# Patient Record
Sex: MALE | Race: WHITE | Employment: OTHER | URBAN - NONMETROPOLITAN AREA
[De-identification: names, ages, dates, MRNs, and addresses within clinical notes are randomized per-mention and may not be internally consistent; named-entity substitution may affect disease eponyms.]

---

## 2017-02-24 ENCOUNTER — APPOINTMENT (OUTPATIENT)
Dept: GENERAL RADIOLOGY | Age: 67
End: 2017-02-24
Payer: MEDICARE

## 2017-02-24 ENCOUNTER — HOSPITAL ENCOUNTER (EMERGENCY)
Age: 67
Discharge: HOME | End: 2017-02-24
Attending: EMERGENCY MEDICINE
Payer: MEDICARE

## 2017-02-24 VITALS
WEIGHT: 238 LBS | BODY MASS INDEX: 32.23 KG/M2 | HEIGHT: 72 IN | HEART RATE: 58 BPM | RESPIRATION RATE: 20 BRPM | SYSTOLIC BLOOD PRESSURE: 129 MMHG | TEMPERATURE: 97.9 F | DIASTOLIC BLOOD PRESSURE: 78 MMHG | OXYGEN SATURATION: 94 %

## 2017-02-24 DIAGNOSIS — R07.9 CHEST PAIN, UNSPECIFIED TYPE: Primary | ICD-10-CM

## 2017-02-24 LAB
ALBUMIN SERPL-MCNC: 4.5 G/DL (ref 3.5–5.2)
ALP BLD-CCNC: 61 U/L (ref 40–130)
ALT SERPL-CCNC: 35 U/L (ref 5–41)
ANION GAP SERPL CALCULATED.3IONS-SCNC: 14 MMOL/L (ref 7–19)
AST SERPL-CCNC: 24 U/L (ref 5–40)
BASOPHILS ABSOLUTE: 0.1 K/UL (ref 0–0.2)
BASOPHILS RELATIVE PERCENT: 0.9 % (ref 0–1)
BILIRUB SERPL-MCNC: 0.5 MG/DL (ref 0.2–1.2)
BUN BLDV-MCNC: 19 MG/DL (ref 8–23)
CALCIUM SERPL-MCNC: 10.3 MG/DL (ref 8.8–10.2)
CHLORIDE BLD-SCNC: 102 MMOL/L (ref 98–111)
CO2: 23 MMOL/L (ref 22–29)
CREAT SERPL-MCNC: 0.9 MG/DL (ref 0.5–1.2)
EOSINOPHILS ABSOLUTE: 0.2 K/UL (ref 0–0.6)
EOSINOPHILS RELATIVE PERCENT: 4 % (ref 0–5)
GFR NON-AFRICAN AMERICAN: >60
GLOBULIN: 3.2 G/DL
GLUCOSE BLD-MCNC: 93 MG/DL (ref 74–109)
HCT VFR BLD CALC: 43.5 % (ref 42–52)
HEMOGLOBIN: 15.3 G/DL (ref 14–18)
LYMPHOCYTES ABSOLUTE: 1.9 K/UL (ref 1.1–4.5)
LYMPHOCYTES RELATIVE PERCENT: 32 % (ref 20–40)
MCH RBC QN AUTO: 30.1 PG (ref 27–31)
MCHC RBC AUTO-ENTMCNC: 35.2 G/DL (ref 33–37)
MCV RBC AUTO: 85.6 FL (ref 80–94)
MONOCYTES ABSOLUTE: 0.6 K/UL (ref 0–0.9)
MONOCYTES RELATIVE PERCENT: 10 % (ref 0–10)
NEUTROPHILS ABSOLUTE: 3.1 K/UL (ref 1.5–7.5)
NEUTROPHILS RELATIVE PERCENT: 53.1 % (ref 50–65)
PDW BLD-RTO: 13.6 % (ref 11.5–14.5)
PERFORMED ON: NORMAL
PLATELET # BLD: 166 K/UL (ref 130–400)
PMV BLD AUTO: 12.9 FL (ref 7.4–10.4)
POC TROPONIN I: 0 NG/ML (ref 0–0.08)
POTASSIUM SERPL-SCNC: 4 MMOL/L (ref 3.5–5)
RBC # BLD: 5.08 M/UL (ref 4.7–6.1)
SODIUM BLD-SCNC: 139 MMOL/L (ref 136–145)
TOTAL PROTEIN: 7.7 G/DL (ref 6.6–8.7)
TROPONIN: <0.01 NG/ML (ref 0–0.03)
WBC # BLD: 5.8 K/UL (ref 4.8–10.8)

## 2017-02-24 PROCEDURE — 93350 STRESS TTE ONLY: CPT

## 2017-02-24 PROCEDURE — 84484 ASSAY OF TROPONIN QUANT: CPT

## 2017-02-24 PROCEDURE — 99282 EMERGENCY DEPT VISIT SF MDM: CPT | Performed by: EMERGENCY MEDICINE

## 2017-02-24 PROCEDURE — 71010 XR CHEST PORTABLE: CPT

## 2017-02-24 PROCEDURE — 80053 COMPREHEN METABOLIC PANEL: CPT

## 2017-02-24 PROCEDURE — 36415 COLL VENOUS BLD VENIPUNCTURE: CPT

## 2017-02-24 PROCEDURE — 93005 ELECTROCARDIOGRAM TRACING: CPT

## 2017-02-24 PROCEDURE — 85025 COMPLETE CBC W/AUTO DIFF WBC: CPT

## 2017-02-24 PROCEDURE — 99285 EMERGENCY DEPT VISIT HI MDM: CPT

## 2017-02-24 RX ORDER — SIMVASTATIN 40 MG
40 TABLET ORAL NIGHTLY
COMMUNITY
End: 2019-04-02

## 2017-02-24 ASSESSMENT — ENCOUNTER SYMPTOMS
ALLERGIC/IMMUNOLOGIC NEGATIVE: 1
ORTHOPNEA: 0
HEARTBURN: 0
COUGH: 0
SHORTNESS OF BREATH: 0
ABDOMINAL PAIN: 0
BACK PAIN: 0
VOMITING: 0
NAUSEA: 0
EYES NEGATIVE: 1
RESPIRATORY NEGATIVE: 1
TROUBLE SWALLOWING: 0
GASTROINTESTINAL NEGATIVE: 1

## 2017-02-24 ASSESSMENT — PAIN DESCRIPTION - LOCATION: LOCATION: CHEST

## 2017-02-24 ASSESSMENT — PAIN DESCRIPTION - DESCRIPTORS: DESCRIPTORS: SHARP

## 2017-02-24 ASSESSMENT — PAIN SCALES - GENERAL: PAINLEVEL_OUTOF10: 5

## 2017-02-28 LAB
EKG P AXIS: 55 DEGREES
EKG P-R INTERVAL: 170 MS
EKG Q-T INTERVAL: 404 MS
EKG QRS DURATION: 110 MS
EKG QTC CALCULATION (BAZETT): 412 MS
EKG T AXIS: 43 DEGREES

## 2018-09-05 LAB
BILIRUBIN URINE: NEGATIVE
BLOOD, URINE: NEGATIVE
CLARITY: CLEAR
COLOR: YELLOW
GLUCOSE URINE: NEGATIVE MG/DL
KETONES, URINE: NEGATIVE MG/DL
LEUKOCYTE ESTERASE, URINE: NEGATIVE
NITRITE, URINE: NEGATIVE
PH UA: 6
PROTEIN UA: NEGATIVE MG/DL
SPECIFIC GRAVITY UA: 1.02
UROBILINOGEN, URINE: 0.2 E.U./DL

## 2018-09-07 LAB — URINE CULTURE, ROUTINE: NORMAL

## 2018-12-11 ENCOUNTER — HOSPITAL ENCOUNTER (OUTPATIENT)
Dept: GENERAL RADIOLOGY | Age: 68
Discharge: HOME | End: 2018-12-11
Payer: MEDICARE

## 2018-12-11 DIAGNOSIS — M54.2 SPINE PAIN, CERVICAL: ICD-10-CM

## 2018-12-11 PROCEDURE — 72040 X-RAY EXAM NECK SPINE 2-3 VW: CPT

## 2018-12-26 ENCOUNTER — HOSPITAL ENCOUNTER (OUTPATIENT)
Dept: NON INVASIVE DIAGNOSTICS | Age: 68
Discharge: HOME | End: 2018-12-26
Payer: MEDICARE

## 2018-12-26 LAB
LV EF: 50 %
LVEF MODALITY: NORMAL

## 2018-12-26 PROCEDURE — 93226 XTRNL ECG REC<48 HR SCAN A/R: CPT

## 2018-12-26 PROCEDURE — 93225 XTRNL ECG REC<48 HRS REC: CPT

## 2018-12-26 PROCEDURE — 93306 TTE W/DOPPLER COMPLETE: CPT

## 2019-02-01 ENCOUNTER — HOSPITAL ENCOUNTER (OUTPATIENT)
Dept: NEUROLOGY | Age: 69
Discharge: HOME | End: 2019-02-01
Payer: MEDICARE

## 2019-02-01 PROCEDURE — 95911 NRV CNDJ TEST 9-10 STUDIES: CPT

## 2019-02-01 PROCEDURE — 95911 NRV CNDJ TEST 9-10 STUDIES: CPT | Performed by: PSYCHIATRY & NEUROLOGY

## 2019-02-01 PROCEDURE — 95886 MUSC TEST DONE W/N TEST COMP: CPT | Performed by: PSYCHIATRY & NEUROLOGY

## 2019-02-01 PROCEDURE — 95886 MUSC TEST DONE W/N TEST COMP: CPT

## 2019-03-01 ENCOUNTER — HOSPITAL ENCOUNTER (OUTPATIENT)
Dept: MRI IMAGING | Age: 69
Discharge: HOME | End: 2019-03-01
Payer: MEDICARE

## 2019-03-01 DIAGNOSIS — M51.35 DDD (DEGENERATIVE DISC DISEASE), THORACOLUMBAR: ICD-10-CM

## 2019-03-01 DIAGNOSIS — M54.2 NECK PAIN: ICD-10-CM

## 2019-03-01 PROCEDURE — 72141 MRI NECK SPINE W/O DYE: CPT

## 2019-04-02 ENCOUNTER — HOSPITAL ENCOUNTER (OUTPATIENT)
Dept: PREADMISSION TESTING | Age: 69
Discharge: HOME | End: 2019-04-06
Payer: MEDICARE

## 2019-04-02 ENCOUNTER — HOSPITAL ENCOUNTER (OUTPATIENT)
Dept: GENERAL RADIOLOGY | Age: 69
Discharge: HOME | End: 2019-04-02
Payer: MEDICARE

## 2019-04-02 VITALS — HEIGHT: 72 IN | BODY MASS INDEX: 32.64 KG/M2 | WEIGHT: 241 LBS

## 2019-04-02 LAB
ALBUMIN SERPL-MCNC: 4.3 G/DL (ref 3.5–5.2)
ALP BLD-CCNC: 50 U/L (ref 40–130)
ALT SERPL-CCNC: 52 U/L (ref 5–41)
ANION GAP SERPL CALCULATED.3IONS-SCNC: 14 MMOL/L (ref 7–19)
APTT: 31.9 SEC (ref 26–36.2)
AST SERPL-CCNC: 33 U/L (ref 5–40)
BACTERIA: NEGATIVE /HPF
BASOPHILS ABSOLUTE: 0.1 K/UL (ref 0–0.2)
BASOPHILS RELATIVE PERCENT: 1.3 % (ref 0–1)
BILIRUB SERPL-MCNC: 0.5 MG/DL (ref 0.2–1.2)
BILIRUBIN URINE: NEGATIVE
BLOOD, URINE: ABNORMAL
BUN BLDV-MCNC: 15 MG/DL (ref 8–23)
CALCIUM SERPL-MCNC: 9.9 MG/DL (ref 8.8–10.2)
CHLORIDE BLD-SCNC: 103 MMOL/L (ref 98–111)
CLARITY: CLEAR
CO2: 23 MMOL/L (ref 22–29)
COLOR: ABNORMAL
CREAT SERPL-MCNC: 0.7 MG/DL (ref 0.5–1.2)
EKG P AXIS: 55 DEGREES
EKG P-R INTERVAL: 168 MS
EKG Q-T INTERVAL: 370 MS
EKG QRS DURATION: 96 MS
EKG QTC CALCULATION (BAZETT): 396 MS
EKG T AXIS: 62 DEGREES
EOSINOPHILS ABSOLUTE: 0.3 K/UL (ref 0–0.6)
EOSINOPHILS RELATIVE PERCENT: 5.2 % (ref 0–5)
EPITHELIAL CELLS, UA: 1 /HPF (ref 0–5)
GFR NON-AFRICAN AMERICAN: >60
GLUCOSE BLD-MCNC: 116 MG/DL (ref 74–109)
GLUCOSE URINE: NEGATIVE MG/DL
HCT VFR BLD CALC: 46 % (ref 42–52)
HEMOGLOBIN: 15.8 G/DL (ref 14–18)
HYALINE CASTS: 1 /HPF (ref 0–8)
INR BLD: 1.08 (ref 0.88–1.18)
KETONES, URINE: NEGATIVE MG/DL
LEUKOCYTE ESTERASE, URINE: ABNORMAL
LYMPHOCYTES ABSOLUTE: 1.5 K/UL (ref 1.1–4.5)
LYMPHOCYTES RELATIVE PERCENT: 28 % (ref 20–40)
MCH RBC QN AUTO: 30.2 PG (ref 27–31)
MCHC RBC AUTO-ENTMCNC: 34.3 G/DL (ref 33–37)
MCV RBC AUTO: 88 FL (ref 80–94)
MONOCYTES ABSOLUTE: 0.7 K/UL (ref 0–0.9)
MONOCYTES RELATIVE PERCENT: 13.5 % (ref 0–10)
NEUTROPHILS ABSOLUTE: 2.8 K/UL (ref 1.5–7.5)
NEUTROPHILS RELATIVE PERCENT: 51.6 % (ref 50–65)
NITRITE, URINE: NEGATIVE
PDW BLD-RTO: 13.1 % (ref 11.5–14.5)
PH UA: 6.5 (ref 5–8)
PLATELET # BLD: 174 K/UL (ref 130–400)
PMV BLD AUTO: 13 FL (ref 9.4–12.4)
POTASSIUM SERPL-SCNC: 4 MMOL/L (ref 3.5–5)
PROTEIN UA: 30 MG/DL
PROTHROMBIN TIME: 13.4 SEC (ref 12–14.6)
RBC # BLD: 5.23 M/UL (ref 4.7–6.1)
RBC UA: 1 /HPF (ref 0–4)
SODIUM BLD-SCNC: 140 MMOL/L (ref 136–145)
SPECIFIC GRAVITY UA: 1.02 (ref 1–1.03)
TOTAL PROTEIN: 7.9 G/DL (ref 6.6–8.7)
URINE REFLEX TO CULTURE: YES
UROBILINOGEN, URINE: 0.2 E.U./DL
WBC # BLD: 5.4 K/UL (ref 4.8–10.8)
WBC UA: 2 /HPF (ref 0–5)

## 2019-04-02 PROCEDURE — 87086 URINE CULTURE/COLONY COUNT: CPT

## 2019-04-02 PROCEDURE — 85025 COMPLETE CBC W/AUTO DIFF WBC: CPT

## 2019-04-02 PROCEDURE — 93005 ELECTROCARDIOGRAM TRACING: CPT

## 2019-04-02 PROCEDURE — 80053 COMPREHEN METABOLIC PANEL: CPT

## 2019-04-02 PROCEDURE — 85730 THROMBOPLASTIN TIME PARTIAL: CPT

## 2019-04-02 PROCEDURE — 81001 URINALYSIS AUTO W/SCOPE: CPT

## 2019-04-02 PROCEDURE — 85610 PROTHROMBIN TIME: CPT

## 2019-04-02 PROCEDURE — 71046 X-RAY EXAM CHEST 2 VIEWS: CPT

## 2019-04-02 RX ORDER — ATORVASTATIN CALCIUM 40 MG/1
40 TABLET, FILM COATED ORAL DAILY
Status: ON HOLD | COMMUNITY
End: 2020-06-02 | Stop reason: HOSPADM

## 2019-04-02 RX ORDER — LOSARTAN POTASSIUM AND HYDROCHLOROTHIAZIDE 25; 100 MG/1; MG/1
1 TABLET ORAL DAILY
COMMUNITY

## 2019-04-02 RX ORDER — MELOXICAM 15 MG/1
15 TABLET ORAL DAILY
Status: ON HOLD | COMMUNITY
End: 2019-04-12 | Stop reason: HOSPADM

## 2019-04-04 LAB
ORGANISM: ABNORMAL
URINE CULTURE, ROUTINE: ABNORMAL
URINE CULTURE, ROUTINE: ABNORMAL

## 2019-04-11 ENCOUNTER — HOSPITAL ENCOUNTER (INPATIENT)
Age: 69
LOS: 1 days | Discharge: HOME | DRG: 472 | End: 2019-04-12
Payer: MEDICARE

## 2019-04-11 ENCOUNTER — ANESTHESIA EVENT (OUTPATIENT)
Dept: OPERATING ROOM | Age: 69
DRG: 472 | End: 2019-04-11
Payer: MEDICARE

## 2019-04-11 ENCOUNTER — ANESTHESIA (OUTPATIENT)
Dept: OPERATING ROOM | Age: 69
DRG: 472 | End: 2019-04-11
Payer: MEDICARE

## 2019-04-11 ENCOUNTER — APPOINTMENT (OUTPATIENT)
Dept: GENERAL RADIOLOGY | Age: 69
DRG: 472 | End: 2019-04-11
Payer: MEDICARE

## 2019-04-11 VITALS
TEMPERATURE: 96.7 F | SYSTOLIC BLOOD PRESSURE: 139 MMHG | DIASTOLIC BLOOD PRESSURE: 67 MMHG | OXYGEN SATURATION: 90 % | RESPIRATION RATE: 1 BRPM

## 2019-04-11 DIAGNOSIS — G95.9 CERVICAL MYELOPATHY (HCC): Primary | ICD-10-CM

## 2019-04-11 PROBLEM — M50.321 DEGENERATION OF INTERVERTEBRAL DISC AT C4-C5 LEVEL: Status: ACTIVE | Noted: 2019-04-11

## 2019-04-11 LAB
ABO/RH: NORMAL
ANTIBODY SCREEN: NORMAL
GLUCOSE BLD-MCNC: 155 MG/DL (ref 70–99)
PERFORMED ON: ABNORMAL

## 2019-04-11 PROCEDURE — 72040 X-RAY EXAM NECK SPINE 2-3 VW: CPT

## 2019-04-11 PROCEDURE — 82948 REAGENT STRIP/BLOOD GLUCOSE: CPT

## 2019-04-11 PROCEDURE — 3600000005 HC SURGERY LEVEL 5 BASE

## 2019-04-11 PROCEDURE — 6370000000 HC RX 637 (ALT 250 FOR IP)

## 2019-04-11 PROCEDURE — 2500000003 HC RX 250 WO HCPCS: Performed by: ANESTHESIOLOGY

## 2019-04-11 PROCEDURE — 6360000002 HC RX W HCPCS

## 2019-04-11 PROCEDURE — 6360000002 HC RX W HCPCS: Performed by: ANESTHESIOLOGY

## 2019-04-11 PROCEDURE — 2709999900 HC NON-CHARGEABLE SUPPLY

## 2019-04-11 PROCEDURE — 3600000015 HC SURGERY LEVEL 5 ADDTL 15MIN

## 2019-04-11 PROCEDURE — 36415 COLL VENOUS BLD VENIPUNCTURE: CPT

## 2019-04-11 PROCEDURE — 2780000010 HC IMPLANT OTHER

## 2019-04-11 PROCEDURE — 0RB30ZZ EXCISION OF CERVICAL VERTEBRAL DISC, OPEN APPROACH: ICD-10-PCS

## 2019-04-11 PROCEDURE — 6360000002 HC RX W HCPCS: Performed by: NURSE ANESTHETIST, CERTIFIED REGISTERED

## 2019-04-11 PROCEDURE — 94664 DEMO&/EVAL PT USE INHALER: CPT

## 2019-04-11 PROCEDURE — 3700000001 HC ADD 15 MINUTES (ANESTHESIA)

## 2019-04-11 PROCEDURE — 94762 N-INVAS EAR/PLS OXIMTRY CONT: CPT

## 2019-04-11 PROCEDURE — C1713 ANCHOR/SCREW BN/BN,TIS/BN: HCPCS

## 2019-04-11 PROCEDURE — C1762 CONN TISS, HUMAN(INC FASCIA): HCPCS

## 2019-04-11 PROCEDURE — 86900 BLOOD TYPING SEROLOGIC ABO: CPT

## 2019-04-11 PROCEDURE — 86850 RBC ANTIBODY SCREEN: CPT

## 2019-04-11 PROCEDURE — 2700000000 HC OXYGEN THERAPY PER DAY

## 2019-04-11 PROCEDURE — 7100000000 HC PACU RECOVERY - FIRST 15 MIN

## 2019-04-11 PROCEDURE — 4A11X4G MONITORING OF PERIPHERAL NERVOUS ELECTRICAL ACTIVITY, INTRAOPERATIVE, EXTERNAL APPROACH: ICD-10-PCS

## 2019-04-11 PROCEDURE — 0RG20K0 FUSION OF 2 OR MORE CERVICAL VERTEBRAL JOINTS WITH NONAUTOLOGOUS TISSUE SUBSTITUTE, ANTERIOR APPROACH, ANTERIOR COLUMN, OPEN APPROACH: ICD-10-PCS

## 2019-04-11 PROCEDURE — 3209999900 FLUORO FOR SURGICAL PROCEDURES

## 2019-04-11 PROCEDURE — 2580000003 HC RX 258

## 2019-04-11 PROCEDURE — 7100000001 HC PACU RECOVERY - ADDTL 15 MIN

## 2019-04-11 PROCEDURE — 3700000000 HC ANESTHESIA ATTENDED CARE

## 2019-04-11 PROCEDURE — 86901 BLOOD TYPING SEROLOGIC RH(D): CPT

## 2019-04-11 PROCEDURE — 2500000003 HC RX 250 WO HCPCS: Performed by: NURSE ANESTHETIST, CERTIFIED REGISTERED

## 2019-04-11 PROCEDURE — L0120 CERV FLEX N/ADJ FOAM PRE OTS: HCPCS

## 2019-04-11 PROCEDURE — 1210000000 HC MED SURG R&B

## 2019-04-11 PROCEDURE — 2500000003 HC RX 250 WO HCPCS

## 2019-04-11 PROCEDURE — 2720000010 HC SURG SUPPLY STERILE

## 2019-04-11 DEVICE — 4.0MM VARIABLE ANGLE, SELF-DRILLING HEXALOBE SCREW, 16MM
Type: IMPLANTABLE DEVICE | Site: SPINE CERVICAL | Status: FUNCTIONAL
Brand: TRESTLE LUXE

## 2019-04-11 DEVICE — GRAFT BNE SUB 5CC VIABLE MTRX VIBNE: Type: IMPLANTABLE DEVICE | Site: SPINE CERVICAL | Status: FUNCTIONAL

## 2019-04-11 DEVICE — CAGE SPNL W17XH8MM D14.5MM 6DEG ANT CERV INTBDY FUS LORD W/: Type: IMPLANTABLE DEVICE | Site: SPINE CERVICAL | Status: FUNCTIONAL

## 2019-04-11 DEVICE — ANTERIOR CERVICAL PLATE ASSEMBLY, 2-LEVEL, 032MM
Type: IMPLANTABLE DEVICE | Site: SPINE CERVICAL | Status: FUNCTIONAL
Brand: TRESTLE LUXE

## 2019-04-11 DEVICE — AGENT HEMSTAT 8ML FLX TIP MTRX + DISP SURGIFLO: Type: IMPLANTABLE DEVICE | Site: SPINE CERVICAL | Status: FUNCTIONAL

## 2019-04-11 DEVICE — CAGE SPNL W17XH7MM D14.5MM 6DEG ANT CERV INTBDY FUS LORD W/: Type: IMPLANTABLE DEVICE | Site: SPINE CERVICAL | Status: FUNCTIONAL

## 2019-04-11 RX ORDER — SODIUM CHLORIDE 9 MG/ML
INJECTION, SOLUTION INTRAVENOUS CONTINUOUS
Status: DISCONTINUED | OUTPATIENT
Start: 2019-04-11 | End: 2019-04-12 | Stop reason: HOSPADM

## 2019-04-11 RX ORDER — LIDOCAINE HYDROCHLORIDE 10 MG/ML
INJECTION, SOLUTION INFILTRATION; PERINEURAL PRN
Status: DISCONTINUED | OUTPATIENT
Start: 2019-04-11 | End: 2019-04-11 | Stop reason: SDUPTHER

## 2019-04-11 RX ORDER — KETAMINE HYDROCHLORIDE 100 MG/ML
INJECTION, SOLUTION INTRAMUSCULAR; INTRAVENOUS PRN
Status: DISCONTINUED | OUTPATIENT
Start: 2019-04-11 | End: 2019-04-11 | Stop reason: SDUPTHER

## 2019-04-11 RX ORDER — LABETALOL HYDROCHLORIDE 5 MG/ML
5 INJECTION, SOLUTION INTRAVENOUS EVERY 10 MIN PRN
Status: DISCONTINUED | OUTPATIENT
Start: 2019-04-11 | End: 2019-04-11 | Stop reason: HOSPADM

## 2019-04-11 RX ORDER — MORPHINE SULFATE 4 MG/ML
4 INJECTION, SOLUTION INTRAMUSCULAR; INTRAVENOUS
Status: DISCONTINUED | OUTPATIENT
Start: 2019-04-11 | End: 2019-04-11 | Stop reason: HOSPADM

## 2019-04-11 RX ORDER — LOSARTAN POTASSIUM 100 MG/1
100 TABLET ORAL DAILY
Status: DISCONTINUED | OUTPATIENT
Start: 2019-04-11 | End: 2019-04-12 | Stop reason: HOSPADM

## 2019-04-11 RX ORDER — FENTANYL CITRATE 50 UG/ML
50 INJECTION, SOLUTION INTRAMUSCULAR; INTRAVENOUS
Status: DISCONTINUED | OUTPATIENT
Start: 2019-04-11 | End: 2019-04-11 | Stop reason: HOSPADM

## 2019-04-11 RX ORDER — CEFAZOLIN SODIUM 1 G/50ML
1 INJECTION, SOLUTION INTRAVENOUS EVERY 8 HOURS
Status: COMPLETED | OUTPATIENT
Start: 2019-04-11 | End: 2019-04-12

## 2019-04-11 RX ORDER — OXYCODONE HYDROCHLORIDE AND ACETAMINOPHEN 5; 325 MG/1; MG/1
2 TABLET ORAL EVERY 4 HOURS PRN
Status: DISCONTINUED | OUTPATIENT
Start: 2019-04-11 | End: 2019-04-12 | Stop reason: HOSPADM

## 2019-04-11 RX ORDER — PROPOFOL 10 MG/ML
INJECTION, EMULSION INTRAVENOUS CONTINUOUS PRN
Status: DISCONTINUED | OUTPATIENT
Start: 2019-04-11 | End: 2019-04-11 | Stop reason: SDUPTHER

## 2019-04-11 RX ORDER — OXYCODONE HYDROCHLORIDE AND ACETAMINOPHEN 5; 325 MG/1; MG/1
1 TABLET ORAL EVERY 4 HOURS PRN
Status: DISCONTINUED | OUTPATIENT
Start: 2019-04-11 | End: 2019-04-12 | Stop reason: HOSPADM

## 2019-04-11 RX ORDER — DIPHENHYDRAMINE HYDROCHLORIDE 50 MG/ML
12.5 INJECTION INTRAMUSCULAR; INTRAVENOUS
Status: DISCONTINUED | OUTPATIENT
Start: 2019-04-11 | End: 2019-04-11 | Stop reason: HOSPADM

## 2019-04-11 RX ORDER — HYDROCHLOROTHIAZIDE 25 MG/1
25 TABLET ORAL DAILY
Status: DISCONTINUED | OUTPATIENT
Start: 2019-04-11 | End: 2019-04-12 | Stop reason: HOSPADM

## 2019-04-11 RX ORDER — PROMETHAZINE HYDROCHLORIDE 25 MG/ML
6.25 INJECTION, SOLUTION INTRAMUSCULAR; INTRAVENOUS
Status: DISCONTINUED | OUTPATIENT
Start: 2019-04-11 | End: 2019-04-11 | Stop reason: HOSPADM

## 2019-04-11 RX ORDER — LOSARTAN POTASSIUM AND HYDROCHLOROTHIAZIDE 25; 100 MG/1; MG/1
1 TABLET ORAL DAILY
Status: DISCONTINUED | OUTPATIENT
Start: 2019-04-11 | End: 2019-04-11 | Stop reason: CLARIF

## 2019-04-11 RX ORDER — PROPOFOL 10 MG/ML
INJECTION, EMULSION INTRAVENOUS PRN
Status: DISCONTINUED | OUTPATIENT
Start: 2019-04-11 | End: 2019-04-11 | Stop reason: SDUPTHER

## 2019-04-11 RX ORDER — DOCUSATE SODIUM 100 MG/1
100 CAPSULE, LIQUID FILLED ORAL 2 TIMES DAILY
Status: DISCONTINUED | OUTPATIENT
Start: 2019-04-11 | End: 2019-04-12 | Stop reason: HOSPADM

## 2019-04-11 RX ORDER — LIDOCAINE HYDROCHLORIDE 10 MG/ML
1 INJECTION, SOLUTION EPIDURAL; INFILTRATION; INTRACAUDAL; PERINEURAL
Status: DISCONTINUED | OUTPATIENT
Start: 2019-04-11 | End: 2019-04-11 | Stop reason: HOSPADM

## 2019-04-11 RX ORDER — SODIUM CHLORIDE 0.9 % (FLUSH) 0.9 %
10 SYRINGE (ML) INJECTION PRN
Status: DISCONTINUED | OUTPATIENT
Start: 2019-04-11 | End: 2019-04-12 | Stop reason: HOSPADM

## 2019-04-11 RX ORDER — SUCCINYLCHOLINE CHLORIDE 20 MG/ML
INJECTION INTRAMUSCULAR; INTRAVENOUS PRN
Status: DISCONTINUED | OUTPATIENT
Start: 2019-04-11 | End: 2019-04-11 | Stop reason: SDUPTHER

## 2019-04-11 RX ORDER — SODIUM CHLORIDE 0.9 % (FLUSH) 0.9 %
10 SYRINGE (ML) INJECTION PRN
Status: DISCONTINUED | OUTPATIENT
Start: 2019-04-11 | End: 2019-04-11 | Stop reason: HOSPADM

## 2019-04-11 RX ORDER — ONDANSETRON 2 MG/ML
4 INJECTION INTRAMUSCULAR; INTRAVENOUS EVERY 6 HOURS PRN
Status: DISCONTINUED | OUTPATIENT
Start: 2019-04-11 | End: 2019-04-12 | Stop reason: HOSPADM

## 2019-04-11 RX ORDER — SUFENTANIL CITRATE 50 UG/ML
INJECTION EPIDURAL; INTRAVENOUS PRN
Status: DISCONTINUED | OUTPATIENT
Start: 2019-04-11 | End: 2019-04-11 | Stop reason: SDUPTHER

## 2019-04-11 RX ORDER — MORPHINE SULFATE 2 MG/ML
2 INJECTION, SOLUTION INTRAMUSCULAR; INTRAVENOUS EVERY 5 MIN PRN
Status: DISCONTINUED | OUTPATIENT
Start: 2019-04-11 | End: 2019-04-11 | Stop reason: HOSPADM

## 2019-04-11 RX ORDER — SODIUM CHLORIDE 0.9 % (FLUSH) 0.9 %
10 SYRINGE (ML) INJECTION EVERY 12 HOURS SCHEDULED
Status: DISCONTINUED | OUTPATIENT
Start: 2019-04-11 | End: 2019-04-11 | Stop reason: HOSPADM

## 2019-04-11 RX ORDER — MIDAZOLAM HYDROCHLORIDE 1 MG/ML
2 INJECTION INTRAMUSCULAR; INTRAVENOUS
Status: DISCONTINUED | OUTPATIENT
Start: 2019-04-11 | End: 2019-04-11 | Stop reason: HOSPADM

## 2019-04-11 RX ORDER — DEXAMETHASONE SODIUM PHOSPHATE 10 MG/ML
INJECTION INTRAMUSCULAR; INTRAVENOUS PRN
Status: DISCONTINUED | OUTPATIENT
Start: 2019-04-11 | End: 2019-04-11 | Stop reason: SDUPTHER

## 2019-04-11 RX ORDER — ONDANSETRON 2 MG/ML
INJECTION INTRAMUSCULAR; INTRAVENOUS PRN
Status: DISCONTINUED | OUTPATIENT
Start: 2019-04-11 | End: 2019-04-11 | Stop reason: SDUPTHER

## 2019-04-11 RX ORDER — ATORVASTATIN CALCIUM 40 MG/1
40 TABLET, FILM COATED ORAL NIGHTLY
Status: DISCONTINUED | OUTPATIENT
Start: 2019-04-11 | End: 2019-04-12 | Stop reason: HOSPADM

## 2019-04-11 RX ORDER — SCOLOPAMINE TRANSDERMAL SYSTEM 1 MG/1
1 PATCH, EXTENDED RELEASE TRANSDERMAL ONCE
Status: DISCONTINUED | OUTPATIENT
Start: 2019-04-11 | End: 2019-04-11 | Stop reason: HOSPADM

## 2019-04-11 RX ORDER — SODIUM CHLORIDE 0.9 % (FLUSH) 0.9 %
10 SYRINGE (ML) INJECTION EVERY 12 HOURS SCHEDULED
Status: DISCONTINUED | OUTPATIENT
Start: 2019-04-11 | End: 2019-04-12 | Stop reason: HOSPADM

## 2019-04-11 RX ORDER — METOCLOPRAMIDE HYDROCHLORIDE 5 MG/ML
10 INJECTION INTRAMUSCULAR; INTRAVENOUS
Status: DISCONTINUED | OUTPATIENT
Start: 2019-04-11 | End: 2019-04-11 | Stop reason: HOSPADM

## 2019-04-11 RX ORDER — MEPERIDINE HYDROCHLORIDE 50 MG/ML
12.5 INJECTION INTRAMUSCULAR; INTRAVENOUS; SUBCUTANEOUS EVERY 5 MIN PRN
Status: DISCONTINUED | OUTPATIENT
Start: 2019-04-11 | End: 2019-04-11 | Stop reason: HOSPADM

## 2019-04-11 RX ORDER — MORPHINE SULFATE 2 MG/ML
4 INJECTION, SOLUTION INTRAMUSCULAR; INTRAVENOUS EVERY 5 MIN PRN
Status: DISCONTINUED | OUTPATIENT
Start: 2019-04-11 | End: 2019-04-11 | Stop reason: HOSPADM

## 2019-04-11 RX ORDER — SODIUM CHLORIDE, SODIUM LACTATE, POTASSIUM CHLORIDE, CALCIUM CHLORIDE 600; 310; 30; 20 MG/100ML; MG/100ML; MG/100ML; MG/100ML
INJECTION, SOLUTION INTRAVENOUS CONTINUOUS
Status: DISCONTINUED | OUTPATIENT
Start: 2019-04-11 | End: 2019-04-11

## 2019-04-11 RX ORDER — HYDRALAZINE HYDROCHLORIDE 20 MG/ML
5 INJECTION INTRAMUSCULAR; INTRAVENOUS EVERY 10 MIN PRN
Status: DISCONTINUED | OUTPATIENT
Start: 2019-04-11 | End: 2019-04-11 | Stop reason: HOSPADM

## 2019-04-11 RX ADMIN — ATORVASTATIN CALCIUM 40 MG: 40 TABLET, FILM COATED ORAL at 20:45

## 2019-04-11 RX ADMIN — ONDANSETRON HYDROCHLORIDE 4 MG: 2 INJECTION, SOLUTION INTRAMUSCULAR; INTRAVENOUS at 13:13

## 2019-04-11 RX ADMIN — MEPERIDINE HYDROCHLORIDE 12.5 MG: 50 INJECTION, SOLUTION INTRAMUSCULAR; INTRAVENOUS; SUBCUTANEOUS at 14:25

## 2019-04-11 RX ADMIN — SODIUM CHLORIDE, SODIUM LACTATE, POTASSIUM CHLORIDE, AND CALCIUM CHLORIDE: 600; 310; 30; 20 INJECTION, SOLUTION INTRAVENOUS at 11:34

## 2019-04-11 RX ADMIN — PROPOFOL 160 MCG/KG/MIN: 10 INJECTION, EMULSION INTRAVENOUS at 11:46

## 2019-04-11 RX ADMIN — PROPOFOL 120 MG: 10 INJECTION, EMULSION INTRAVENOUS at 11:45

## 2019-04-11 RX ADMIN — SODIUM CHLORIDE, SODIUM LACTATE, POTASSIUM CHLORIDE, AND CALCIUM CHLORIDE: 600; 310; 30; 20 INJECTION, SOLUTION INTRAVENOUS at 13:19

## 2019-04-11 RX ADMIN — Medication 50 MG: at 11:59

## 2019-04-11 RX ADMIN — SODIUM CHLORIDE: 9 INJECTION, SOLUTION INTRAVENOUS at 15:34

## 2019-04-11 RX ADMIN — Medication 10 ML: at 20:46

## 2019-04-11 RX ADMIN — Medication 2 G: at 11:53

## 2019-04-11 RX ADMIN — DOCUSATE SODIUM 100 MG: 100 CAPSULE, LIQUID FILLED ORAL at 20:45

## 2019-04-11 RX ADMIN — CEFAZOLIN SODIUM 1 G: 1 INJECTION, SOLUTION INTRAVENOUS at 20:43

## 2019-04-11 RX ADMIN — OXYCODONE HYDROCHLORIDE AND ACETAMINOPHEN 2 TABLET: 5; 325 TABLET ORAL at 16:28

## 2019-04-11 RX ADMIN — HYDROMORPHONE HYDROCHLORIDE 0.5 MG: 1 INJECTION, SOLUTION INTRAMUSCULAR; INTRAVENOUS; SUBCUTANEOUS at 13:32

## 2019-04-11 RX ADMIN — DEXAMETHASONE SODIUM PHOSPHATE 10 MG: 10 INJECTION INTRAMUSCULAR; INTRAVENOUS at 11:59

## 2019-04-11 RX ADMIN — HYDROMORPHONE HYDROCHLORIDE 0.5 MG: 1 INJECTION, SOLUTION INTRAMUSCULAR; INTRAVENOUS; SUBCUTANEOUS at 13:42

## 2019-04-11 RX ADMIN — SUFENTANIL CITRATE 50 MCG: 50 INJECTION, SOLUTION EPIDURAL; INTRAVENOUS at 11:45

## 2019-04-11 RX ADMIN — LIDOCAINE HYDROCHLORIDE 50 MG: 10 INJECTION, SOLUTION INFILTRATION; PERINEURAL at 11:45

## 2019-04-11 RX ADMIN — SUCCINYLCHOLINE CHLORIDE 120 MG: 20 INJECTION, SOLUTION INTRAMUSCULAR; INTRAVENOUS; PARENTERAL at 11:45

## 2019-04-11 RX ADMIN — OXYCODONE HYDROCHLORIDE AND ACETAMINOPHEN 2 TABLET: 5; 325 TABLET ORAL at 20:48

## 2019-04-11 RX ADMIN — LABETALOL 20 MG/4 ML (5 MG/ML) INTRAVENOUS SYRINGE 5 MG: at 14:06

## 2019-04-11 RX ADMIN — HYDROMORPHONE HYDROCHLORIDE 0.5 MG: 1 INJECTION, SOLUTION INTRAMUSCULAR; INTRAVENOUS; SUBCUTANEOUS at 15:33

## 2019-04-11 ASSESSMENT — PAIN SCALES - GENERAL
PAINLEVEL_OUTOF10: 0
PAINLEVEL_OUTOF10: 0
PAINLEVEL_OUTOF10: 9
PAINLEVEL_OUTOF10: 0
PAINLEVEL_OUTOF10: 3
PAINLEVEL_OUTOF10: 0
PAINLEVEL_OUTOF10: 7
PAINLEVEL_OUTOF10: 0
PAINLEVEL_OUTOF10: 0
PAINLEVEL_OUTOF10: 7
PAINLEVEL_OUTOF10: 0
PAINLEVEL_OUTOF10: 0

## 2019-04-11 ASSESSMENT — ENCOUNTER SYMPTOMS: SHORTNESS OF BREATH: 0

## 2019-04-11 ASSESSMENT — LIFESTYLE VARIABLES: SMOKING_STATUS: 0

## 2019-04-11 NOTE — BRIEF OP NOTE
Brief Postoperative Note  ______________________________________________________________    Patient: Mike Diamond  YOB: 1950  MRN: [id number]  Date of Procedure: 4/11/2019    Pre-Op Diagnosis: g95.9    Post-Op Diagnosis: Same       Procedure(s):  C3-4 C4-5 ACDF    Anesthesia: General    Surgeon(s):  Nano Dawkins MD    Assistant: Rylie Thrasher    Estimated Blood Loss (mL): less than 50     Complications: None    Specimens:   * No specimens in log *    Implants:  Implant Name Type Inv. Item Serial No.  Lot No. LRB No. Used   IMPL SEALANT SURGIFLO HEMOSTAT MATRIC Bone/Graft/Tissue/Human/Synth IMPL SEALANT SURGIFLO HEMOSTAT MATRIC  [place]: [employer] [id number] N/A 1   ALLOGRAFT BONE MATRIX VIBONE 5CC Spine ALLOGRAFT BONE MATRIX VIBONE 5CC  [employer] [id number] N/A 1   IMPL SPINE SYS FIX CERVICAL [id number] 6D Spine IMPL SPINE SYS FIX CERVICAL [id number] 6D  [employer] [id number] N/A 1   IMPL SPINE SYS FIX CERVICAL [id number] Spine IMPL SPINE SYS FIX CERVICAL [id number]  [employer] [id number] N/A 1   PLATE TRESTLE 2 LVL 78KN Spine PLATE TRESTLE 2 LVL 12CJ  [employer]  N/A 1   SCREW 4MM YASMIN ANGLE SELF DRILLING 16MM Spine SCREW 4MM YASMIN ANGLE SELF DRILLING 16MM  ALPHAT[employer] SPINE  N/A 6         Drains:   Closed/Suction Drain Anterior Neck Bulb (Active)   Site Description Unable to view 4/11/2019  1:58 PM   Dressing Status Dry;Clean; Intact 4/11/2019  1:58 PM   Drainage Appearance Bright red 4/11/2019  1:58 PM   Status Compressed 4/11/2019  1:58 PM       Findings: SEE OPERATIVE REPORT    Nano Dawkins MD  Date: 4/11/2019  Time: 1:59 PM

## 2019-04-11 NOTE — PROGRESS NOTES
Physical Therapy    Attempted Evaluation. Pt states too groggy and pain at moment to participate. Will f/u at later time.     Electronically signed by Hailee Freeman on 4/11/2019 at 4:09 PM

## 2019-04-11 NOTE — ANESTHESIA PRE PROCEDURE
Department of Anesthesiology  Preprocedure Note       Name:  Reyes Trujillo   Age:  76 y.o.  :  1950                                          MRN:  [id number]         Date:  2019      Surgeon: Umair Kaplan):  Magui Lin MD    Procedure: C3-4 C4-5 ACDF (N/A )    Medications prior to admission:   Prior to Admission medications    Medication Sig Start Date End Date Taking? Authorizing Provider   metFORMIN (GLUCOPHAGE) 500 MG tablet Take 500 mg by mouth daily (with breakfast)   Yes Historical Provider, MD   atorvastatin (LIPITOR) 40 MG tablet Take 40 mg by mouth daily   Yes Historical Provider, MD   losartan-hydrochlorothiazide (HYZAAR) 100-25 MG per tablet Take 1 tablet by mouth daily   Yes Historical Provider, MD   meloxicam (MOBIC) 15 MG tablet Take 15 mg by mouth daily    Historical Provider, MD       Current medications:    Current Facility-Administered Medications   Medication Dose Route Frequency Provider Last Rate Last Dose    ceFAZolin (ANCEF) 2 g in 0.9% sodium chloride 50 mL IVPB  2 g Intravenous Once Magui Lin MD        lactated ringers infusion   Intravenous Continuous Magui Lin MD           Allergies:  No Known Allergies    Problem List:  There is no problem list on file for this patient. Past Medical History:        Diagnosis Date    Diabetes mellitus ([facility] [address]     Hyperlipidemia     Hypertension        Past Surgical History:        Procedure Laterality Date    BACK SURGERY      CARDIAC SURGERY  1999    CABG X 4V    CHOLECYSTECTOMY      KNEE SURGERY Right     scope, age 35ish        Social History:    Social History     Tobacco Use    Smoking status: Former Smoker     Types: Cigarettes     Last attempt to quit: 1995     Years since quittin.0    Smokeless tobacco: Never Used   Substance Use Topics    Alcohol use:  No                                Counseling given: Not Answered      Vital Signs (Current):   Vitals:    19 0840   BP: 137/71   Pulse: 73   Resp: 18   Temp: 97.5 °F (36.4 °C)   TempSrc: Temporal   SpO2: 95%   Weight: 250 lb (113.4 kg)   Height: 6' (1.829 m)                                              BP Readings from Last 3 Encounters:   04/11/19 137/71   02/24/17 129/78       NPO Status: Time of last liquid consumption: 0000                        Time of last solid consumption: 0000                        Date of last liquid consumption: 04/10/19                        Date of last solid food consumption: 04/10/19    BMI:   Wt Readings from Last 3 Encounters:   04/11/19 250 lb (113.4 kg)   04/02/19 241 lb (109.3 kg)   02/24/17 238 lb (108 kg)     Body mass index is 33.91 kg/m².     CBC:   Lab Results   Component Value Date    WBC 5.4 04/02/2019    RBC 5.23 04/02/2019    HGB 15.8 04/02/2019    HCT 46.0 04/02/2019    MCV 88.0 04/02/2019    RDW 13.1 04/02/2019     04/02/2019       CMP:   Lab Results   Component Value Date     04/02/2019    K 4.0 04/02/2019     04/02/2019    CO2 23 04/02/2019    BUN 15 04/02/2019    CREATININE 0.7 04/02/2019    LABGLOM >60 04/02/2019    GLUCOSE 116 04/02/2019    PROT 7.9 04/02/2019    CALCIUM 9.9 04/02/2019    BILITOT 0.5 04/02/2019    ALKPHOS 50 04/02/2019    AST 33 04/02/2019    ALT 52 04/02/2019       POC Tests:   Recent Labs     04/11/19  0857   POCGLU 155*       Coags:   Lab Results   Component Value Date    PROTIME 13.4 04/02/2019    INR 1.08 04/02/2019    APTT 31.9 04/02/2019       HCG (If Applicable): No results found for: PREGTESTUR, PREGSERUM, HCG, HCGQUANT     ABGs: No results found for: PHART, PO2ART, GAZ1ZZN, CPL4AJA, BEART, H7OPUVSG     Type & Screen (If Applicable):  No results found for: LABABO, [address]    Anesthesia Evaluation  Patient summary reviewed and Nursing notes reviewed no history of anesthetic complications:   Airway: Mallampati: II  TM distance: >3 FB   Neck ROM: full  Mouth opening: > = 3 FB Dental: normal exam   (+) upper dentures and lower dentures      Pulmonary:Negative Pulmonary ROS and normal exam  breath sounds clear to auscultation      (-) shortness of breath and not a current smoker          Patient did not smoke on day of surgery. Cardiovascular:    (+) hypertension:, CAD:, CABG/stent (3v cabg 1999):, hyperlipidemia    (-)  angina and  CHF    NYHA Classification: I  ECG reviewed  Rhythm: regular  Rate: normal  Echocardiogram reviewed         Beta Blocker:  Not on Beta Blocker      ROS comment:    Conclusions      Summary   Normal left ventricular size with preserved LV function and an estimated   ejection fraction of approximately 50%.  E/A flow reversal diastolic filling pattern consistent with Grade I   diastolic dysfunction.   Mild concentric left ventricular hypertrophy.   No evidence of left ventricle pseudoaneurysm.        Neuro/Psych:   Negative Neuro/Psych ROS     (-) seizures, CVA and depression/anxiety            GI/Hepatic/Renal: Neg GI/Hepatic/Renal ROS  (+) liver disease:,      (-) hiatal hernia and GERD      ROS comment: Elevated lfts . Endo/Other: Negative Endo/Other ROS   (+) DiabetesType II DM, , .          Pt had PAT visit. Abdominal:   (+) obese,     Abdomen: soft. Vascular:                                      Anesthesia Plan      general     ASA 3     (Iv zofran within 30 min of closing )  Induction: intravenous. BIS  MIPS: Postoperative opioids intended and Prophylactic antiemetics administered. Anesthetic plan and risks discussed with patient. Use of blood products discussed with patient whom. Plan discussed with CRNA.     Attending anesthesiologist reviewed and agrees with Pre Eval content              Jenn Ocampo MD   4/11/2019

## 2019-04-11 NOTE — OP NOTE
Pre Op Diagnosis:   1. Cervical DDD,   2.  Cervical spondylosis with resultant spinal stenosis C3-C5  3. Cervical radiculopathy   4. Cervical myelopathy     Post Op Diagnosis: Same     Procedure:    1. Anterior Cervical Discectomy and Fusion C3 4 and C4 5  2. PEK Spacer placement ×2 using the RTI spacers  3. Anterior plate fixation C3 4 and C4 5 using the Alphatec Trestle plate  4. Use of Allograft bone (Vibone)  5. Use of the surgical microscope  6. Use of intraoperative flouroscopy  7. Use of SSEP, MEP and continuous EMG monitoring     Surgeon: Seema Borges     Assistant: Madyson Enriquez PA-C, [place] assisted throughout all key components of this case by retracting soft  tissues and helping to enable adequate visualization to safely complete all  aspects of this procedure. He was scrubbed throughout the entirety of the  procedure. Anaesthesia: GETA     EBL: 27FH     Complications:none     Implants: See brief op note              PROCEDURE IN DETAIL  The patient was seen in the preoperative holding area. Prior to the  procedure, the operative site was confirmed and marked, and then the patient  was taken to the operating room. Once in the operating room, the patient was  positioned in a supine position on a standard operating room table. All bony  prominences are padded. The arms are padded and tucked by the patient's  side. Traction was placed across the shoulder using 3 inch wide silk tape. The C-arm was used to localize a site for the incision. The skin overlying  the operative level was marked. The C-arm was taken out. The anterior  cervical spine was prepped and draped in a sterile fashion. A time-out was  called and all in the room agreed to proceed. A right sided Ga-Chavira approach was used to expose the operative levels(C3-C5).    Through a transverse incision, the dissection was carried down through the  subcutaneous skin and through the platysma, and then developing the usual fascial elements were decompressed & the endplates were prepared for fusion by decorticating them with a high speed chastity. Once this was done, the trials were used to determine the appropriate size spacer. An 8 mm spacer was then obtained and packed with allograft bone and tamped into the disc space. The caspar pins were then removed and the holes left from the caspar pins were filled with surgiflo. The appropriate size plate was then selected  from the Alphate trestle anterior cervical plating system. The plate was affixed  to the vertebral bodies with 2 screws being placed cephalad and 2 screws  being placed caudad to the interbodies. All 6 screws obtained good purchase  and locked into the plate without any complication. Once this was completed,  the self-retaining retractor which had been used throughout the  case was removed. The surrounding soft tissues were inspected for any  evidence of excessive bleeding or iatrogenic injury; none was noted. The  C-arm was brought in and final C-arm images were made showing satisfactory  positioning of the anterior plate and screws as well as interbody spacer(s) and  hardware. The wound was irrigated one last time and then closed. The platysma was  reapproximated with 3-0 Vicryl and the subcutaneous skin was reapproximated  with 3-0 Vicryl and the skin edges were reapproximated with a running  subcuticular Monocryl. Mastisol, Steri-Strips and sterile dressings were  applied. The patient was placed in a rigid collar, awakened from general  endotracheal anesthesia, extubated and taken to the recovery room in  satisfactory condition. There were no complications. All counts were  correct at the end of the case. There were no monitoring events noted throughout the case.

## 2019-04-12 VITALS
WEIGHT: 250 LBS | SYSTOLIC BLOOD PRESSURE: 133 MMHG | HEART RATE: 80 BPM | TEMPERATURE: 98.1 F | DIASTOLIC BLOOD PRESSURE: 69 MMHG | OXYGEN SATURATION: 92 % | HEIGHT: 72 IN | BODY MASS INDEX: 33.86 KG/M2 | RESPIRATION RATE: 16 BRPM

## 2019-04-12 LAB
HCT VFR BLD CALC: 39.3 % (ref 42–52)
HEMOGLOBIN: 13.3 G/DL (ref 14–18)
MCH RBC QN AUTO: 30.1 PG (ref 27–31)
MCHC RBC AUTO-ENTMCNC: 33.8 G/DL (ref 33–37)
MCV RBC AUTO: 88.9 FL (ref 80–94)
PDW BLD-RTO: 13 % (ref 11.5–14.5)
PLATELET # BLD: 152 K/UL (ref 130–400)
PMV BLD AUTO: 13 FL (ref 9.4–12.4)
RBC # BLD: 4.42 M/UL (ref 4.7–6.1)
WBC # BLD: 11 K/UL (ref 4.8–10.8)

## 2019-04-12 PROCEDURE — 6360000002 HC RX W HCPCS

## 2019-04-12 PROCEDURE — 97161 PT EVAL LOW COMPLEX 20 MIN: CPT

## 2019-04-12 PROCEDURE — 36415 COLL VENOUS BLD VENIPUNCTURE: CPT

## 2019-04-12 PROCEDURE — 94762 N-INVAS EAR/PLS OXIMTRY CONT: CPT

## 2019-04-12 PROCEDURE — 6370000000 HC RX 637 (ALT 250 FOR IP)

## 2019-04-12 PROCEDURE — 97165 OT EVAL LOW COMPLEX 30 MIN: CPT

## 2019-04-12 PROCEDURE — 97116 GAIT TRAINING THERAPY: CPT

## 2019-04-12 PROCEDURE — 2700000000 HC OXYGEN THERAPY PER DAY

## 2019-04-12 PROCEDURE — 85027 COMPLETE CBC AUTOMATED: CPT

## 2019-04-12 PROCEDURE — 97535 SELF CARE MNGMENT TRAINING: CPT

## 2019-04-12 RX ORDER — OXYCODONE HYDROCHLORIDE AND ACETAMINOPHEN 5; 325 MG/1; MG/1
1 TABLET ORAL EVERY 4 HOURS PRN
Qty: 40 TABLET | Refills: 0 | Status: SHIPPED | OUTPATIENT
Start: 2019-04-12 | End: 2019-04-26

## 2019-04-12 RX ADMIN — METFORMIN HYDROCHLORIDE 500 MG: 500 TABLET ORAL at 07:57

## 2019-04-12 RX ADMIN — HYDROCHLOROTHIAZIDE 25 MG: 25 TABLET ORAL at 07:57

## 2019-04-12 RX ADMIN — LOSARTAN POTASSIUM 100 MG: 100 TABLET ORAL at 07:57

## 2019-04-12 RX ADMIN — CEFAZOLIN SODIUM 1 G: 1 INJECTION, SOLUTION INTRAVENOUS at 03:33

## 2019-04-12 RX ADMIN — OXYCODONE HYDROCHLORIDE AND ACETAMINOPHEN 2 TABLET: 5; 325 TABLET ORAL at 09:16

## 2019-04-12 RX ADMIN — OXYCODONE HYDROCHLORIDE AND ACETAMINOPHEN 2 TABLET: 5; 325 TABLET ORAL at 04:44

## 2019-04-12 ASSESSMENT — PAIN - FUNCTIONAL ASSESSMENT: PAIN_FUNCTIONAL_ASSESSMENT: PREVENTS OR INTERFERES SOME ACTIVE ACTIVITIES AND ADLS

## 2019-04-12 ASSESSMENT — PAIN DESCRIPTION - DESCRIPTORS: DESCRIPTORS: SORE

## 2019-04-12 ASSESSMENT — PAIN DESCRIPTION - PAIN TYPE: TYPE: SURGICAL PAIN

## 2019-04-12 ASSESSMENT — PAIN SCALES - GENERAL
PAINLEVEL_OUTOF10: 4
PAINLEVEL_OUTOF10: 7
PAINLEVEL_OUTOF10: 3
PAINLEVEL_OUTOF10: 7

## 2019-04-12 ASSESSMENT — PAIN DESCRIPTION - LOCATION: LOCATION: NECK

## 2019-04-12 NOTE — CONSULTS
Referring Provider: Dr. Juanpablo Van  Reason for Consultation: Medical management    Patient Care Team:  Senait Odonnell MD as PCP - [facility])    Chief complaint neck pain with radicular features    Subjective . History of present illness: The patient presents to the orthopedic spine surgery service for Anterior Cervical Discectomy and Fusion C3 4 and C4 5. They have failed outpatient conservative treatment of NSAIDS, muscle relaxer, physical therapy and opioid pain meds. The pain is affecting their activities of daily living and they have chosen to undergo surgical correction. We have been consulted in the perioperative period due to the fact their Primary Care Provider does not attend here at [facility]. The postoperative pain is as expected. There are no other participating or relieving factors noted.       REVIEW OF SYSTEMS:    CONSTITUTIONAL:  Negative for anorexia, chills, fevers, night sweats and weight loss  EYES:  negative for eye dryness, icterus and redness  HEENT:   negative for dental problems, epistaxis, facial trauma and thrush  RESPIRATORY:  negative for chest tightness, cough, dyspnea on exertion, pneumonia and sputum  CARDIOVASCULAR: negative for chest pain, dyspnea, exertional chest pressure/discomfort, irregular heart beat, palpitations, paroxysmal nocturnal dyspnea and syncope  GASTROINTESTINAL:  negative for abdominal pain, hematemesis, jaundice, melena and rectal bleeding  MUSCULOSKELETAL:  Positive for cervical muscle weakness, myalgias and neck pain  NEUROLOGICAL:   negative for dizziness, headaches, seizures, speech problems, tremors and vertigo  INTEGUMENT: negative for pruritus, rash, skin color change and skin lesion(s)   A Full 14 point review of systems is negative outside those listed above and in the HPI      History    Past Medical History:   Diagnosis Date    Cervical myelopathy ([facility] [address] 4/11/2019    Degeneration of intervertebral disc at C4-C5 level 2019    Diabetes mellitus ([address]     Hyperlipidemia     Hypertension      Past Surgical History:   Procedure Laterality Date    BACK SURGERY      CARDIAC SURGERY  1999    CABG X 4V    CERVICAL FUSION N/A 2019    C3-4 C4-5 ACDF performed by Blayne Wong MD at [address] [place] [address] Right     scope, age 35ish      Family History   Problem Relation Age of Onset    Heart Disease Mother     Diabetes Mother     Cancer Father         LUNG CA     Social History     Tobacco Use    Smoking status: Former Smoker     Types: Cigarettes     Last attempt to quit: 1995     Years since quittin.0    Smokeless tobacco: Never Used   Substance Use Topics    Alcohol use: No    Drug use: No     Medications Prior to Admission: metFORMIN (GLUCOPHAGE) 500 MG tablet, Take 500 mg by mouth daily (with breakfast)  atorvastatin (LIPITOR) 40 MG tablet, Take 40 mg by mouth daily  losartan-hydrochlorothiazide (HYZAAR) 100-25 MG per tablet, Take 1 tablet by mouth daily  meloxicam (MOBIC) 15 MG tablet, Take 15 mg by mouth daily  Patient has no known allergies. Objective     Vital Signs   All pre-op and post op vitals reviewed           Physical Exam:  Constitutional: oriented to person, place, and time. appears well-developed. HEENT:   Head: Normocephalic and atraumatic. Eyes: Pupils are equal, round, and reactive to light. Neck: Neck supple. Cardiovascular: Regular rhythm and normal heart sounds. Pulmonary/Chest: Effort normal and breath sounds normal. CTAB  Abdominal: Soft. Bowel sounds are normal. He exhibits no distension. There is no tenderness. There is no rebound and no guarding. Musculoskeletal: Restricted range of motion of the neck otherwise normal Post-op changes noted  Neurological:  alert and oriented to person, place, and time. normal reflexes. No focal deficits  Skin: Skin is warm and dry. No new rashes appreciated.     Results Review:   I reviewed the patient's new imaging results and agree with the interpretation. Active Problems:    Degeneration of intervertebral disc at C4-C5 level    Cervical myelopathy (HCC)    Diabetes mellitus type 2--Glucophage 500 daily    Essential hypertension-Hyzaar 100/25    Mixed dyslipidemia-on statin therapy    Acute postoperative blood loss anemia-stable, expected, being monitored with daily labs    Slow transit constipation    Anemia post-op expected-check iron, B12,and folate. Replenish as needed. Transfuse at acceptable levels depending on clinical judgement and comorbidities. Recheck in AM  Constipation-start with Miralax 1 capful BID until BM, then decrease to 1 x a day,then can step up to Amitizia 24 mcg po BID which can be used for opiod induced constipation,and ultimately end up with Relistor 12 mcg sub Q q 48 hours to block the effect of narcotics on the gut. Hypertension-review pre and post BP's,will restart home BP meds when BP allows. Add Clonidine 0.1 mg q 4 hours prn if bp> 140/90,monitor BP and adjust meds as necessary. Blood sugars noted. Explained to patient the need for better control to optimize the healing process. Reviewed home medication regimen, IV fluids, and dietary intake over the last 24 hours to help determine the etiology of the hyperglycemia. Adjustments made and discussed with staff, see orders for further details. Medically stable postop day #1Anterior Cervical Discectomy and Fusion C3 4 and C4 5      I discussed the patients findings and my recommendations with patient/family, staff and the Orthopaedic surgical spine team.  Romeo Smith  04/12/19  6:50 AM      Postop day #1 after anterior cervical fusion C3-4-5. The patient was seen on rounds today. Reviewed the last 24 hours of labs and x-rays that are available. Updated overnight status with nursing staff. Reviewed the case with Papito Bolton PA-C. All questions were answered to the patient's/family's understanding.  Patient is medically

## 2019-04-12 NOTE — PROGRESS NOTES
Occupational Therapy   Occupational Therapy Initial Assessment  Date: 2019   Patient Name: Dolores Ashton  MRN: [id number]     : 1950    Date of Service: 2019    Discharge Recommendations:  Continue to assess pending progress       Assessment   Performance deficits / Impairments: Decreased functional mobility ; Decreased endurance;Decreased ADL status; Decreased balance;Decreased high-level IADLs;Decreased strength  Assessment: Pt benefits from skilled OT to address decreased pt I to complete functional mobility, balance, endurance, and ADL tasks s/p ACDF C3-4, C4-5  Treatment Diagnosis: ACDF C3-4, C4-5  Prognosis: Good  Decision Making: Low Complexity  REQUIRES OT FOLLOW UP: Yes  Activity Tolerance  Activity Tolerance: Patient Tolerated treatment well  Safety Devices  Safety Devices in place: Yes  Type of devices: Call light within reach;Nurse notified; Left in chair           Patient Diagnosis(es): The encounter diagnosis was Cervical myelopathy ([facility] [address] has a past medical history of Cervical myelopathy ([facility] [address] Degeneration of intervertebral disc at C4-C5 level, Diabetes mellitus ([facility] [address] Hyperlipidemia, and Hypertension. has a past surgical history that includes back surgery; knee surgery (Right); Cholecystectomy; Cardiac surgery (1999); and cervical fusion (N/A, 2019).     Treatment Diagnosis: ACDF C3-4, C4-5      Restrictions  Restrictions/Precautions  Restrictions/Precautions: Surgical Protocols  Required Braces or Orthoses?: Yes  Required Braces or Orthoses  Cervical: c-collar  Position Activity Restriction  Spinal Precautions: No Bending, No Lifting, No Twisting  Spinal Precautions: c spine    Subjective   General  Patient assessed for rehabilitation services?: Yes  Referring Practitioner: Dr. Rosita Wallace   Diagnosis: ACDF C3-4, C4-5  Subjective  Subjective: Pt pleasant and cooperative for session   Pain Assessment  Pain Assessment: 0-10  Pain Level: 3  Pain Type: Surgical pain  Pain Location:

## 2019-04-12 NOTE — DISCHARGE SUMMARY
[facility]  DISCHARGE SUMMARY  AUTHOR: Maryann Olvera MD    Patient ID:  Lisseth Verma  [id number]  32 y.o.  1950    Admit date: 4/11/2019    Discharge date and time: 04/12/19    Hospital LOS: 1    Admitting Physician: Maryann Olvera MD     Indication for Admission: Planned admit for surgical procedure    Admission Diagnoses: Cervical myelopathy ([address] [G95.9]    Discharge Diagnoses: Cervical myelopathy ([address] [G95.9]    Procedures: 1. ACDF C3-C5    Problem List:   Patient Active Problem List    Diagnosis Date Noted    Degeneration of intervertebral disc at C4-C5 level 04/11/2019    Cervical myelopathy ([facility] [address] 04/11/2019       Consults: none    Admission Condition: stable    Discharged Condition: stable    Hospital Course: no immediate post operative complication    Disposition: home    Patient Instructions:    Mynor Lyman   Home Medication Instructions [id number]    Printed on:04/12/19 0914   Medication Information                      atorvastatin (LIPITOR) 40 MG tablet  Take 40 mg by mouth daily             losartan-hydrochlorothiazide (HYZAAR) 100-25 MG per tablet  Take 1 tablet by mouth daily             metFORMIN (GLUCOPHAGE) 500 MG tablet  Take 500 mg by mouth daily (with breakfast)             oxyCODONE-acetaminophen (PERCOCET) 5-325 MG per tablet  Take 1 tablet by mouth every 4 hours as needed for Pain for up to 14 days. Activity: Avoid bending lifting or twisting, brace at all times except eating and hygiene, no driving while taking narcotic pain medication, walk 10-15 minutes 2-3 times daily  Diet: regular diet  Wound Care: leave Prineo intact until follow up  Other: [address] [place] [address] office with questions or concerns    Follow-up with Dr Valente Malik or PA's in 2 weeks.     Electronically signed by Maryann Olvera MD on 4/12/19 at 9:14 AM

## 2019-04-12 NOTE — PROGRESS NOTES
Physical Therapy    Facility/Department: [facility] SURG SERVICES  Initial Assessment    NAME: Kenyatta Slade  : 1950  MRN: [id number]    Date of Service: 2019    Discharge Recommendations:  Continue to assess pending progress, Home with assist PRN, 24 hour supervision or assist        Assessment   Body structures, Functions, Activity limitations: Decreased functional mobility ; Decreased balance; Increased Pain;Decreased strength;Decreased ROM  Assessment: Pt. tolerated mobility well. Pt. would be safe to d/c home with 24 hr family A for 1st few days and decrease to A PRN. Pt. with no real LOB, but did need to steady himself on IV pole. Treatment Diagnosis: impaired gait and mobility  Prognosis: Excellent  Decision Making: Low Complexity  Patient Education: purpose of PT, safety, fall risk, staff A, use of c collar  Barriers to Learning: none noted  No Skilled PT: Safe to return home  REQUIRES PT FOLLOW UP: Yes  Activity Tolerance  Activity Tolerance: Patient Tolerated treatment well       Patient Diagnosis(es): The encounter diagnosis was Cervical myelopathy ([address] has a past medical history of Cervical myelopathy ([address] Degeneration of intervertebral disc at C4-C5 level, Diabetes mellitus ([address] Hyperlipidemia, and Hypertension. has a past surgical history that includes back surgery; knee surgery (Right); Cholecystectomy; Cardiac surgery (1999); and cervical fusion (N/A, 2019).     Restrictions  Restrictions/Precautions  Restrictions/Precautions: Surgical Protocols  Required Braces or Orthoses?: Yes  Required Braces or Orthoses  Cervical: c-collar  Position Activity Restriction  Spinal Precautions: No Bending, No Lifting, No Twisting  Spinal Precautions: c spine  Vision/Hearing  Hearing: Within functional limits     Subjective  General  Chart Reviewed: Yes  Patient assessed for rehabilitation services?: Yes  Response To Previous Treatment: Not applicable  Family / Caregiver Present: and up his arms)  Bed mobility  Supine to Sit: Stand by assistance  Sit to Supine: Unable to assess  Comment: pt. sat on EOB x 5 mins for instruction on c collar  Transfers  Sit to Stand: Stand by assistance  Stand to sit: Stand by assistance  Ambulation  Ambulation?: Yes  WB Status: no restrictions  Ambulation 1  Surface: level tile  Device: No Device  Assistance: Stand by assistance  Quality of Gait: good pace, pt. using IV pole to stabilize  Distance: 180'  Comments: no LOB, IV     Balance  Posture: Good  Sitting - Static: Good;+  Sitting - Dynamic: Good;+  Standing - Static: Good;-  Standing - Dynamic: Good;-        Plan   Plan  Times per week: 6-7  Times per day: Twice a day  Plan weeks: 2 wks  Current Treatment Recommendations: Strengthening, Balance Training, Functional Mobility Training, Stair training, Gait Training, Transfer Training, Safety Education & Training, Pain Management, Patient/Caregiver Education & Training, Equipment Evaluation, Education, & procurement, Positioning  Plan Comment: cont. PT per POC.   Safety Devices  Type of devices: Call light within reach, Gait belt, Left in chair, Nurse notified    G-Code       OutComes Score                                                  AM-PAC Score             Goals  Short term goals  Time Frame for Short term goals: 2 wks  Short term goal 1: supine to sit indep  Short term goal 2: sit to stand indep  Short term goal 3: amb. 300' with no AD, not holding onto IV pole, indep  Short term goal 4: up 3-5 stairs indep  Patient Goals   Patient goals : go home today       Therapy Time   Individual Concurrent Group Co-treatment   Time In           Time Out           Minutes                   Cecile Lennox, PT    Electronically signed by Cecile Lennox, PT on 4/12/2019 at 9:41 AM

## 2019-08-23 PROBLEM — M50.321 DEGENERATION OF INTERVERTEBRAL DISC AT C4-C5 LEVEL: Status: ACTIVE | Noted: 2019-04-11

## 2019-08-23 PROBLEM — G95.9 CERVICAL MYELOPATHY: Status: ACTIVE | Noted: 2019-04-11

## 2019-08-23 RX ORDER — LOSARTAN POTASSIUM AND HYDROCHLOROTHIAZIDE 25; 100 MG/1; MG/1
1 TABLET ORAL DAILY
COMMUNITY

## 2019-08-23 RX ORDER — ATORVASTATIN CALCIUM 40 MG/1
40 TABLET, FILM COATED ORAL DAILY
COMMUNITY

## 2019-08-23 NOTE — PROGRESS NOTES
Mr. Ho is 68 y.o. male    CHIEF COMPLAINT: I am here to discuss a circumcision.     HPI  Patient here today with issue of penile foreskin.  This been an issue now for over 1 year.  Nature of this is recurrent pruritic infections.  Minimal response to topical steroids and antibiotics/antifungals.  This is been gradual onset.  He does have type 2 diabetes mellitus.    The following portions of the patient's history were reviewed and updated as appropriate: allergies, current medications, past family history, past medical history, past social history, past surgical history and problem list.      Review of Systems   Constitutional: Negative for appetite change, chills, fever and unexpected weight change.   HENT: Negative for congestion, ear pain, facial swelling, hearing loss, nosebleeds, trouble swallowing and voice change.    Eyes: Negative for photophobia, pain, discharge and visual disturbance.   Respiratory: Negative for cough, choking, chest tightness and shortness of breath.    Cardiovascular: Negative for chest pain and palpitations.   Gastrointestinal: Negative for abdominal distention, abdominal pain, blood in stool, constipation, diarrhea, nausea and vomiting.   Endocrine: Negative for cold intolerance, heat intolerance and polydipsia.   Genitourinary: Negative for discharge, dysuria, flank pain, frequency, genital sores, hematuria, scrotal swelling, testicular pain and urgency.   Musculoskeletal: Negative for arthralgias, joint swelling, neck pain and neck stiffness.   Skin: Negative for pallor and rash.   Allergic/Immunologic: Negative for immunocompromised state.   Neurological: Negative for dizziness, tremors, seizures, syncope, light-headedness and headaches.   Hematological: Negative for adenopathy. Does not bruise/bleed easily.   Psychiatric/Behavioral: Negative for agitation, confusion, dysphoric mood, hallucinations, self-injury and suicidal ideas.         Current Outpatient Medications:   •   "atorvastatin (LIPITOR) 40 MG tablet, Take 40 mg by mouth., Disp: , Rfl:   •  losartan-hydrochlorothiazide (HYZAAR) 100-25 MG per tablet, Take 1 tablet by mouth., Disp: , Rfl:   •  meloxicam (MOBIC) 15 MG tablet, , Disp: , Rfl:   •  metFORMIN (GLUCOPHAGE) 500 MG tablet, Take 500 mg by mouth., Disp: , Rfl:   •  clotrimazole-betamethasone (LOTRISONE) 1-0.05 % cream, Apply  topically to the appropriate area as directed 2 (Two) Times a Day for 28 days., Disp: 45 g, Rfl: 2    Past Medical History:   Diagnosis Date   • Diabetes mellitus (CMS/HCC)    • High cholesterol    • Hypertension        Past Surgical History:   Procedure Laterality Date   • BACK SURGERY     • CHOLECYSTECTOMY     • CORONARY ARTERY BYPASS GRAFT     • KNEE ARTHROSCOPY     • NECK SURGERY         Social History     Socioeconomic History   • Marital status:      Spouse name: Not on file   • Number of children: Not on file   • Years of education: Not on file   • Highest education level: Not on file   Tobacco Use   • Smoking status: Former Smoker   • Smokeless tobacco: Never Used   Substance and Sexual Activity   • Alcohol use: No     Frequency: Never   • Drug use: No   • Sexual activity: Defer       Family History   Problem Relation Age of Onset   • No Known Problems Father    • No Known Problems Mother          Temp 96.1 °F (35.6 °C)   Ht 182.9 cm (72\")   Wt 113 kg (250 lb)   BMI 33.91 kg/m²       Physical Exam  Constitutional: Well nourished, Well developed; No apparent distress.  His vital signs are reviewed  Psychiatric: Appropriate affect; Alert and oriented  Eyes: Unremarkable  Musculoskeletal: Normal gait and station  GI: Abdomen is soft, non-tender  Respiratory: No distress; Unlabored movement; No accessory musculature needed with symmetric movements  Skin: No pallor or diaphoresis  ; the penis shows phimosis with significant cracks, pigment changes, but no evidence of mass.  These are consistent with chronic fungal balanoposthitis " episodes.      Data      Assessment and Plan  Diagnoses and all orders for this visit:    Phimosis  -     Cancel: POC Urinalysis Dipstick, Multipro    Balanoposthitis  -     clotrimazole-betamethasone (LOTRISONE) 1-0.05 % cream; Apply  topically to the appropriate area as directed 2 (Two) Times a Day for 28 days.  -     Case Request; Standing    Other orders  -     Follow Anesthesia Guidelines / Standing Orders; Future  -     Obtain Informed Consent; Future  -     Provide NPO Instructions to Patient; Future  -     Chlorhexidine Skin Prep; Future  -     Follow Anesthesia Guidelines / Standing Orders; Standing  -     Obtain Informed Consent; Standing      I will place patient on Lotrisone cream twice daily.  He has an upcoming busy schedule including a wedding.  I have recommended he undergo circumcision.  The risks of this including meatal stenosis, cosmetic appearance could result in buried penis especially with weight gain, infection and bleeding.          (Please note that portions of this note were completed with a voice recognition program.)  Ian Terrell MD  08/27/19  8:57 AM

## 2019-08-27 ENCOUNTER — OFFICE VISIT (OUTPATIENT)
Dept: UROLOGY | Facility: CLINIC | Age: 69
End: 2019-08-27

## 2019-08-27 VITALS — WEIGHT: 250 LBS | TEMPERATURE: 96.1 F | HEIGHT: 72 IN | BODY MASS INDEX: 33.86 KG/M2

## 2019-08-27 DIAGNOSIS — N47.1 PHIMOSIS: Primary | ICD-10-CM

## 2019-08-27 DIAGNOSIS — N47.6 BALANOPOSTHITIS: ICD-10-CM

## 2019-08-27 PROCEDURE — 99203 OFFICE O/P NEW LOW 30 MIN: CPT | Performed by: UROLOGY

## 2019-08-27 RX ORDER — CLOTRIMAZOLE AND BETAMETHASONE DIPROPIONATE 10; .64 MG/G; MG/G
CREAM TOPICAL 2 TIMES DAILY
Qty: 45 G | Refills: 2 | Status: SHIPPED | OUTPATIENT
Start: 2019-08-27 | End: 2019-08-28

## 2019-08-27 RX ORDER — MELOXICAM 15 MG/1
15 TABLET ORAL DAILY
Status: ON HOLD | COMMUNITY
Start: 2019-08-09 | End: 2022-09-20

## 2019-08-28 ENCOUNTER — APPOINTMENT (OUTPATIENT)
Dept: PREADMISSION TESTING | Facility: HOSPITAL | Age: 69
End: 2019-08-28

## 2019-08-28 VITALS
HEART RATE: 70 BPM | WEIGHT: 245.81 LBS | SYSTOLIC BLOOD PRESSURE: 137 MMHG | HEIGHT: 73 IN | RESPIRATION RATE: 18 BRPM | OXYGEN SATURATION: 96 % | DIASTOLIC BLOOD PRESSURE: 61 MMHG | BODY MASS INDEX: 32.58 KG/M2

## 2019-08-28 LAB
ANION GAP SERPL CALCULATED.3IONS-SCNC: 10 MMOL/L (ref 4–13)
BUN BLD-MCNC: 19 MG/DL (ref 5–21)
BUN/CREAT SERPL: 20.4 (ref 7–25)
CALCIUM SPEC-SCNC: 10 MG/DL (ref 8.4–10.4)
CHLORIDE SERPL-SCNC: 103 MMOL/L (ref 98–110)
CO2 SERPL-SCNC: 27 MMOL/L (ref 24–31)
CREAT BLD-MCNC: 0.93 MG/DL (ref 0.5–1.4)
DEPRECATED RDW RBC AUTO: 39.5 FL (ref 37–54)
ERYTHROCYTE [DISTWIDTH] IN BLOOD BY AUTOMATED COUNT: 13 % (ref 12.3–15.4)
GFR SERPL CREATININE-BSD FRML MDRD: 81 ML/MIN/1.73
GLUCOSE BLD-MCNC: 124 MG/DL (ref 70–100)
HCT VFR BLD AUTO: 40.8 % (ref 37.5–51)
HGB BLD-MCNC: 14.3 G/DL (ref 13–17.7)
MCH RBC QN AUTO: 29.2 PG (ref 26.6–33)
MCHC RBC AUTO-ENTMCNC: 35 G/DL (ref 31.5–35.7)
MCV RBC AUTO: 83.3 FL (ref 79–97)
PLATELET # BLD AUTO: 156 10*3/MM3 (ref 140–450)
PMV BLD AUTO: 12.7 FL (ref 6–12)
POTASSIUM BLD-SCNC: 3.7 MMOL/L (ref 3.5–5.3)
RBC # BLD AUTO: 4.9 10*6/MM3 (ref 4.14–5.8)
SODIUM BLD-SCNC: 140 MMOL/L (ref 135–145)
WBC NRBC COR # BLD: 5.37 10*3/MM3 (ref 3.4–10.8)

## 2019-08-28 PROCEDURE — 93010 ELECTROCARDIOGRAM REPORT: CPT | Performed by: INTERNAL MEDICINE

## 2019-08-28 PROCEDURE — 80048 BASIC METABOLIC PNL TOTAL CA: CPT | Performed by: UROLOGY

## 2019-08-28 PROCEDURE — 36415 COLL VENOUS BLD VENIPUNCTURE: CPT

## 2019-08-28 PROCEDURE — 93005 ELECTROCARDIOGRAM TRACING: CPT

## 2019-08-28 PROCEDURE — 85027 COMPLETE CBC AUTOMATED: CPT | Performed by: UROLOGY

## 2019-09-04 ENCOUNTER — ANESTHESIA EVENT (OUTPATIENT)
Dept: PERIOP | Facility: HOSPITAL | Age: 69
End: 2019-09-04

## 2019-09-04 ENCOUNTER — ANESTHESIA (OUTPATIENT)
Dept: PERIOP | Facility: HOSPITAL | Age: 69
End: 2019-09-04

## 2019-09-04 ENCOUNTER — HOSPITAL ENCOUNTER (OUTPATIENT)
Facility: HOSPITAL | Age: 69
Setting detail: HOSPITAL OUTPATIENT SURGERY
Discharge: HOME | End: 2019-09-04
Attending: UROLOGY | Admitting: UROLOGY

## 2019-09-04 VITALS
TEMPERATURE: 98.9 F | SYSTOLIC BLOOD PRESSURE: 135 MMHG | RESPIRATION RATE: 16 BRPM | DIASTOLIC BLOOD PRESSURE: 77 MMHG | OXYGEN SATURATION: 95 % | HEART RATE: 69 BPM

## 2019-09-04 DIAGNOSIS — N47.6 BALANOPOSTHITIS: ICD-10-CM

## 2019-09-04 LAB
GLUCOSE BLDC GLUCOMTR-MCNC: 119 MG/DL (ref 70–130)
GLUCOSE BLDC GLUCOMTR-MCNC: 124 MG/DL (ref 70–130)

## 2019-09-04 PROCEDURE — 25010000002 PROPOFOL 10 MG/ML EMULSION: Performed by: NURSE ANESTHETIST, CERTIFIED REGISTERED

## 2019-09-04 PROCEDURE — 25010000003 CEFAZOLIN PER 500 MG: Performed by: NURSE ANESTHETIST, CERTIFIED REGISTERED

## 2019-09-04 PROCEDURE — 88312 SPECIAL STAINS GROUP 1: CPT | Performed by: UROLOGY

## 2019-09-04 PROCEDURE — 54161 CIRCUM 28 DAYS OR OLDER: CPT | Performed by: UROLOGY

## 2019-09-04 PROCEDURE — 82962 GLUCOSE BLOOD TEST: CPT

## 2019-09-04 PROCEDURE — 25010000002 ONDANSETRON PER 1 MG: Performed by: NURSE ANESTHETIST, CERTIFIED REGISTERED

## 2019-09-04 PROCEDURE — 25010000002 FENTANYL CITRATE (PF) 100 MCG/2ML SOLUTION: Performed by: NURSE ANESTHETIST, CERTIFIED REGISTERED

## 2019-09-04 PROCEDURE — 88304 TISSUE EXAM BY PATHOLOGIST: CPT | Performed by: UROLOGY

## 2019-09-04 RX ORDER — SODIUM CHLORIDE, SODIUM LACTATE, POTASSIUM CHLORIDE, CALCIUM CHLORIDE 600; 310; 30; 20 MG/100ML; MG/100ML; MG/100ML; MG/100ML
1000 INJECTION, SOLUTION INTRAVENOUS CONTINUOUS
Status: DISCONTINUED | OUTPATIENT
Start: 2019-09-04 | End: 2019-09-04 | Stop reason: HOSPADM

## 2019-09-04 RX ORDER — HYDROCODONE BITARTRATE AND ACETAMINOPHEN 5; 325 MG/1; MG/1
1 TABLET ORAL ONCE AS NEEDED
Status: DISCONTINUED | OUTPATIENT
Start: 2019-09-04 | End: 2019-09-04 | Stop reason: HOSPADM

## 2019-09-04 RX ORDER — OXYCODONE AND ACETAMINOPHEN 7.5; 325 MG/1; MG/1
2 TABLET ORAL EVERY 4 HOURS PRN
Status: DISCONTINUED | OUTPATIENT
Start: 2019-09-04 | End: 2019-09-04 | Stop reason: HOSPADM

## 2019-09-04 RX ORDER — OXYCODONE AND ACETAMINOPHEN 10; 325 MG/1; MG/1
1 TABLET ORAL ONCE AS NEEDED
Status: DISCONTINUED | OUTPATIENT
Start: 2019-09-04 | End: 2019-09-04 | Stop reason: HOSPADM

## 2019-09-04 RX ORDER — HYDROCODONE BITARTRATE AND ACETAMINOPHEN 5; 325 MG/1; MG/1
1 TABLET ORAL EVERY 4 HOURS PRN
Qty: 12 TABLET | Refills: 0 | Status: SHIPPED | OUTPATIENT
Start: 2019-09-04 | End: 2019-09-07

## 2019-09-04 RX ORDER — BUPIVACAINE HYDROCHLORIDE 2.5 MG/ML
INJECTION, SOLUTION INFILTRATION; PERINEURAL AS NEEDED
Status: DISCONTINUED | OUTPATIENT
Start: 2019-09-04 | End: 2019-09-04 | Stop reason: HOSPADM

## 2019-09-04 RX ORDER — DEXTROSE MONOHYDRATE 25 G/50ML
12.5 INJECTION, SOLUTION INTRAVENOUS AS NEEDED
Status: DISCONTINUED | OUTPATIENT
Start: 2019-09-04 | End: 2019-09-04 | Stop reason: HOSPADM

## 2019-09-04 RX ORDER — SODIUM CHLORIDE, SODIUM LACTATE, POTASSIUM CHLORIDE, CALCIUM CHLORIDE 600; 310; 30; 20 MG/100ML; MG/100ML; MG/100ML; MG/100ML
9 INJECTION, SOLUTION INTRAVENOUS CONTINUOUS
Status: DISCONTINUED | OUTPATIENT
Start: 2019-09-04 | End: 2019-09-04 | Stop reason: HOSPADM

## 2019-09-04 RX ORDER — SODIUM CHLORIDE 0.9 % (FLUSH) 0.9 %
3-10 SYRINGE (ML) INJECTION AS NEEDED
Status: DISCONTINUED | OUTPATIENT
Start: 2019-09-04 | End: 2019-09-04 | Stop reason: HOSPADM

## 2019-09-04 RX ORDER — LIDOCAINE HYDROCHLORIDE 20 MG/ML
INJECTION, SOLUTION INFILTRATION; PERINEURAL AS NEEDED
Status: DISCONTINUED | OUTPATIENT
Start: 2019-09-04 | End: 2019-09-04 | Stop reason: SURG

## 2019-09-04 RX ORDER — FENTANYL CITRATE 50 UG/ML
25 INJECTION, SOLUTION INTRAMUSCULAR; INTRAVENOUS
Status: DISCONTINUED | OUTPATIENT
Start: 2019-09-04 | End: 2019-09-04 | Stop reason: HOSPADM

## 2019-09-04 RX ORDER — IBUPROFEN 600 MG/1
600 TABLET ORAL ONCE AS NEEDED
Status: DISCONTINUED | OUTPATIENT
Start: 2019-09-04 | End: 2019-09-04 | Stop reason: HOSPADM

## 2019-09-04 RX ORDER — LABETALOL HYDROCHLORIDE 5 MG/ML
5 INJECTION, SOLUTION INTRAVENOUS
Status: DISCONTINUED | OUTPATIENT
Start: 2019-09-04 | End: 2019-09-04 | Stop reason: HOSPADM

## 2019-09-04 RX ORDER — CEFAZOLIN SODIUM 1 G/3ML
INJECTION, POWDER, FOR SOLUTION INTRAMUSCULAR; INTRAVENOUS AS NEEDED
Status: DISCONTINUED | OUTPATIENT
Start: 2019-09-04 | End: 2019-09-04 | Stop reason: SURG

## 2019-09-04 RX ORDER — MIDAZOLAM HYDROCHLORIDE 1 MG/ML
1 INJECTION INTRAMUSCULAR; INTRAVENOUS
Status: DISCONTINUED | OUTPATIENT
Start: 2019-09-04 | End: 2019-09-04 | Stop reason: HOSPADM

## 2019-09-04 RX ORDER — ONDANSETRON 2 MG/ML
4 INJECTION INTRAMUSCULAR; INTRAVENOUS ONCE AS NEEDED
Status: DISCONTINUED | OUTPATIENT
Start: 2019-09-04 | End: 2019-09-04 | Stop reason: HOSPADM

## 2019-09-04 RX ORDER — METOCLOPRAMIDE HYDROCHLORIDE 5 MG/ML
5 INJECTION INTRAMUSCULAR; INTRAVENOUS
Status: DISCONTINUED | OUTPATIENT
Start: 2019-09-04 | End: 2019-09-04 | Stop reason: HOSPADM

## 2019-09-04 RX ORDER — SODIUM CHLORIDE 0.9 % (FLUSH) 0.9 %
3 SYRINGE (ML) INJECTION EVERY 12 HOURS SCHEDULED
Status: DISCONTINUED | OUTPATIENT
Start: 2019-09-04 | End: 2019-09-04 | Stop reason: HOSPADM

## 2019-09-04 RX ORDER — PROPOFOL 10 MG/ML
VIAL (ML) INTRAVENOUS AS NEEDED
Status: DISCONTINUED | OUTPATIENT
Start: 2019-09-04 | End: 2019-09-04 | Stop reason: SURG

## 2019-09-04 RX ORDER — MAGNESIUM HYDROXIDE 1200 MG/15ML
LIQUID ORAL AS NEEDED
Status: DISCONTINUED | OUTPATIENT
Start: 2019-09-04 | End: 2019-09-04 | Stop reason: HOSPADM

## 2019-09-04 RX ORDER — MIDAZOLAM HYDROCHLORIDE 1 MG/ML
2 INJECTION INTRAMUSCULAR; INTRAVENOUS
Status: DISCONTINUED | OUTPATIENT
Start: 2019-09-04 | End: 2019-09-04 | Stop reason: HOSPADM

## 2019-09-04 RX ORDER — SODIUM CHLORIDE 0.9 % (FLUSH) 0.9 %
3 SYRINGE (ML) INJECTION AS NEEDED
Status: DISCONTINUED | OUTPATIENT
Start: 2019-09-04 | End: 2019-09-04 | Stop reason: HOSPADM

## 2019-09-04 RX ORDER — FENTANYL CITRATE 50 UG/ML
25 INJECTION, SOLUTION INTRAMUSCULAR; INTRAVENOUS AS NEEDED
Status: DISCONTINUED | OUTPATIENT
Start: 2019-09-04 | End: 2019-09-04 | Stop reason: HOSPADM

## 2019-09-04 RX ORDER — IPRATROPIUM BROMIDE AND ALBUTEROL SULFATE 2.5; .5 MG/3ML; MG/3ML
3 SOLUTION RESPIRATORY (INHALATION) ONCE AS NEEDED
Status: DISCONTINUED | OUTPATIENT
Start: 2019-09-04 | End: 2019-09-04 | Stop reason: HOSPADM

## 2019-09-04 RX ORDER — FENTANYL CITRATE 50 UG/ML
INJECTION, SOLUTION INTRAMUSCULAR; INTRAVENOUS AS NEEDED
Status: DISCONTINUED | OUTPATIENT
Start: 2019-09-04 | End: 2019-09-04 | Stop reason: SURG

## 2019-09-04 RX ORDER — ONDANSETRON 2 MG/ML
INJECTION INTRAMUSCULAR; INTRAVENOUS AS NEEDED
Status: DISCONTINUED | OUTPATIENT
Start: 2019-09-04 | End: 2019-09-04 | Stop reason: SURG

## 2019-09-04 RX ORDER — NALOXONE HCL 0.4 MG/ML
0.4 VIAL (ML) INJECTION AS NEEDED
Status: DISCONTINUED | OUTPATIENT
Start: 2019-09-04 | End: 2019-09-04 | Stop reason: HOSPADM

## 2019-09-04 RX ADMIN — OXYCODONE HYDROCHLORIDE AND ACETAMINOPHEN 2 TABLET: 7.5; 325 TABLET ORAL at 13:02

## 2019-09-04 RX ADMIN — SODIUM CHLORIDE, POTASSIUM CHLORIDE, SODIUM LACTATE AND CALCIUM CHLORIDE 1000 ML: 600; 310; 30; 20 INJECTION, SOLUTION INTRAVENOUS at 09:04

## 2019-09-04 RX ADMIN — ONDANSETRON HYDROCHLORIDE 4 MG: 2 SOLUTION INTRAMUSCULAR; INTRAVENOUS at 11:59

## 2019-09-04 RX ADMIN — FENTANYL CITRATE 25 MCG: 50 INJECTION, SOLUTION INTRAMUSCULAR; INTRAVENOUS at 11:42

## 2019-09-04 RX ADMIN — PROPOFOL 50 MG: 10 INJECTION, EMULSION INTRAVENOUS at 11:41

## 2019-09-04 RX ADMIN — FENTANYL CITRATE 25 MCG: 50 INJECTION, SOLUTION INTRAMUSCULAR; INTRAVENOUS at 11:59

## 2019-09-04 RX ADMIN — PROPOFOL 150 MG: 10 INJECTION, EMULSION INTRAVENOUS at 11:39

## 2019-09-04 RX ADMIN — FENTANYL CITRATE 25 MCG: 50 INJECTION, SOLUTION INTRAMUSCULAR; INTRAVENOUS at 11:46

## 2019-09-04 RX ADMIN — LIDOCAINE HYDROCHLORIDE 100 MG: 20 INJECTION, SOLUTION INFILTRATION; PERINEURAL at 11:39

## 2019-09-04 RX ADMIN — CEFAZOLIN 2 G: 1 INJECTION, POWDER, FOR SOLUTION INTRAVENOUS at 11:46

## 2019-09-04 RX ADMIN — PROPOFOL 100 MG: 10 INJECTION, EMULSION INTRAVENOUS at 11:43

## 2019-09-04 RX ADMIN — FENTANYL CITRATE 25 MCG: 50 INJECTION, SOLUTION INTRAMUSCULAR; INTRAVENOUS at 11:39

## 2019-09-04 NOTE — DISCHARGE INSTRUCTIONS

## 2019-09-04 NOTE — ANESTHESIA PROCEDURE NOTES
Airway  Urgency: elective    Date/Time: 9/4/2019 11:41 AM  Airway not difficult    General Information and Staff    Patient location during procedure: OR  CRNA: Natali Cano CRNA    Indications and Patient Condition  Indications for airway management: airway protection    Preoxygenated: yes  Mask difficulty assessment: 1 - vent by mask    Final Airway Details  Final airway type: supraglottic airway      Successful airway: I-gel  Size 5    Number of attempts at approach: 1

## 2019-09-04 NOTE — OP NOTE
Operative Summary    Christiano Ho  Date of Procedure: 9/4/2019    Pre-op Diagnosis:   Balanoposthitis [N47.6]    Post-op Diagnosis:     Post-Op Diagnosis Codes:     * Balanoposthitis [N47.6]    Procedure/CPT® Codes:  Circumcision    Procedure(s):  CIRCUMCISION    Surgeon(s):  Ian Terrell MD    Anesthesia: General    Staff:   Circulator: Janey Jimenez RN  Scrub Person: Luke Phillips; Dinh Andino; Sacha Cruz; Serena Arthur    Indications for procedure:  Phimosis and recurrent balanoposthitis    Findings:   Normal glans penis  No sginficant lesions    Procedure details:  Patient is taken to the operating room suite is given effective general.  After the usual prep and drape the penis is placed on slight stretch. An incision is made circumferentially after the infiltration of the local anesthetic around the outer portion of the prepuce were the coronal impression is noted. The 12 o'clock I did a dorsal slit incision to be able to reduce the foreskin. I did this in a way that I left about 5 mm of the inner portion of prepuce away from the coronal rim. At that point I excised the excess skin with a sharp scissor. After copious irrigation with normal saline hemostasis was achieved using monopolar electrocautery. The epithelial edges were approximated with a combination of interrupted and running 3-0 chromic suture. A sterile dressing using steri-strips and Tegaderm was applied at the end of the procedure. The patient was transferred to the recovery room in stable condition.     Estimated Blood Loss: <30 mL    Specimens:                Specimens     ID Source Type Tests Collected By Collected At Frozen?      A Foreskin Tissue · TISSUE PATHOLOGY EXAM   Ian Terrell MD 9/4/19 1203 No     Description: foreskin from circumcision            Drains:  None    Complications: none    Plan: Leave sterile dressing in place. See patient back in the office in two weeks.     Ian Terrell MD     Date: 9/4/2019   Time: 1:08 PM

## 2019-09-04 NOTE — ANESTHESIA PREPROCEDURE EVALUATION
Anesthesia Evaluation     Patient summary reviewed   NPO Solid Status: > 8 hours             Airway   Mallampati: II  TM distance: >3 FB  Neck ROM: full  Dental    (+) lower dentures and upper dentures    Pulmonary    (-) COPD, asthma, sleep apnea, not a smoker  Cardiovascular   Exercise tolerance: excellent (>7 METS)    ECG reviewed    (+) hypertension, CAD, CABG (1999), hyperlipidemia,   (-) pacemaker, past MI, angina, cardiac stents      Neuro/Psych  (-) seizures, TIA, CVA  GI/Hepatic/Renal/Endo    (+) obesity,   diabetes mellitus,   (-) GERD, liver disease, no renal disease    Musculoskeletal     Abdominal    Substance History      OB/GYN          Other                        Anesthesia Plan    ASA 3     general     intravenous induction   Anesthetic plan, all risks, benefits, and alternatives have been provided, discussed and informed consent has been obtained with: patient.

## 2019-09-04 NOTE — ANESTHESIA POSTPROCEDURE EVALUATION
Patient: Christiano Ho    Procedure Summary     Date:  09/04/19 Room / Location:   [facility] OR 03 /  [facility] OR    Anesthesia Start:  1134 Anesthesia Stop:  1239    Procedure:  CIRCUMCISION (N/A Penis) Diagnosis:       Balanoposthitis      (Balanoposthitis [N47.6])    Surgeon:  Ian Terrell MD Provider:  Natali Cano CRNA    Anesthesia Type:  general ASA Status:  3          Anesthesia Type: general  Last vitals  BP   135/77 (09/04/19 1400)   Temp   98.9 °F (37.2 °C) (09/04/19 1315)   Pulse   69 (09/04/19 1400)   Resp   16 (09/04/19 1400)     SpO2   95 % (09/04/19 1400)     Post Anesthesia Care and Evaluation    PONV Status: none  Comments: Patient d/c from PACU prior to anes eval based on Hakeem score.  Please see RN notes for details of d/c criteria.    Blood pressure 135/77, pulse 69, temperature 98.9 °F (37.2 °C), temperature source Temporal, resp. rate 16, SpO2 95 %.

## 2019-09-09 LAB
CYTO UR: NORMAL
LAB AP CASE REPORT: NORMAL
PATH REPORT.FINAL DX SPEC: NORMAL
PATH REPORT.GROSS SPEC: NORMAL

## 2019-09-16 ENCOUNTER — HOSPITAL ENCOUNTER (OUTPATIENT)
Dept: CT IMAGING | Age: 69
Discharge: HOME | End: 2019-09-16
Payer: MEDICARE

## 2019-09-16 DIAGNOSIS — M54.2 CERVICALGIA: ICD-10-CM

## 2019-09-16 PROCEDURE — 72125 CT NECK SPINE W/O DYE: CPT

## 2019-09-16 NOTE — PROGRESS NOTES
Mr. Ho is 68 y.o. male    Chief Complaint   Patient presents with   • Circumcision Problem     Circumcision 2 weeks ago with Dr Terrell.       History of Present Illness   Patient presents with concerns of swelling status post circumcision.  He denies any fevers, chills, nausea, vomiting, discharge from incision site.  Patient does report some light bleeding from incision site, however this has resolved.  He also reports mild to moderate pain and swelling on the penile shaft.  He denies any aggravating or alleviating factors.    The following portions of the patient's history were reviewed and updated as appropriate: allergies, current medications, past family history, past medical history, past social history, past surgical history and problem list.    Review of Systems   Constitutional: Negative.  Negative for chills and fever.   Gastrointestinal: Negative for abdominal distention, abdominal pain, blood in stool, nausea and vomiting.   Genitourinary: Positive for penile pain (Discomfort) and penile swelling. Negative for difficulty urinating, dysuria, flank pain, frequency, hematuria and urgency.   Psychiatric/Behavioral: Negative.  Negative for agitation and confusion.         Current Outpatient Medications:   •  atorvastatin (LIPITOR) 40 MG tablet, Take 40 mg by mouth Daily., Disp: , Rfl:   •  losartan-hydrochlorothiazide (HYZAAR) 100-25 MG per tablet, Take 1 tablet by mouth Daily., Disp: , Rfl:   •  meloxicam (MOBIC) 15 MG tablet, Take 15 mg by mouth Daily., Disp: , Rfl:   •  metFORMIN (GLUCOPHAGE) 500 MG tablet, Take 500 mg by mouth Daily With Breakfast., Disp: , Rfl:     Past Medical History:   Diagnosis Date   • Arthritis     KNEES   • Cataract    • Coronary artery disease    • Diabetes mellitus (CMS/HCC)    • Elevated cholesterol    • High cholesterol    • Hypertension    • PONV (postoperative nausea and vomiting)        Past Surgical History:   Procedure Laterality Date   • BACK SURGERY  1970    TIMES 3  "FROM INJURY /LUMBAR    • CARPAL TUNNEL RELEASE Bilateral    • CHOLECYSTECTOMY     • CIRCUMCISION N/A 9/4/2019    Procedure: CIRCUMCISION;  Surgeon: Ian Terrell MD;  Location: [facility] OR;  Service: Urology   • CORONARY ARTERY BYPASS GRAFT  1999   • KNEE ARTHROSCOPY Right    • NECK SURGERY  2019    BY VERN VALENTIN       Social History     Socioeconomic History   • Marital status:      Spouse name: Not on file   • Number of children: Not on file   • Years of education: Not on file   • Highest education level: Not on file   Tobacco Use   • Smoking status: Former Smoker     Last attempt to quit: 8/28/2004     Years since quitting: 15.0   • Smokeless tobacco: Never Used   Substance and Sexual Activity   • Alcohol use: No     Frequency: Never   • Drug use: No   • Sexual activity: Defer       Family History   Problem Relation Age of Onset   • No Known Problems Father    • No Known Problems Mother        Objective    /86   Pulse 76   Resp 18   Ht 185.4 cm (73\")   Wt 111 kg (245 lb)   BMI 32.32 kg/m²     Physical Exam   Constitutional: He is oriented to person, place, and time. He appears well-developed and well-nourished.   HENT:   Head: Normocephalic.   Pulmonary/Chest: Effort normal. No respiratory distress.   Abdominal: He exhibits no distension.   Genitourinary: Right testis shows no swelling and no tenderness. Left testis shows no swelling and no tenderness. Circumcised. Penile tenderness present. No penile erythema. No discharge found.   Genitourinary Comments: Circumcision incision inspected for drainage.  No drainage or bleeding noted.  Mild to moderately enlarged area of induration on posterior aspect of penile shaft.   Musculoskeletal: He exhibits no edema.   Neurological: He is alert and oriented to person, place, and time.   Skin: Skin is warm and dry.   Psychiatric: He has a normal mood and affect. His behavior is normal.   Vitals reviewed.    Patient's Body mass index is 32.32 kg/m². BMI is " above normal parameters. Recommendations include: educational material.      Admission on 09/04/2019, Discharged on 09/04/2019   Component Date Value Ref Range Status   • Glucose 09/04/2019 124  70 - 130 mg/dL Final    : [id number] Gustavo Del ValleMeter ID: [id number]   • Case Report 09/04/2019    Final                    Value:Surgical Pathology Report                         Case: [id number]                                  Authorizing Provider:  Ian Terrell MD       Collected:           09/04/2019 12:03 PM          Ordering Location:     [facility] OR  Received:            09/04/2019 02:15 PM          Pathologist:           Fozia Rowe MD                                                        Specimen:    Foreskin, foreskin from circumcision                                                      • Final Diagnosis 09/04/2019    Final                    Value:This result contains rich text formatting which cannot be displayed here.   • Gross Description 09/04/2019    Final                    Value:This result contains rich text formatting which cannot be displayed here.   • Microscopic Description 09/04/2019    Final                    Value:This result contains rich text formatting which cannot be displayed here.   • Glucose 09/04/2019 119  70 - 130 mg/dL Final    : [id number] Ruchi CuevasMeter ID: [id number]       Results for orders placed or performed in visit on 09/17/19   POC Urinalysis Dipstick, Multipro   Result Value Ref Range    Color Yellow Yellow, Straw, Dark Yellow, Lindsay    Clarity, UA Clear Clear    Glucose, UA Negative Negative, 1000 mg/dL (3+) mg/dL    Bilirubin Negative Negative    Ketones, UA Negative Negative    Specific Gravity  1.020 1.005 - 1.030    Blood, UA Trace (A) Negative    pH, Urine 6.0 5.0 - 8.0    Protein, POC Negative Negative mg/dL    Urobilinogen, UA Normal Normal    Nitrite, UA Negative Negative    Leukocytes Negative Negative        Assessment/Plan    Assessment and Plan    Christiano was seen today for circumcision problem.    Diagnoses and all orders for this visit:    Phimosis  -     POC Urinalysis Dipstick, Multipro      Patient presents for follow-up after circumcision.  There is an area of induration on the posterior aspect of the penile shaft which appears to be a hematoma.  Patient denies any signs or symptoms of infection.  Penis does have somewhat of a buried appearance at this point.  He will follow-up with Dr. Terrell as scheduled in October.

## 2019-09-17 ENCOUNTER — OFFICE VISIT (OUTPATIENT)
Dept: UROLOGY | Facility: CLINIC | Age: 69
End: 2019-09-17

## 2019-09-17 VITALS
BODY MASS INDEX: 32.47 KG/M2 | HEART RATE: 76 BPM | SYSTOLIC BLOOD PRESSURE: 140 MMHG | DIASTOLIC BLOOD PRESSURE: 86 MMHG | RESPIRATION RATE: 18 BRPM | HEIGHT: 73 IN | WEIGHT: 245 LBS

## 2019-09-17 DIAGNOSIS — N47.1 PHIMOSIS: Primary | ICD-10-CM

## 2019-09-17 LAB
BILIRUB BLD-MCNC: NEGATIVE MG/DL
CLARITY, POC: CLEAR
COLOR UR: YELLOW
GLUCOSE UR STRIP-MCNC: NEGATIVE MG/DL
KETONES UR QL: NEGATIVE
LEUKOCYTE EST, POC: NEGATIVE
NITRITE UR-MCNC: NEGATIVE MG/ML
PH UR: 6 [PH] (ref 5–8)
PROT UR STRIP-MCNC: NEGATIVE MG/DL
RBC # UR STRIP: ABNORMAL /UL
SP GR UR: 1.02 (ref 1–1.03)
UROBILINOGEN UR QL: NORMAL

## 2019-09-17 PROCEDURE — 81001 URINALYSIS AUTO W/SCOPE: CPT | Performed by: NURSE PRACTITIONER

## 2019-09-17 PROCEDURE — 99024 POSTOP FOLLOW-UP VISIT: CPT | Performed by: NURSE PRACTITIONER

## 2019-09-17 NOTE — PATIENT INSTRUCTIONS

## 2019-10-02 ENCOUNTER — TELEPHONE (OUTPATIENT)
Dept: UROLOGY | Facility: CLINIC | Age: 69
End: 2019-10-02

## 2019-10-03 ENCOUNTER — OFFICE VISIT (OUTPATIENT)
Dept: UROLOGY | Facility: CLINIC | Age: 69
End: 2019-10-03

## 2019-10-03 VITALS — WEIGHT: 245 LBS | HEIGHT: 73 IN | TEMPERATURE: 98 F | BODY MASS INDEX: 32.47 KG/M2

## 2019-10-03 DIAGNOSIS — N47.1 PHIMOSIS: Primary | ICD-10-CM

## 2019-10-03 LAB
BILIRUB BLD-MCNC: NEGATIVE MG/DL
CLARITY, POC: CLEAR
COLOR UR: YELLOW
GLUCOSE UR STRIP-MCNC: NEGATIVE MG/DL
KETONES UR QL: NEGATIVE
LEUKOCYTE EST, POC: NEGATIVE
NITRITE UR-MCNC: NEGATIVE MG/ML
PH UR: 7 [PH] (ref 5–8)
PROT UR STRIP-MCNC: NEGATIVE MG/DL
RBC # UR STRIP: NEGATIVE /UL
SP GR UR: 1.02 (ref 1–1.03)
UROBILINOGEN UR QL: NORMAL

## 2019-10-03 PROCEDURE — 99024 POSTOP FOLLOW-UP VISIT: CPT | Performed by: UROLOGY

## 2019-10-03 PROCEDURE — 81003 URINALYSIS AUTO W/O SCOPE: CPT | Performed by: UROLOGY

## 2019-11-25 ENCOUNTER — OFFICE VISIT (OUTPATIENT)
Dept: UROLOGY | Facility: CLINIC | Age: 69
End: 2019-11-25

## 2019-11-25 VITALS — WEIGHT: 250 LBS | BODY MASS INDEX: 33.86 KG/M2 | HEIGHT: 72 IN | TEMPERATURE: 98.1 F

## 2019-11-25 DIAGNOSIS — N47.1 PHIMOSIS: Primary | ICD-10-CM

## 2019-11-25 LAB
BILIRUB BLD-MCNC: ABNORMAL MG/DL
CLARITY, POC: CLEAR
COLOR UR: YELLOW
GLUCOSE UR STRIP-MCNC: NEGATIVE MG/DL
KETONES UR QL: ABNORMAL
LEUKOCYTE EST, POC: NEGATIVE
NITRITE UR-MCNC: NEGATIVE MG/ML
PH UR: 5.5 [PH] (ref 5–8)
PROT UR STRIP-MCNC: ABNORMAL MG/DL
RBC # UR STRIP: NEGATIVE /UL
SP GR UR: 1.02 (ref 1–1.03)
UROBILINOGEN UR QL: NORMAL

## 2019-11-25 PROCEDURE — 99024 POSTOP FOLLOW-UP VISIT: CPT | Performed by: UROLOGY

## 2019-11-25 PROCEDURE — 81001 URINALYSIS AUTO W/SCOPE: CPT | Performed by: UROLOGY

## 2019-11-25 NOTE — PROGRESS NOTES
Mr. Ho is 69 y.o. male    CHIEF COMPLAINT: I am here for my 6 weeks follow up for phimosis.     HPI  Post op circ  Edema improved.      09/2019: Circumcision  Final Diagnosis   Foreskin, circumcision:  A.  Marked chronic active inflammation with a predominance of plasma cells and ulceration.  B.  No dysplasia identified.  C.  No histologic evidence of malignancy.  D.  GMS stain is negative for fungal organisms.   Electronically signed by Fozia Rowe MD on 9/9/2019 at 1632          The following portions of the patient's history were reviewed and updated as appropriate: allergies, current medications, past family history, past medical history, past social history, past surgical history and problem list.      Review of Systems   Constitutional: Negative for chills and fever.   Gastrointestinal: Negative for abdominal pain, anal bleeding and blood in stool.   Genitourinary: Negative for dysuria, frequency, hematuria and urgency.         Current Outpatient Medications:   •  atorvastatin (LIPITOR) 40 MG tablet, Take 40 mg by mouth Daily., Disp: , Rfl:   •  losartan-hydrochlorothiazide (HYZAAR) 100-25 MG per tablet, Take 1 tablet by mouth Daily., Disp: , Rfl:   •  meloxicam (MOBIC) 15 MG tablet, Take 15 mg by mouth Daily., Disp: , Rfl:   •  metFORMIN (GLUCOPHAGE) 500 MG tablet, Take 500 mg by mouth Daily With Breakfast., Disp: , Rfl:     Past Medical History:   Diagnosis Date   • Arthritis     KNEES   • Cataract    • Coronary artery disease    • Diabetes mellitus (CMS/HCC)    • Elevated cholesterol    • High cholesterol    • Hypertension    • PONV (postoperative nausea and vomiting)        Past Surgical History:   Procedure Laterality Date   • BACK SURGERY  1970    TIMES 3 FROM INJURY /LUMBAR    • CARPAL TUNNEL RELEASE Bilateral    • CHOLECYSTECTOMY     • CIRCUMCISION N/A 9/4/2019    Procedure: CIRCUMCISION;  Surgeon: Ian Terrell MD;  Location: [facility] OR;  Service: Urology   • CORONARY ARTERY BYPASS  "GRAFT  1999   • KNEE ARTHROSCOPY Right    • NECK SURGERY  2019    BY VERN VALENTIN       Social History     Socioeconomic History   • Marital status:      Spouse name: Not on file   • Number of children: Not on file   • Years of education: Not on file   • Highest education level: Not on file   Tobacco Use   • Smoking status: Former Smoker     Last attempt to quit: 8/28/2004     Years since quitting: 15.2   • Smokeless tobacco: Never Used   Substance and Sexual Activity   • Alcohol use: No     Frequency: Never   • Drug use: No   • Sexual activity: Defer       Family History   Problem Relation Age of Onset   • No Known Problems Father    • No Known Problems Mother          There were no vitals taken for this visit.      Physical Exam  Significant improvement.  No glandular erythema.  Does still have some induration on the ventral aspect of the distal shaft.  It is not fluctuant.  There is no erythema of the skin overlying the \"nodule\".    Data  Results for orders placed or performed in visit on 10/03/19   POC Urinalysis Dipstick, Multipro   Result Value Ref Range    Color Yellow Yellow, Straw, Dark Yellow, Lindsay    Clarity, UA Clear Clear    Glucose, UA Negative Negative, 1000 mg/dL (3+) mg/dL    Bilirubin Negative Negative    Ketones, UA Negative Negative    Specific Gravity  1.020 1.005 - 1.030    Blood, UA Negative Negative    pH, Urine 7.0 5.0 - 8.0    Protein, POC Negative Negative mg/dL    Urobilinogen, UA Normal Normal    Nitrite, UA Negative Negative    Leukocytes Negative Negative         Imaging Results (Last 7 Days)     ** No results found for the last 168 hours. **        International Prostate Symptom Score  The following is posted based on patient questionnaire answers:  0 - not at all    1-7 mild symptoms  1- Less than one time in five  8-19 moderate symptoms  2 -Less than half the time  20-35 severe symptoms  3 - About half the time  4 - More than half the time  5 - Almost always     For following " sections:  Incomplete Emptying: - How often have you had the sensation  of not emptying your bladder completely after you finished urinating?  0  Frequency: -How often have you had to urinate again less than   two hours after you finished urinating?      1  Intermittency: -How often have you found you stopped and started again  Several times when you urinate?       0  Urgency: -How often do you find it difficult to postpone urination?             1  Weak stream: - How often have you had a weak urinary stream?             0  Straining: - How often have you had to push or strain to begin  Urination?          0  Sleeping: -How many times did you most typically get up to urinate   From the time you went to bed at night until the time you got up in the   1  Morning          Total `  3    Quality of Life  How would you feel if you had to live with your urinary condition the way   1  It is now, no better, no worse for the rest of your life?    Where: 0=delighted; 1= pleased, 2= mostly satisfied, 3= mixed, 4 = mostly  Dissatisfied, 5= Unhappy, 6 = terrible    Patient's Body mass index is 33.91 kg/m². BMI is above normal parameters. Recommendations include: educational material.    Assessment and Plan  Diagnoses and all orders for this visit:    Phimosis  -     POC Urinalysis Dipstick, Multipro    Markedly improved.        F/U: 6 as needed      (Please note that portions of this note were completed with a voice recognition program.)  Bryan Barcenas  11/25/19  9:24 AM

## 2019-11-25 NOTE — PATIENT INSTRUCTIONS

## 2020-03-05 ENCOUNTER — TELEPHONE (OUTPATIENT)
Dept: CARDIOLOGY | Age: 70
End: 2020-03-05

## 2020-03-11 ENCOUNTER — OFFICE VISIT (OUTPATIENT)
Dept: CARDIOLOGY | Age: 70
End: 2020-03-11
Payer: MEDICARE

## 2020-03-11 VITALS
HEIGHT: 72 IN | WEIGHT: 247 LBS | HEART RATE: 70 BPM | BODY MASS INDEX: 33.46 KG/M2 | DIASTOLIC BLOOD PRESSURE: 64 MMHG | SYSTOLIC BLOOD PRESSURE: 122 MMHG

## 2020-03-11 PROCEDURE — 1123F ACP DISCUSS/DSCN MKR DOCD: CPT | Performed by: CLINICAL NURSE SPECIALIST

## 2020-03-11 PROCEDURE — 4040F PNEUMOC VAC/ADMIN/RCVD: CPT | Performed by: CLINICAL NURSE SPECIALIST

## 2020-03-11 PROCEDURE — 99203 OFFICE O/P NEW LOW 30 MIN: CPT | Performed by: CLINICAL NURSE SPECIALIST

## 2020-03-11 PROCEDURE — 3017F COLORECTAL CA SCREEN DOC REV: CPT | Performed by: CLINICAL NURSE SPECIALIST

## 2020-03-11 PROCEDURE — 1036F TOBACCO NON-USER: CPT | Performed by: CLINICAL NURSE SPECIALIST

## 2020-03-11 PROCEDURE — G8484 FLU IMMUNIZE NO ADMIN: HCPCS | Performed by: CLINICAL NURSE SPECIALIST

## 2020-03-11 PROCEDURE — 93000 ELECTROCARDIOGRAM COMPLETE: CPT | Performed by: CLINICAL NURSE SPECIALIST

## 2020-03-11 PROCEDURE — G8427 DOCREV CUR MEDS BY ELIG CLIN: HCPCS | Performed by: CLINICAL NURSE SPECIALIST

## 2020-03-11 PROCEDURE — G8417 CALC BMI ABV UP PARAM F/U: HCPCS | Performed by: CLINICAL NURSE SPECIALIST

## 2020-03-11 RX ORDER — CELECOXIB 200 MG/1
200 CAPSULE ORAL 2 TIMES DAILY
Status: ON HOLD | COMMUNITY
End: 2020-07-22 | Stop reason: HOSPADM

## 2020-03-11 NOTE — PATIENT INSTRUCTIONS
Behzad Da Silva soon   If that is OK will send letter to Dr. Conchis Burkett and results to Dr Paula Ambrosio   Follow up in 1 year   Call with any questions or concerns  Follow up with PCP for non cardiac problems and labs   Report any new problems  Cardiovascular Fitness-Exercise as tolerated. Strive for 30 minutes of exercise most days of the week. Cardiac / Healthy Diet  Continue current medications as directed  Continue plan of treatment      [place] at the [facility] and [facility] [address] [facility] located on the first floor of [place] [address] through hospital main entrance and turn immediately to your left. Patient's contact number:  [phone] (home)      Lexiscan Stress Test      Lexiscan (regadenoson injection) is a prescription drug given through an IV line that increases blood flow through the arteries of the heart during a cardiac nuclear stress test.     There are two parts to a Lexiscan stress test: the rest portion and the exercise portion. For the rest portion, a radioactive tracer is injected into your arm through the IV. After 30 to 60 minutes, the process of imaging will begin. A nuclear camera will be placed on your chest area and images are taken for the next 15 to 20 minutes. For the exercise portion, a nurse will attach EKG electrodes to your chest to monitor your heart rate. The drug Behzad Harpers is administered to simulate stress on the heart. Your heart rhythm will then be monitored for the next few minutes. Your blood pressure will also be monitored throughout the exercise portion. [place] through the exercise portion, a second round of radioactive tracer is injected into your body. Your heart rate and EKG will be monitored for another few minutes after administering the drug. Test Preparation:     Bring a list of your current medications.   Do not take any of your medications the morning of the test, but bring all morning medications with you as you will take them after the stress portion of the test is completed.  Do not eat Bananas 24 hours prior to test.     No caffeine 24 hours prior to the testing. This includes: coffee, pop/soda, chocolate, cold medications, etc.  Any product that might contain caffeine.  No nicotine or alcohol 12 hours prior to your test.    Nothing to eat or drink 6-8 hours prior to appointment time. It is okay to drink small amounts of water during the four hours prior to the test.   Nitroglycerin patches must be taken off 1 hour before testing.  Wear comfortable clothing.  Please refrain from any strenuous exercise or activities the day before your test, or the day of your test.   The Nuclear Lexiscan Stress test takes about 2 ½ to 3 hours to complete. If for any reason you are unable to keep this appointment, please contact Outpatient Scheduling, [phone], as soon as possible to reschedule.

## 2020-03-11 NOTE — PROGRESS NOTES
[facility] [place], [address]  [id number]  Phone: [phone]  Fax: [phone]    OFFICE VISIT:  3/11/2020    Arnoldo Black - : 1950    Dear Dr. Davonte Coe,     I appreciate the opportunity of participating in the care of Arnoldo Black. He is a very pleasant 71 y.o. male who I had the opportunity of seeing in my office today, 3/11/20. Records from your office have been obtained and reviewed. Reason For Visit:  Andreina Ron is a 71 y.o. male who is here for New Patient (no cardiac symptoms); Cardiac Clearance (knee surgery  Dr Davonte Coe); and Coronary Artery Disease  Is here for cardiac clearance for upcoming surgery with Dr. Davonte Coe. History of hypertension, hyperlipidemia and diabetes controlled on oral agent  Had negative stress test in 2017. 2D echo and 2018 showed normal LV size and function with an EF 50%. Diastolic dysfunction noted. Mild LVH with no significant valve disease    Previously followed with DR Sriram Cardona  In [place]. Follows with his primary doctor regularly. No significant change in activity tolerance with the exception of his knee pain. Burak Morris has no exertional chest pain, pressure, burning or squeezing. He is able to lie flat without evidence of orthopnea or paroxysmal nocturnal dyspnea. No symptomatic tachy- or portia-arrhythmia. No numbness or weakness to suggest cerebrovascular accident or transient ischemic attack. Reports no edema.      Arnoldo Black has the following history as recorded in [facility]:  Patient Active Problem List    Diagnosis Date Noted    CAD (coronary artery disease)     Degeneration of intervertebral disc at C4-C5 level 2019    Cervical myelopathy ([address] 2019     Past Medical History:   Diagnosis Date    CAD (coronary artery disease)     Cervical myelopathy ([address] 2019    Degeneration of intervertebral disc at C4-C5 level 2019    Diabetes mellitus ([address]     Hyperlipidemia     Hypertension      Past Surgical History:   Procedure Laterality Date    BACK SURGERY      CARDIAC SURGERY  1999    CABG X 4V    CERVICAL FUSION N/A 2019    C3-4 C4-5 ACDF performed by Jarred De La Garza MD at [address] [place] [address] Right     scope, age 35ish      Family History   Problem Relation Age of Onset    Heart Disease Mother     Diabetes Mother     Cancer Father         LUNG CA     Social History     Tobacco Use    Smoking status: Former Smoker     Types: Cigarettes     Last attempt to quit: 1995     Years since quittin.9    Smokeless tobacco: Never Used   Substance Use Topics    Alcohol use: No        Allergies: Patient has no known allergies. Current Outpatient Medications   Medication Sig Dispense Refill    aspirin 81 MG tablet Take 81 mg by mouth daily      Meloxicam (MOBIC PO) Take by mouth      celecoxib (CELEBREX) 200 MG capsule Take 200 mg by mouth 2 times daily      metFORMIN (GLUCOPHAGE) 500 MG tablet Take 500 mg by mouth daily (with breakfast)      atorvastatin (LIPITOR) 40 MG tablet Take 40 mg by mouth daily      losartan-hydrochlorothiazide (HYZAAR) 100-25 MG per tablet Take 1 tablet by mouth daily       No current facility-administered medications for this visit. Review of Systems  Constitutional - no significant activity change, appetite change, or unexpected weight change. No fever, chills or diaphoresis. No fatigue. HEENT - no significant rhinorrhea or epistaxis. No tinnitus or significant hearing loss. Eyes - no sudden vision change or amaurosis. Respiratory - no significant wheezing, stridor, apnea or cough. No dyspnea on exertion or shortness of breath. Cardiovascular - no exertional chest pain, orthopnea or PND. No sensation of arrhythmia or slow heart rate. No claudication or leg edema. Gastrointestinal - no abdominal swelling or pain. No blood in stool.  No severe constipation, diarrhea, nausea, or vomiting. Genitourinary - no difficulty urinating, dysuria, frequency, or urgency. No flank pain or hematuria. no previous radiation or chemotherapy  Musculoskeletal - no back pain,  Knee pain with  gait disturbance,  no myalgia. Skin - no color change or rash. No pallor. No new surgical incision. Neurologic - no speech difficulty, facial asymmetry or lateralizing weakness. No seizures, presyncope, syncope, or significant dizziness. Hematologic - no easy bruising or excessive bleeding. Psychiatric - no severe anxiety or insomnia. No confusion. All other review of systems are negative. Objective  Vital Signs - /64   Pulse 70   Ht 6' (1.829 m)   Wt 247 lb (112 kg)   BMI 33.50 kg/m²   General - Thor Boggs is alert, cooperative, and pleasant. Well groomed. No acute distress. Body habitus is overweight. HEENT - The head is normocephalic. No circumoral cyanosis. Dentition is normal.   Ears and nose externally normal. No abnormal scars or lesions noted  EYES -  No Xanthelasma, no arcus senilis, no conjunctival hemorrhages or discharge. Neck - Supple, without increased jugular venous pressures. No carotid bruits. No mass. Respiratory - Lungs are clear bilaterally. No wheezes or rales. Normal effort without use of accessory muscles. No tactile fremitus on palpation  Cardiovascular - Heart has regular rhythm and rate. No murmurs, rubs or gallops. + pedal pulses and no varicosities. Abdominal -  Soft, nontender, nondistended. Bowel sounds are intact. Extremities - No clubbing, cyanosis, or  edema. Musculoskeletal - No musculoskeletal symptoms. No clubbing . No Osler's nodes. Gait normal .  No kyphosis or scoliosis. Skin -  no statis ulcers or dermatitis. Neurological - No focal signs are identified. Oriented to person, place and time. Psychiatric -  Appropriate affect and mood. Assessment:          Diagnosis Orders   1.  Pre-op evaluation  EKG 12 lead    NM MYOCARDIAL SPECT REST EXERCISE OR RX   2. Essential hypertension  NM MYOCARDIAL SPECT REST EXERCISE OR RX   3. Mixed hyperlipidemia  NM MYOCARDIAL SPECT REST EXERCISE OR RX   4. Coronary artery disease involving native coronary artery of native heart without angina pectoris  NM MYOCARDIAL SPECT REST EXERCISE OR RX     EKG today shows sinus rhythm with a rate of 70. Possible old anterior infarct. Blood pressure and heart rate controlled. Medical management includes ARB/HCTZ, aspirin and statin. On metformin for his glucose  He is checking his BP at home and has been controlled  Requesting preoperative evaluation prior to upcoming orthopedic surgery. History of coronary disease with CABG in 1999  Would be unable to walk treadmill due to knee pain. We will get him set up for nuclear stress test to evaluate for any myocardial ischemia. If that is negative we will send a letter of risk stratification to Dr. Luis Manuel Thomas. Also will send results to his primary care doctor      [address] soon   If that is OK will send letter to Dr. Luis Manuel Thomas and results to Dr Marleny Smith   Follow up in 1 year   Call with any questions or concerns  Follow up with PCP for non cardiac problems and labs   Report any new problems  Cardiovascular Fitness-Exercise as tolerated. Strive for 30 minutes of exercise most days of the week. Cardiac / Healthy Diet  Continue current medications as directed  Continue plan of treatment        I appreciate the opportunity of participating in the care and treatment of this patient. MARIETTA Beasley dragon/transcription disclaimer: Much of this encounter note is electronic transcription/translation of spoken language to printed tach. Electronic translation of spoken language may be erroneous, or at times, nonsensical words or phrases may be inadvertently transcribed.  Although, I have reviewed the note for such errors, some may still exist.      Cc:  Epi Dao MD

## 2020-03-13 ENCOUNTER — HOSPITAL ENCOUNTER (OUTPATIENT)
Dept: NUCLEAR MEDICINE | Age: 70
Discharge: HOME | End: 2020-03-15
Payer: MEDICARE

## 2020-03-13 PROCEDURE — 93017 CV STRESS TEST TRACING ONLY: CPT

## 2020-03-13 PROCEDURE — 6360000002 HC RX W HCPCS: Performed by: INTERNAL MEDICINE

## 2020-03-13 PROCEDURE — A9500 TC99M SESTAMIBI: HCPCS | Performed by: CLINICAL NURSE SPECIALIST

## 2020-03-13 PROCEDURE — 3430000000 HC RX DIAGNOSTIC RADIOPHARMACEUTICAL: Performed by: CLINICAL NURSE SPECIALIST

## 2020-03-13 RX ADMIN — TETRAKIS(2-METHOXYISOBUTYLISOCYANIDE)COPPER(I) TETRAFLUOROBORATE 30 MILLICURIE: 1 INJECTION, POWDER, LYOPHILIZED, FOR SOLUTION INTRAVENOUS at 13:56

## 2020-03-13 RX ADMIN — REGADENOSON 0.4 MG: 0.08 INJECTION, SOLUTION INTRAVENOUS at 11:37

## 2020-03-13 RX ADMIN — TETRAKIS(2-METHOXYISOBUTYLISOCYANIDE)COPPER(I) TETRAFLUOROBORATE 10 MILLICURIE: 1 INJECTION, POWDER, LYOPHILIZED, FOR SOLUTION INTRAVENOUS at 13:56

## 2020-03-17 ENCOUNTER — TELEPHONE (OUTPATIENT)
Dept: CARDIOLOGY | Age: 70
End: 2020-03-17

## 2020-03-17 LAB
LV EF: 52 %
LVEF MODALITY: NORMAL

## 2020-03-17 NOTE — TELEPHONE ENCOUNTER
Per Dr. Jimenez Roles patient needs cath before surgery. Since this has to be approved by someone know to be scheduled I am not sure when this can be scheduled. I will give to [place] when she returns.

## 2020-03-26 ENCOUNTER — OFFICE VISIT (OUTPATIENT)
Dept: CARDIOLOGY | Age: 70
End: 2020-03-26
Payer: MEDICARE

## 2020-03-26 VITALS
BODY MASS INDEX: 33.32 KG/M2 | WEIGHT: 246 LBS | HEIGHT: 72 IN | DIASTOLIC BLOOD PRESSURE: 84 MMHG | HEART RATE: 94 BPM | SYSTOLIC BLOOD PRESSURE: 124 MMHG

## 2020-03-26 PROCEDURE — 3017F COLORECTAL CA SCREEN DOC REV: CPT | Performed by: CLINICAL NURSE SPECIALIST

## 2020-03-26 PROCEDURE — 1036F TOBACCO NON-USER: CPT | Performed by: CLINICAL NURSE SPECIALIST

## 2020-03-26 PROCEDURE — 4040F PNEUMOC VAC/ADMIN/RCVD: CPT | Performed by: CLINICAL NURSE SPECIALIST

## 2020-03-26 PROCEDURE — 1123F ACP DISCUSS/DSCN MKR DOCD: CPT | Performed by: CLINICAL NURSE SPECIALIST

## 2020-03-26 PROCEDURE — G8417 CALC BMI ABV UP PARAM F/U: HCPCS | Performed by: CLINICAL NURSE SPECIALIST

## 2020-03-26 PROCEDURE — 99214 OFFICE O/P EST MOD 30 MIN: CPT | Performed by: CLINICAL NURSE SPECIALIST

## 2020-03-26 PROCEDURE — G8427 DOCREV CUR MEDS BY ELIG CLIN: HCPCS | Performed by: CLINICAL NURSE SPECIALIST

## 2020-03-26 PROCEDURE — G8484 FLU IMMUNIZE NO ADMIN: HCPCS | Performed by: CLINICAL NURSE SPECIALIST

## 2020-03-26 NOTE — PATIENT INSTRUCTIONS
valves and chambers function   Check heart defects   Evaluate an enlarged heart   Decide on an appropriate treatment   Possible Complications   If you are planning to have a cardiac catheterization, your doctor will review a list of possible complications, which may include:   Bleeding at the point of the catheter insertion   Damage to arteries   Heart attack or arrhythmia (abnormal heart beats)   Allergic reaction to x-ray dye   Blood clot formation   Infection   Some factors that may increase the risk of complications include: Allergies to medicines or x-ray dye   Obesity   Smoking   Bleeding disorder   Age: 61 or older   Recent pneumonia   Recent heart attack   Diabetes   Kidney disease   What to Expect Prior to Procedure   Your doctor may order:   Blood and urine tests   Electrocardiogram (ECG, EKG)a test that records the heart's activity by measuring electrical currents through the heart muscle   Chest x-ray   Stress test   Talk to your doctor about your medicines. You may be asked to stop taking some medicines before the procedure, like:   Anti-inflammatory drugs (eg, ibuprofen )   Blood thinners, like or warfarin (Coumadin)   clopidogrel (Plavix)   Metformin (Glucophage) or glyburide and metformin (Glucovance)   Leading up to your procedure:   Arrange for a ride to and from the procedure. The night before, do not eat or drink anything after midnight. Anesthesia   Local anesthesia will be used at the insertion site. A mild sedative may be given one hour before or through IV during the procedure. This will help you relax. Description of the Procedure   During the procedure, you will receive IV fluids and medicines. An EKG will be monitoring your heart's activity. You will be awake but sedated so that you will be more relaxed. Your doctor will ask you to do basic functions such as coughing, breathing out, and holding your breath.  If you feel any chest pain, dizziness, nausea, tingling, or other discomfort, tell your doctor. The area of the groin or arm where the catheter will be inserted is shaved, cleaned, and numbed. A needle will be inserted into a blood vessel. A wire will be passed through the needle and into the blood vessel. The wire will then be guided through until it reaches your heart. A soft, flexible catheter tube will then be slipped over the wire and threaded up to your heart. The doctor will be taking x-ray pictures during the procedure to know where the wire and catheter are. Dye will be injected into the arteries of the heart. This will make the arteries and heart show up on the x-ray images. You may feel warm during the dye injection. Insertion of Catheter with Guide Wire    Once in place, the catheter can be used to take measurements. Blood pressure can be taken within the heart's different chambers. Blood samples may also be taken. Multiple x-ray images will be taken to look for any disease in the arteries. An aortogram may also be done at this time. This step will give a clear image of the aorta (large artery leaving the heart). Once all the tests and images are complete, the catheter will be removed. Sometimes, the doctor will perform balloon angioplasty and stenting if he finds an area in your arteries that is narrow or clogged. These are procedures that help to open narrowed arteries. Finally, a bandage will be placed over the groin or arm area. How Long Will It Take? The procedure takes about 1-2 hours. Preparation before the test will take another 1-2 hours. How Much Will It Hurt? Although the procedure is generally not painful, it can cause some discomfort, including:   Burning sensation (when skin at catheter insertion site is anesthetized)   Pressure when catheter is inserted or replaced with other catheters   A flushing feeling or nausea when the dye is injected   Headache   Heart palpitations   Pain medicine will be given when needed.      [facility]

## 2020-03-26 NOTE — PROGRESS NOTES
[facility]  [facility] [address]  [address]  Phone: [phone]  Fax: [phone]    OFFICE VISIT:  3/26/2020    Anel Muñoz - : 1950    Reason For Visit:  Vivian Bhat is a 71 y.o. male who is here for 1 Year Follow Up (patient has fatigue); Hypertension; and Coronary Artery Disease  CABG x4 in   Had negative stress test in 2017. 2D echo and 2018 showed normal LV size and function with an EF 50%. Diastolic dysfunction noted. Mild LVH with no significant valve disease  Previously followed with DR Sixto Adames  [facility]. Follows with his primary doctor regularly  Patient establish care with this office earlier this month and preoperative evaluation with knee replacement with Dr. Randal Levine. He underwent nuclear stress testing  Stress test showed possible attenuation versus inferior infarct. Estimated ejection fraction 52%. Reviewed imaging with Dr. Kelton Robles. Suggested heart catheterization. However with current pandemic elective procedures are being postponed till least May. He has a constant headaches- since his neck surgery  He has constant chest pressure that is ongoing. He continues to have GEORGES with activity and exertion. Activities declined due to knee pain and fatigue. Isra Collnis denies exertional chest pain, resting shortness of breath, orthopnea, paroxysmal nocturnal dyspnea, syncope, presyncope, arrhythmia, edema and fatigue. The patient denies numbness or weakness to suggest cerebrovascular accident or transient ischemic attack. Binh Brand MD is PCP and follows labs including .   Anel Muñoz has the following history as recorded in [facility]:    Patient Active Problem List    Diagnosis Date Noted    CAD (coronary artery disease)     Degeneration of intervertebral disc at C4-C5 level 2019    Cervical myelopathy ([facility] [address] 2019     Past Medical History:   Diagnosis Date    CAD (coronary artery disease)     Cervical myelopathy slow heart rate. No claudication or leg edema. Gastrointestinal - no abdominal swelling or pain. No blood in stool. No severe constipation, diarrhea, nausea, or vomiting. Genitourinary - no difficulty urinating, dysuria, frequency, or urgency. No flank pain or hematuria. Musculoskeletal - no back pain,  Knee pain with mild gait disturbance  No myalgia. Skin - no color change or rash. No pallor. No new surgical incision. Neurologic - no speech difficulty, facial asymmetry or lateralizing weakness. No seizures, presyncope, syncope, or significant dizziness. Hematologic - no easy bruising or excessive bleeding. Psychiatric - no severe anxiety or insomnia. No confusion. All other review of systems are negative. Objective  Vital Signs - /84   Pulse 94   Ht 6' (1.829 m)   Wt 246 lb (111.6 kg)   BMI 33.36 kg/m²   General - Prudy Modesta is alert, cooperative, and pleasant. Well groomed. No acute distress. Body habitus is overweight. HEENT - The head is normocephalic. No circumoral cyanosis. Dentition is normal.   EYES -  No Xanthelasma, no arcus senilis, no conjunctival hemorrhages or discharge. Neck - Supple, without increased jugular venous pressures. No carotid bruits. No mass. Respiratory - Lungs are clear bilaterally. No wheezes or rales. Normal effort without use of accessory muscles. Cardiovascular - Heart has regular rhythm and rate. No murmurs, rubs or gallops. + pedal pulses and no varicosities. Abdominal -  Soft, nontender, nondistended. Bowel sounds are intact. Extremities - No clubbing, cyanosis, or  edema. Musculoskeletal -  No clubbing . No Osler's nodes. Gait - limp. No kyphosis or scoliosis. Skin -  no statis ulcers or dermatitis. Neurological - No focal signs are identified. Oriented to person, place and time. Psychiatric -  Appropriate affect and mood. Assessment:     Diagnosis Orders   1.  Coronary artery disease involving native

## 2020-04-28 ENCOUNTER — TELEPHONE (OUTPATIENT)
Dept: CARDIOLOGY | Age: 70
End: 2020-04-28

## 2020-05-21 ENCOUNTER — TELEPHONE (OUTPATIENT)
Dept: CARDIOLOGY | Age: 70
End: 2020-05-21

## 2020-05-21 NOTE — TELEPHONE ENCOUNTER
Date: 6/24/20    Cardiologist: Baylee Alicea    Procedure: right total knee    Surgeon: ulisses    Last Office Visit: 3/26/20  Reason for office visit and medical concerns addressed at this office visit: cad, htn, hyperlipidemia    Testing Performed and Date of Service:  3/13/20 Shalonda   There is possible diaphragmatic attenuation versus inferior infarct   with no ischemia, with a calculated ejection fraction of 52 %. Suggest: Clinical correlation and consideration for medical management         RCRI = 0.9%   METs 4    Current Medications: aspirin, meloxicam, celebrex, metofrmin, lipitor, losartan-hctz    Is the patient currently taking an anticoagulant? If so, what is the diagnosis the patient has been given to warrant the need for the anticoagulant?  no    Additional Notes: requesting cardiac clearance prior to procedure

## 2020-05-28 ENCOUNTER — OFFICE VISIT (OUTPATIENT)
Age: 70
End: 2020-05-28

## 2020-05-28 VITALS — OXYGEN SATURATION: 95 % | TEMPERATURE: 98 F

## 2020-05-28 NOTE — PATIENT INSTRUCTIONS
departments. getupp allows you to send messages to your doctor, view your test results, renew your prescriptions, schedule appointments, view visit notes, and more. How Do I Sign Up? 1. In your Internet browser, go to [email or web address]  2. Click on the Sign Up Now link in the Sign In box. You will see the New Member Sign Up page. 3. Enter your getupp Access Code exactly as it appears below. You will not need to use this code after youve completed the sign-up process. If you do not sign up before the expiration date, you must request a new code. [employer] Access Code: Activation code not generated  Current getupp Status: Active    4. Enter your Social Security Number ([id number]) and Date of Birth (mm/dd/yyyy) as indicated and click Submit. You will be taken to the next sign-up page. 5. Create a getupp ID. This will be your getupp login ID and cannot be changed, so think of one that is secure and easy to remember. 6. Create a getupp password. You can change your password at any time. 7. Enter your Password Reset Question and Answer. This can be used at a later time if you forget your password. 8. Enter your e-mail address. You will receive e-mail notification when new information is available in [address] 9. Click Sign Up. You can now view your medical record. Additional Information  If you have questions, please contact the physician practice where you receive care. Remember, getupp is NOT to be used for urgent needs. For medical emergencies, dial 911. For questions regarding your getupp account call [phone]. If you have a clinical question, please call your doctor's office.

## 2020-05-30 LAB
REPORT: NORMAL
SARS-COV-2: NOT DETECTED
THIS TEST SENT TO: NORMAL

## 2020-06-01 ENCOUNTER — APPOINTMENT (OUTPATIENT)
Dept: GENERAL RADIOLOGY | Age: 70
End: 2020-06-01
Attending: INTERNAL MEDICINE
Payer: MEDICARE

## 2020-06-01 ENCOUNTER — HOSPITAL ENCOUNTER (OUTPATIENT)
Dept: CARDIAC CATH/INVASIVE PROCEDURES | Age: 70
Setting detail: OBSERVATION
Discharge: HOME | End: 2020-06-02
Attending: INTERNAL MEDICINE | Admitting: INTERNAL MEDICINE
Payer: MEDICARE

## 2020-06-01 LAB
ANION GAP SERPL CALCULATED.3IONS-SCNC: 11 MMOL/L (ref 7–19)
BUN BLDV-MCNC: 16 MG/DL (ref 8–23)
CALCIUM SERPL-MCNC: 9.6 MG/DL (ref 8.8–10.2)
CHLORIDE BLD-SCNC: 104 MMOL/L (ref 98–111)
CO2: 24 MMOL/L (ref 22–29)
CREAT SERPL-MCNC: 0.7 MG/DL (ref 0.5–1.2)
EKG P AXIS: 14 DEGREES
EKG P-R INTERVAL: 168 MS
EKG Q-T INTERVAL: 418 MS
EKG QRS DURATION: 100 MS
EKG QTC CALCULATION (BAZETT): 418 MS
EKG T AXIS: 13 DEGREES
GFR NON-AFRICAN AMERICAN: >60
GLUCOSE BLD-MCNC: 117 MG/DL (ref 74–109)
HCT VFR BLD CALC: 42.6 % (ref 42–52)
HEMOGLOBIN: 14.5 G/DL (ref 14–18)
MCH RBC QN AUTO: 29.5 PG (ref 27–31)
MCHC RBC AUTO-ENTMCNC: 34 G/DL (ref 33–37)
MCV RBC AUTO: 86.6 FL (ref 80–94)
PDW BLD-RTO: 13.4 % (ref 11.5–14.5)
PLATELET # BLD: 159 K/UL (ref 130–400)
PMV BLD AUTO: 12.4 FL (ref 9.4–12.4)
POTASSIUM SERPL-SCNC: 3.8 MMOL/L (ref 3.5–5)
RBC # BLD: 4.92 M/UL (ref 4.7–6.1)
SODIUM BLD-SCNC: 139 MMOL/L (ref 136–145)
WBC # BLD: 5.2 K/UL (ref 4.8–10.8)

## 2020-06-01 PROCEDURE — 93459 L HRT ART/GRFT ANGIO: CPT | Performed by: INTERNAL MEDICINE

## 2020-06-01 PROCEDURE — 6360000002 HC RX W HCPCS

## 2020-06-01 PROCEDURE — 6360000004 HC RX CONTRAST MEDICATION: Performed by: INTERNAL MEDICINE

## 2020-06-01 PROCEDURE — C1887 CATHETER, GUIDING: HCPCS

## 2020-06-01 PROCEDURE — C1769 GUIDE WIRE: HCPCS

## 2020-06-01 PROCEDURE — C1894 INTRO/SHEATH, NON-LASER: HCPCS

## 2020-06-01 PROCEDURE — 93010 ELECTROCARDIOGRAM REPORT: CPT | Performed by: INTERNAL MEDICINE

## 2020-06-01 PROCEDURE — C1760 CLOSURE DEV, VASC: HCPCS

## 2020-06-01 PROCEDURE — C1876 STENT, NON-COA/NON-COV W/DEL: HCPCS

## 2020-06-01 PROCEDURE — 92937 PRQ TRLUML REVSC CAB GRF 1: CPT | Performed by: INTERNAL MEDICINE

## 2020-06-01 PROCEDURE — 80048 BASIC METABOLIC PNL TOTAL CA: CPT

## 2020-06-01 PROCEDURE — 6370000000 HC RX 637 (ALT 250 FOR IP): Performed by: INTERNAL MEDICINE

## 2020-06-01 PROCEDURE — G0378 HOSPITAL OBSERVATION PER HR: HCPCS

## 2020-06-01 PROCEDURE — 36415 COLL VENOUS BLD VENIPUNCTURE: CPT

## 2020-06-01 PROCEDURE — 2580000003 HC RX 258: Performed by: INTERNAL MEDICINE

## 2020-06-01 PROCEDURE — 99152 MOD SED SAME PHYS/QHP 5/>YRS: CPT | Performed by: INTERNAL MEDICINE

## 2020-06-01 PROCEDURE — 93005 ELECTROCARDIOGRAM TRACING: CPT | Performed by: INTERNAL MEDICINE

## 2020-06-01 PROCEDURE — 2709999900 HC NON-CHARGEABLE SUPPLY

## 2020-06-01 PROCEDURE — 99024 POSTOP FOLLOW-UP VISIT: CPT | Performed by: INTERNAL MEDICINE

## 2020-06-01 PROCEDURE — 6370000000 HC RX 637 (ALT 250 FOR IP)

## 2020-06-01 PROCEDURE — 85027 COMPLETE CBC AUTOMATED: CPT

## 2020-06-01 PROCEDURE — 71046 X-RAY EXAM CHEST 2 VIEWS: CPT

## 2020-06-01 RX ORDER — ASPIRIN 81 MG/1
81 TABLET, CHEWABLE ORAL DAILY
Status: DISCONTINUED | OUTPATIENT
Start: 2020-06-01 | End: 2020-06-02 | Stop reason: HOSPADM

## 2020-06-01 RX ORDER — METOPROLOL SUCCINATE 25 MG/1
25 TABLET, EXTENDED RELEASE ORAL DAILY
Status: DISCONTINUED | OUTPATIENT
Start: 2020-06-01 | End: 2020-06-02 | Stop reason: HOSPADM

## 2020-06-01 RX ORDER — LOSARTAN POTASSIUM AND HYDROCHLOROTHIAZIDE 25; 100 MG/1; MG/1
1 TABLET ORAL DAILY
Status: DISCONTINUED | OUTPATIENT
Start: 2020-06-01 | End: 2020-06-01

## 2020-06-01 RX ORDER — ONDANSETRON 2 MG/ML
4 INJECTION INTRAMUSCULAR; INTRAVENOUS EVERY 6 HOURS PRN
Status: DISCONTINUED | OUTPATIENT
Start: 2020-06-01 | End: 2020-06-02 | Stop reason: HOSPADM

## 2020-06-01 RX ORDER — CELECOXIB 200 MG/1
200 CAPSULE ORAL 2 TIMES DAILY
Status: DISCONTINUED | OUTPATIENT
Start: 2020-06-01 | End: 2020-06-02 | Stop reason: HOSPADM

## 2020-06-01 RX ORDER — LOSARTAN POTASSIUM 100 MG/1
100 TABLET ORAL DAILY
Status: DISCONTINUED | OUTPATIENT
Start: 2020-06-02 | End: 2020-06-02 | Stop reason: HOSPADM

## 2020-06-01 RX ORDER — SODIUM CHLORIDE 9 MG/ML
INJECTION, SOLUTION INTRAVENOUS CONTINUOUS
Status: DISCONTINUED | OUTPATIENT
Start: 2020-06-01 | End: 2020-06-02 | Stop reason: HOSPADM

## 2020-06-01 RX ORDER — ATORVASTATIN CALCIUM 80 MG/1
80 TABLET, FILM COATED ORAL NIGHTLY
Status: DISCONTINUED | OUTPATIENT
Start: 2020-06-01 | End: 2020-06-02 | Stop reason: HOSPADM

## 2020-06-01 RX ORDER — ATORVASTATIN CALCIUM 40 MG/1
40 TABLET, FILM COATED ORAL DAILY
Status: DISCONTINUED | OUTPATIENT
Start: 2020-06-01 | End: 2020-06-01

## 2020-06-01 RX ORDER — PROMETHAZINE HYDROCHLORIDE 12.5 MG/1
12.5 TABLET ORAL EVERY 6 HOURS PRN
Status: DISCONTINUED | OUTPATIENT
Start: 2020-06-01 | End: 2020-06-02 | Stop reason: HOSPADM

## 2020-06-01 RX ORDER — HYDROCHLOROTHIAZIDE 25 MG/1
25 TABLET ORAL DAILY
Status: DISCONTINUED | OUTPATIENT
Start: 2020-06-02 | End: 2020-06-02 | Stop reason: HOSPADM

## 2020-06-01 RX ORDER — OXYCODONE HYDROCHLORIDE AND ACETAMINOPHEN 5; 325 MG/1; MG/1
2 TABLET ORAL EVERY 4 HOURS PRN
Status: DISCONTINUED | OUTPATIENT
Start: 2020-06-01 | End: 2020-06-02 | Stop reason: HOSPADM

## 2020-06-01 RX ADMIN — IOPAMIDOL 261 ML: 612 INJECTION, SOLUTION INTRAVENOUS at 13:40

## 2020-06-01 RX ADMIN — OXYCODONE HYDROCHLORIDE AND ACETAMINOPHEN 2 TABLET: 5; 325 TABLET ORAL at 14:17

## 2020-06-01 RX ADMIN — CELECOXIB 200 MG: 200 CAPSULE ORAL at 21:34

## 2020-06-01 RX ADMIN — SODIUM CHLORIDE: 9 INJECTION, SOLUTION INTRAVENOUS at 10:30

## 2020-06-01 RX ADMIN — METOPROLOL SUCCINATE 25 MG: 25 TABLET, EXTENDED RELEASE ORAL at 21:34

## 2020-06-01 RX ADMIN — ASPIRIN 81 MG 81 MG: 81 TABLET ORAL at 21:48

## 2020-06-01 ASSESSMENT — PAIN SCALES - GENERAL
PAINLEVEL_OUTOF10: 0
PAINLEVEL_OUTOF10: 8
PAINLEVEL_OUTOF10: 0
PAINLEVEL_OUTOF10: 0

## 2020-06-01 NOTE — LETTER
[facility]  Cardiac Rehab Department  [address]  [phone]  Toll Free [phone]          Brianna 3, 2020    Dear Feliberto [place],    Please find this informational packet that has been sent to you on heart disease and the guidelines you are to follow concerning your present cardiac condition and immediate recovery. Due to your recent cardiac diagnosis and intervention you now qualify for participation in a Phase II Outpatient Cardiac Rehab Program.  This elective service has been shown to significantly reduce cardiac mortality by 26-31% and increase longevity by as much as 5 years among patients such as yourself! A brochure has been included in this mailing for the purpose of providing you with a brief overview of program components. In an abundance of caution, the [address] program has been temporarily suspended until further notice due to COVID-19 concerns. Fortunately, most insurance companies allow a 6-12 month window of opportunity after your coronary intervention for you to take full advantage of this most beneficial service. If you live locally you will be notified via phone or mail once this suspension has been lifted. Should you live outside a 25 mile radius of [facility], we recommend you contact the hospital nearest your residence to check on program availability and the opportunity to enroll at that site. In the meantime, feel free to reach out to us with questions or otherwise and our staff will be more than pleased to assist you. Thank you. To the betterment of your health,        [facility] Cardiac Rehab Staff    SEEMA Roberto BS, MA  Registered Nurse  Registered Nurse   Exercise Physiologist

## 2020-06-01 NOTE — H&P
2019    Degeneration of intervertebral disc at C4-C5 level 2019    Diabetes mellitus ([address]     Hyperlipidemia     Hypertension          Past Surgical History:    Past Surgical History:   Procedure Laterality Date    BACK SURGERY      CARDIAC SURGERY  1999    CABG X 4VLIMA to LAD, SVG to OM, diag 1 and RCA    CERVICAL FUSION N/A 2019    C3-4 C4-5 ACDF performed by Clara Arriola MD at [address] [place] [address] Right     scope, age 35ish          Home Medications:   Prior to Admission medications    Medication Sig Start Date End Date Taking? Authorizing Provider   Meloxicam (MOBIC PO) Take by mouth   Yes Historical Provider, MD   celecoxib (CELEBREX) 200 MG capsule Take 200 mg by mouth 2 times daily   Yes Historical Provider, MD   metFORMIN (GLUCOPHAGE) 500 MG tablet Take 500 mg by mouth daily (with breakfast)   Yes Historical Provider, MD   atorvastatin (LIPITOR) 40 MG tablet Take 40 mg by mouth daily   Yes Historical Provider, MD   losartan-hydrochlorothiazide (HYZAAR) 100-25 MG per tablet Take 1 tablet by mouth daily   Yes Historical Provider, MD   aspirin 81 MG tablet Take 81 mg by mouth daily    Historical Provider, MD        Facility Administered Medications: Allergies:  Patient has no known allergies.      Social History:       Social History     Socioeconomic History    Marital status:      Spouse name: Not on file    Number of children: Not on file    Years of education: Not on file    Highest education level: Not on file   Occupational History    Not on file   Social Needs    Financial resource strain: Not on file    Food insecurity     Worry: Not on file     Inability: Not on file    Transportation needs     Medical: Not on file     Non-medical: Not on file   Tobacco Use    Smoking status: Former Smoker     Types: Cigarettes     Last attempt to quit: 1995     Years since quittin.1    Smokeless tobacco: Never Used   Substance and Sexual Activity    Alcohol use: No    Drug use: No    Sexual activity: Not on file   Lifestyle    Physical activity     Days per week: Not on file     Minutes per session: Not on file    Stress: Not on file   Relationships    Social connections     Talks on phone: Not on file     Gets together: Not on file     Attends Spiritism service: Not on file     Active member of club or organization: Not on file     Attends meetings of clubs or organizations: Not on file     Relationship status: Not on file    Intimate partner violence     Fear of current or ex partner: Not on file     Emotionally abused: Not on file     Physically abused: Not on file     Forced sexual activity: Not on file   Other Topics Concern    Not on file   Social History Narrative    Not on file       Family History:     Family History   Problem Relation Age of Onset    Heart Disease Mother     Diabetes Mother     Cancer Father         LUNG CA         REVIEW OF SYSTEMS:     Except as noted in the HPI, all other systems are negative        PHYSICAL EXAMINATION:     /79   Pulse 60   Temp 97.6 °F (36.4 °C) (Tympanic)   Resp 14   Ht 6' (1.829 m)   Wt 240 lb (108.9 kg)   SpO2 95%   BMI 32.55 kg/m²     GENERAL - well developed and well nourished, in no amount of generalized distress; is an active participant in this examination  HEENT -  PERRLA, Hearing appears normal, conjunctiva and lids are normal, ears and nose appear normal  NECK - no thyromegaly, no JVD, trachea is in the midline  CARDIOVASCULAR - PMI is in the left mid line clavicular position, Normal S1 and S2 with a grade 1/6 systolic murmur. No S3 or S4    PULMONARY - No respiratory distress. scattered wheezes and rales.   Breath sounds in both  lung fields are Normal  ABDOMEN  - soft, non tender, no rebound, no hepatomegaly or splenomegaly  MUSCULOSKELETAL  - Prone/Supine, digitals and nails are without clubbing or cyanosis  EXTREMITIES - trace edema  NEUROLOGIC - cranial possible diaphragmatic attenuation versus inferior MI,  EF 52%, -1% ischemic myocardium on stress, uninterpretable risk findings, AUC indication 21, AUC score 7, Scarlett Burton MD)       Date of the Proposed Procedure:  06/01/20      Proposed Procedure:  Selective left heart and coronary arteriography with selective engagement of the bypass conduits with possible percutaneous coronary interventon, (femoral approach), 06/01/20      :  NIA Jay MD    Indications:  3/12/2020  lexiscan Positive for possible diaphragmatic attenuation versus inferior MI,  EF 52%, -1% ischemic myocardium on stress, uninterpretable risk findings, AUC indication 21, AUC score 7, (Ya Zhao MD)     American Society of Anesthesiologists (ASA) Classification:  III    Plan of Sedation:  Moderate Sedation    Mallampati Classification:  II    I have examined this patient on 06/01/20  in [facility] Area # 6 in the presence of Benito Walter RN and find no interval changes since the original History and Physical / Consult as noted written by myself on 06/01/20     With the constellation of symptoms and these findings, I recommend cardiac catheterization and possibility of percutaneous coronary intervention. I discussed with him  in detail  the risks, benefits and alternatives to this procedure. The risks mentioned to him include but are not limited to:  vascular complications in ~ 3%, stroke <1%, renal dysfunction <5%, myocardial infarction <1%, coronary dissection <1%, need for emergency open heart surgery <1, and death <1% . He appeared to understand, had no questions, and agreed to proceed with this plan. Additionally, there are risks associated with moderate sedation which includes transient drop in blood pressure and need for assisted ventilation. This procedure is scheduled for 06/01/20 .     Abeba Terry MD

## 2020-06-02 VITALS
HEART RATE: 58 BPM | SYSTOLIC BLOOD PRESSURE: 124 MMHG | RESPIRATION RATE: 16 BRPM | BODY MASS INDEX: 32.51 KG/M2 | OXYGEN SATURATION: 93 % | TEMPERATURE: 96.3 F | HEIGHT: 72 IN | DIASTOLIC BLOOD PRESSURE: 69 MMHG | WEIGHT: 240 LBS

## 2020-06-02 PROCEDURE — 99217 PR OBSERVATION CARE DISCHARGE MANAGEMENT: CPT | Performed by: INTERNAL MEDICINE

## 2020-06-02 PROCEDURE — 6370000000 HC RX 637 (ALT 250 FOR IP): Performed by: INTERNAL MEDICINE

## 2020-06-02 PROCEDURE — G0378 HOSPITAL OBSERVATION PER HR: HCPCS

## 2020-06-02 PROCEDURE — 96372 THER/PROPH/DIAG INJ SC/IM: CPT

## 2020-06-02 PROCEDURE — 6360000002 HC RX W HCPCS: Performed by: INTERNAL MEDICINE

## 2020-06-02 RX ORDER — ASPIRIN 81 MG/1
81 TABLET, CHEWABLE ORAL DAILY
Status: DISCONTINUED | OUTPATIENT
Start: 2020-06-03 | End: 2020-06-02 | Stop reason: HOSPADM

## 2020-06-02 RX ORDER — ONDANSETRON 2 MG/ML
4 INJECTION INTRAMUSCULAR; INTRAVENOUS EVERY 6 HOURS PRN
Status: DISCONTINUED | OUTPATIENT
Start: 2020-06-02 | End: 2020-06-02 | Stop reason: HOSPADM

## 2020-06-02 RX ORDER — PRASUGREL 10 MG/1
10 TABLET, FILM COATED ORAL DAILY
Qty: 30 TABLET | Refills: 5 | Status: SHIPPED | OUTPATIENT
Start: 2020-06-03 | End: 2020-06-10 | Stop reason: SDUPTHER

## 2020-06-02 RX ORDER — PRASUGREL 10 MG/1
10 TABLET, FILM COATED ORAL DAILY
Status: DISCONTINUED | OUTPATIENT
Start: 2020-06-03 | End: 2020-06-02 | Stop reason: HOSPADM

## 2020-06-02 RX ORDER — ATORVASTATIN CALCIUM 80 MG/1
80 TABLET, FILM COATED ORAL NIGHTLY
Qty: 30 TABLET | Refills: 3 | Status: ON HOLD | OUTPATIENT
Start: 2020-06-02 | End: 2021-07-23 | Stop reason: ALTCHOICE

## 2020-06-02 RX ORDER — METOPROLOL SUCCINATE 25 MG/1
25 TABLET, EXTENDED RELEASE ORAL DAILY
Qty: 30 TABLET | Refills: 3 | Status: ON HOLD | OUTPATIENT
Start: 2020-06-03 | End: 2021-07-23 | Stop reason: ALTCHOICE

## 2020-06-02 RX ADMIN — CELECOXIB 200 MG: 200 CAPSULE ORAL at 08:28

## 2020-06-02 RX ADMIN — LOSARTAN POTASSIUM 100 MG: 100 TABLET, FILM COATED ORAL at 08:28

## 2020-06-02 RX ADMIN — METOPROLOL SUCCINATE 25 MG: 25 TABLET, EXTENDED RELEASE ORAL at 08:28

## 2020-06-02 RX ADMIN — HYDROCHLOROTHIAZIDE 25 MG: 25 TABLET ORAL at 08:28

## 2020-06-02 RX ADMIN — ASPIRIN 81 MG 81 MG: 81 TABLET ORAL at 08:28

## 2020-06-02 RX ADMIN — ENOXAPARIN SODIUM 40 MG: 40 INJECTION SUBCUTANEOUS at 08:29

## 2020-06-02 ASSESSMENT — PAIN SCALES - GENERAL
PAINLEVEL_OUTOF10: 0
PAINLEVEL_OUTOF10: 0

## 2020-06-02 NOTE — PLAN OF CARE
Problem: Pain:  Goal: Pain level will decrease  Description: Pain level will decrease  6/1/2020 2349 by Megan Webb RN  Outcome: Ongoing  Goal: Control of acute pain  Description: Control of acute pain  6/1/2020 2349 by Megan Webb RN  Outcome: Ongoing  Goal: Control of chronic pain  Description: Control of chronic pain  6/1/2020 2349 by Megan Webb RN  Outcome: Ongoing  Goal: Patient's pain/discomfort is manageable  Description: Patient's pain/discomfort is manageable  6/1/2020 2349 by Megan Webb RN  Outcome: Ongoing     Problem: Infection:  Goal: Will remain free from infection  Description: Will remain free from infection  6/1/2020 2349 by Megan Webb RN  Outcome: Ongoing     Problem: Safety:  Goal: Free from accidental physical injury  Description: Free from accidental physical injury  6/2/2020 0946 by Erick Stacy RN  Outcome: Ongoing  6/1/2020 2349 by Megan Webb RN  Outcome: Ongoing  Goal: Free from intentional harm  Description: Free from intentional harm  6/1/2020 2349 by Megan Webb RN  Outcome: Ongoing     Problem: Daily Care:  Goal: Daily care needs are met  Description: Daily care needs are met  6/1/2020 2349 by Megan Webb RN  Outcome: Ongoing     Problem: Skin Integrity:  Goal: Skin integrity will stabilize  Description: Skin integrity will stabilize  6/1/2020 2349 by Megan Webb RN  Outcome: Ongoing     Problem: Discharge Planning:  Goal: Patients continuum of care needs are met  Description: Patients continuum of care needs are met  6/1/2020 2349 by Megan Webb RN  Outcome: Ongoing     Problem: Cardiac:  Goal: Ability to maintain an adequate cardiac output will improve  Description: Ability to maintain an adequate cardiac output will improve  6/2/2020 0946 by Erick Stacy RN  Outcome: Ongoing  6/1/2020 2349 by Megan Webb RN  Outcome: Ongoing  Goal: Hemodynamic stability will improve  Description: Hemodynamic stability will improve  6/1/2020 2349 by Megan Webb RN  Outcome: Ongoing Problem: Bleeding:  Goal: Will show no signs and symptoms of excessive bleeding  Description: Will show no signs and symptoms of excessive bleeding  6/2/2020 0946 by Jamee Salas RN  Outcome: Ongoing  6/1/2020 2349 by Sarah Enriquez RN  Outcome: Ongoing

## 2020-06-02 NOTE — PROCEDURES
Left ventricular pressure 140/5 mmHg   Left ventricular end-diastolic pressure (LVEDP) 18 mmHg   Aortic pressure 127/58 mmHg   Mean aortic pressure 87 mmHg       Coronary Arteries:    Left Main Coronary Artery:  A large vessel which arises from the left sinus of Valsalva. It divides into the left anterior descending coronary artery and the left circumflex. There is mild diffuse disease throughout the entire length of the vessel. Left Anterior Coronary Artery:  The LAD is a moderate sized vessel with several diagonal branches. There is a 100% stenosis in the proximal portion of this vessel. The left CHAGO is patent to the LAD,  A vein graft to the diagonal is patent. Left Circumflex Coronary Artery:  The LCx is a moderate sized vessel with several marginal branches. There is severe diffuse disease, between greater than 80%, throughout the entire length of the vessel. All the marginal vessels are occluded. A vein graft to the obtuse marginal is patient. Right Coronary Artery:  The RCA is a moderate sized dominant vessel which arises from the right sinus of Valsalva. There is a 100% stenosis in the proximal portion of this vessel. A vein graft to the RCA has an 80% lesion in the proximal portion of this vein graft. Left Ventriculogram:  The left ventriculogram is obtained in the right oblique projection. The is mild diaphragmatic hypokinesis. All other regional wall segments move appropriately. The estimated visual ejection fraction is 50%. There is no mitral regurgitation or pull back gradient seen across the aortic valve. Internal Mammary Artery Angiography    Left internal mammary artery angiography:  The left CHAGO is grafted to the LAD. While mild luminal irregularities are identified, focal stenoses are not seen. Right internal mammary artery angiography:  The right CHAGO is widely patent and ungrafted.   It appears to be of suitable caliber for the use in bypass surgery in the

## 2020-06-02 NOTE — DISCHARGE SUMMARY
ILLNESS INCLUDE: (IF APPLICABLE):     Presentation:  He  presented with need for cardiac catheterization.       The patient was seen in the office on 3/26/2020 by Leonardo MCMANUS and the following was noted:     \"CABG x4 in 1999  Had negative stress test in 2017.  2D echo and December 2018 showed normal LV size and function with an EF 81%.  Diastolic dysfunction noted.  Mild LVH with no significant valve disease  Previously followed with DR Luc Monson.  Follows with his primary doctor regularly  Patient establish care with this office earlier this month and preoperative evaluation with knee replacement with Dr. Nadia Carroll underwent nuclear stress testing  Stress test showed possible attenuation versus inferior infarct.  Estimated ejection fraction 52%. Reviewed imaging with Dr. Augustina Morales.  Suggested heart catheterization. [facility] with current pandemic elective procedures are being postponed till least May.     He has a constant headaches- since his neck surgery  He has constant chest pressure that is ongoing.  He continues to have GEORGES with activity and exertion.  Activities declined due to knee pain and fatigue.        Subjective  Martin denies exertional chest pain, resting shortness of breath, orthopnea, paroxysmal nocturnal dyspnea, syncope, presyncope, arrhythmia, edema and fatigue.  The patient denies numbness or weakness to suggest cerebrovascular accident or transient ischemic attack.  \"      Additionally,  It was noted by the same observer at that time:     \"Preoperative evaluation for upcoming knee surgery.  Underwent nuclear stress testing that was suggestive of infarct. [facility] is not had any recent evaluation with ongoing GEORGES but is somewhat worse.  Recommend heart catheterization for better evaluation of ischemia prior to preoperative risk stratification. Discussed procedure risks and benefits. [facility] is ready to proceed. \"     With these symptoms, the patient had previously undergone the following 24 06/01/2020    BUN 16 06/01/2020    CREATININE 0.7 06/01/2020    CALCIUM 9.6 06/01/2020         Discharge Medications:     Current Discharge Medication List           Details   metoprolol succinate (TOPROL XL) 25 MG extended release tablet Take 1 tablet by mouth daily  Qty: 30 tablet, Refills: 3      prasugrel (EFFIENT) 10 MG TABS Take 1 tablet by mouth daily  Qty: 30 tablet, Refills: 5              Details   atorvastatin (LIPITOR) 80 MG tablet Take 1 tablet by mouth nightly  Qty: 30 tablet, Refills: 3      metFORMIN (GLUCOPHAGE) 500 MG tablet HOLD METFORMIN FOR 48 HOURS AFTER THE CARDIAC CATHETERIZATION  Qty: 60 tablet, Refills: 3              Details   Meloxicam (MOBIC PO) Take by mouth      celecoxib (CELEBREX) 200 MG capsule Take 200 mg by mouth 2 times daily      losartan-hydrochlorothiazide (HYZAAR) 100-25 MG per tablet Take 1 tablet by mouth daily      aspirin 81 MG tablet Take 81 mg by mouth daily               Condition At Discharge:  Improved    Disposition:        1. Home  2. Follow up with cardiology as arranged  3. Follow up with primary care provider as arranged  4. For patients who underwent percutaneous coronary intervention during this hospitalization, they will be sent home on:     A. Aspirin, yes, ASA 81 mg orally once a day    B. Antiplatelet, yes, Effient 10 mg orally once a day    C. Beta - blocker, yes, Toprol 25 mg orally once a day    D. ACE-inhibitor or ARB, yes, Cozaar 100 and HCTZ 25 mg orally once a day    E. Statin, yes, Lipitor 80 mg orally once a day      Ok with me to discharge after the bedrest is complete, hemostatis is achieved, the patient is hemodynamically stable and comfortable. Please remind the patient that driving is absolutely prohibited for the next day (24 hours) after this procedure. Additionally, caution should be exercised to avoid heights, swimming, tub baths, open flames, or heavy machinery.         Electronically signed by Eric Eason MD on 6/2/20

## 2020-06-02 NOTE — CONSULTS
HPI      History    Past Medical History:   Diagnosis Date    CAD (coronary artery disease)     Cervical myel[facility] ([facility] [address] 2019    Degeneration of intervertebral disc at C4-C5 level 2019    Diabetes mellitus ([facility] [address]     Hyperlipidemia     Hypertension      Past Surgical History:   Procedure Laterality Date    BACK SURGERY      CARDIAC SURGERY  1999    CABG X 4VLIMA to LAD, SVG to OM, diag 1 and RCA    CERVICAL FUSION N/A 2019    C3-4 C4-5 ACDF performed by Marion Munoz MD at [address] [place] [address] Right     scope, age 35ish      Family History   Problem Relation Age of Onset    Heart Disease Mother     Diabetes Mother     Cancer Father         LUNG CA     Social History     Tobacco Use    Smoking status: Former Smoker     Types: Cigarettes     Last attempt to quit: 1995     Years since quittin.1    Smokeless tobacco: Never Used   Substance Use Topics    Alcohol use: No    Drug use: No     Medications Prior to Admission: Meloxicam (MOBIC PO), Take by mouth  celecoxib (CELEBREX) 200 MG capsule, Take 200 mg by mouth 2 times daily  metFORMIN (GLUCOPHAGE) 500 MG tablet, Take 500 mg by mouth daily (with breakfast)  atorvastatin (LIPITOR) 40 MG tablet, Take 40 mg by mouth daily  losartan-hydrochlorothiazide (HYZAAR) 100-25 MG per tablet, Take 1 tablet by mouth daily  aspirin 81 MG tablet, Take 81 mg by mouth daily  Patient has no known allergies. Objective     Vital Signs   All pre-op and post op vitals reviewed           Physical Exam:  Constitutional: oriented to person, place, and time. appears well-developed. HEENT:   Head: Normocephalic and atraumatic. Eyes: Pupils are equal, round, and reactive to light. Neck: Neck supple. Cardiovascular: Regular rhythm and normal heart sounds. No lift or rub appreciated. Pulmonary/Chest: Effort normal and breath sounds normal. CTAB. No labored breating. Abdominal: Soft.  Bowel sounds are normal. There is no appreciable  distension. There is no point tenderness. no rebounding or guarding. Musculoskeletal: Normal range of motion on other than surgically repaired joint . no edema or tenderness other than surgical area. Post-op changes noted  Neurological:  alert and oriented to person, place, and time. normal reflexes. No focal deficits  Skin: Skin is warm and dry. No new rashes appreciated. Results Review:   I reviewed the patient's new imaging results and agree with the interpretation. Active Problems: Treatment recommendations    Abnormal nuclear cardiac imaging test  Coronary artery occlusive disease status post PTCA stenting  Essential hypertension  Diabetes mellitus type 2  Mixed dyslipidemia  Osteoarthritis    Medically stable status post PTCA with stenting  Continue dual antiplatelet therapy, maximize medical efforts  Continue metformin per protocol  Maximize statin therapy Lipitor  Continue antihypertensive regimen  Medically cleared for discharge when okay with cardiology  I discussed the patients findings and my recommendations with patient staff and the cardiac team.  Lo Saenz  06/02/20  7:52 AM    Events noted. Restart metformin 48 hours after dye. Maximize medical intervention. Stable for discharge home. Follow-up with in the week for transitional care/hospital follow-up visit via tele-med. I have discussed the care of Henrietta Andujar, including pertinent history and exam findings with the ARNP/PA. I have seen and examined the patient and the key elements of all parts of the encounter have been performed by me. I agree with the assessment and plan as outlined by the ARNP/PA. Please refer to my separate note for complete documentation.      Electronically signed by Sarahi Khan MD on 6/2/2020 at 8:27 AM

## 2020-06-10 RX ORDER — PRASUGREL 10 MG/1
10 TABLET, FILM COATED ORAL DAILY
Qty: 90 TABLET | Refills: 3 | Status: SHIPPED | OUTPATIENT
Start: 2020-06-10 | End: 2020-06-26 | Stop reason: SDUPTHER

## 2020-06-26 RX ORDER — PRASUGREL 10 MG/1
10 TABLET, FILM COATED ORAL DAILY
Qty: 90 TABLET | Refills: 3 | Status: SHIPPED | OUTPATIENT
Start: 2020-06-26

## 2020-06-29 ENCOUNTER — OFFICE VISIT (OUTPATIENT)
Dept: CARDIOLOGY | Age: 70
End: 2020-06-29
Payer: MEDICARE

## 2020-06-29 VITALS
HEART RATE: 63 BPM | BODY MASS INDEX: 33.05 KG/M2 | HEIGHT: 72 IN | WEIGHT: 244 LBS | DIASTOLIC BLOOD PRESSURE: 62 MMHG | SYSTOLIC BLOOD PRESSURE: 118 MMHG

## 2020-06-29 PROBLEM — Z95.5 HISTORY OF CORONARY ARTERY STENT PLACEMENT: Status: ACTIVE | Noted: 2020-06-29

## 2020-06-29 PROBLEM — E78.2 MIXED HYPERLIPIDEMIA: Status: ACTIVE | Noted: 2020-06-29

## 2020-06-29 PROBLEM — Z95.1 HX OF CABG: Status: ACTIVE | Noted: 2020-06-29

## 2020-06-29 PROBLEM — I10 ESSENTIAL HYPERTENSION: Status: ACTIVE | Noted: 2020-06-29

## 2020-06-29 PROCEDURE — G8427 DOCREV CUR MEDS BY ELIG CLIN: HCPCS | Performed by: NURSE PRACTITIONER

## 2020-06-29 PROCEDURE — 99214 OFFICE O/P EST MOD 30 MIN: CPT | Performed by: NURSE PRACTITIONER

## 2020-06-29 PROCEDURE — G8417 CALC BMI ABV UP PARAM F/U: HCPCS | Performed by: NURSE PRACTITIONER

## 2020-06-29 PROCEDURE — 1123F ACP DISCUSS/DSCN MKR DOCD: CPT | Performed by: NURSE PRACTITIONER

## 2020-06-29 PROCEDURE — 1036F TOBACCO NON-USER: CPT | Performed by: NURSE PRACTITIONER

## 2020-06-29 PROCEDURE — 4040F PNEUMOC VAC/ADMIN/RCVD: CPT | Performed by: NURSE PRACTITIONER

## 2020-06-29 PROCEDURE — 3017F COLORECTAL CA SCREEN DOC REV: CPT | Performed by: NURSE PRACTITIONER

## 2020-06-29 NOTE — PATIENT INSTRUCTIONS
another kind of pain reliever, such as acetaminophen (Tylenol). When should you call for help? [id number] if you have symptoms of a heart attack. These may include:  · Chest pain or pressure, or a strange feeling in the chest.  · Sweating. · Shortness of breath. · Pain, pressure, or a strange feeling in the back, neck, jaw, or upper belly or in one or both shoulders or arms. · Lightheadedness or sudden weakness. · A fast or irregular heartbeat. After you call 911, the  may tell you to chew 1 adult-strength or 2 to 4 low-dose aspirin. Wait for an ambulance. Do not try to drive yourself. Watch closely for changes in your health, and be sure to contact your doctor if you have any problems. Where can you learn more? Go to https://BizAnytime.Petbrosia. org and sign in to your [employer] account. Enter F423 in the Spool box to learn more about \"A Healthy Heart: Care Instructions. \"     If you do not have an account, please click on the \"Sign Up Now\" link. Current as of: December 16, 2019               Content Version: 12.5  © 2445-2230 [employer]. Care instructions adapted under license by [facility] ([facility]). If you have questions about a medical condition or this instruction, always ask your healthcare professional. [place] [address] any warranty or liability for your use of this information. Patient Education        Learning About Coronary Artery Disease (CAD)  What is coronary artery disease? Coronary artery disease (CAD) occurs when plaque builds up in the arteries that bring oxygen-rich blood to your heart. Plaque is a fatty substance made of cholesterol, calcium, and other substances in the blood. This process is called hardening of the arteries, or atherosclerosis. What happens when you have coronary artery disease? · Plaque may narrow the coronary arteries. Narrowed arteries cause poor blood flow.  This can lead to angina symptoms such

## 2020-06-29 NOTE — PROGRESS NOTES
hypertension I10    Mixed hyperlipidemia E78.2    Hx of CABG Z95.1    History of coronary artery stent placement Z95.5     Past Medical History:   Diagnosis Date    CAD (coronary artery disease)     Cervical myelopathy ([facility] [address] 2019    Degeneration of intervertebral disc at C4-C5 level 2019    Diabetes mellitus ([facility] [address]     Hyperlipidemia     Hypertension      Past Surgical History:   Procedure Laterality Date    BACK SURGERY      CARDIAC SURGERY  1999    CABG X 4VLIMA to LAD, SVG to OM, diag 1 and RCA    CERVICAL FUSION N/A 2019    C3-4 C4-5 ACDF performed by Sue Iverson MD at [address] [place] [address] CATH LAB PROCEDURE      KNEE SURGERY Right     scope, age 35ish      Family History   Problem Relation Age of Onset    Heart Disease Mother     Diabetes Mother     Cancer Father         LUNG CA     Social History     Tobacco Use    Smoking status: Former Smoker     Types: Cigarettes     Last attempt to quit: 1995     Years since quittin.2    Smokeless tobacco: Never Used   Substance Use Topics    Alcohol use: No      Current Outpatient Medications   Medication Sig Dispense Refill    prasugrel (EFFIENT) 10 MG TABS Take 1 tablet by mouth daily 90 tablet 3    atorvastatin (LIPITOR) 80 MG tablet Take 1 tablet by mouth nightly 30 tablet 3    metoprolol succinate (TOPROL XL) 25 MG extended release tablet Take 1 tablet by mouth daily 30 tablet 3    metFORMIN (GLUCOPHAGE) 500 MG tablet HOLD METFORMIN FOR 48 HOURS AFTER THE CARDIAC CATHETERIZATION (Patient taking differently: Take 500 mg by mouth daily (with breakfast) HOLD METFORMIN FOR 48 HOURS AFTER THE CARDIAC CATHETERIZATION) 60 tablet 3    aspirin 81 MG tablet Take 81 mg by mouth daily      Meloxicam (MOBIC PO) Take by mouth      celecoxib (CELEBREX) 200 MG capsule Take 200 mg by mouth 2 times daily      losartan-hydrochlorothiazide (HYZAAR) 100-25 MG per tablet Take 1 tablet by mouth daily

## 2020-06-30 ENCOUNTER — TELEPHONE (OUTPATIENT)
Dept: CARDIOLOGY | Age: 70
End: 2020-06-30

## 2020-06-30 NOTE — TELEPHONE ENCOUNTER
Date of Surgery: 7-20-20    Cardiologist: Dr. Trini Sierra    Procedure: Right total knee replacement    Surgeon: Dr. Tamar Myers    Last Office Visit: 6-29-20  Reason for office visit and medical concerns addressed at this office visit: CAD, HTN, Hyperlipidemia, Hx of Cabg    Testing Performed and Date of Service:  Heart Cath 6-1-20 with BMS    RCRI = low, 1 pt, 0.9%  METs 4    Current Medications: Effient, Lipitor, Toprol, Metformin, ASA, mobic, celebrex, Hyzaar    Is the patient currently taking an anticoagulant? If so, what is the diagnosis the patient has been given to warrant the need for the anticoagulant? Effient for BMS    Additional Notes: Cardiac Risk Request     Pt can NOT hold effient and they are NOT asking.

## 2020-06-30 NOTE — TELEPHONE ENCOUNTER
Ok to cardiac risk stratify. Just confirming surgeon aware he is on Effient and ASA?   Thanks, [employer]

## 2020-07-14 ENCOUNTER — HOSPITAL ENCOUNTER (OUTPATIENT)
Dept: PREADMISSION TESTING | Age: 70
Discharge: HOME | DRG: 470 | End: 2020-07-18
Payer: MEDICARE

## 2020-07-14 VITALS — HEIGHT: 72 IN | WEIGHT: 238 LBS | BODY MASS INDEX: 32.23 KG/M2

## 2020-07-14 LAB
ABO/RH: NORMAL
ALBUMIN SERPL-MCNC: 4.1 G/DL (ref 3.5–5.2)
ALP BLD-CCNC: 58 U/L (ref 40–130)
ALT SERPL-CCNC: 38 U/L (ref 5–41)
ANION GAP SERPL CALCULATED.3IONS-SCNC: 13 MMOL/L (ref 7–19)
ANTIBODY SCREEN: NORMAL
APTT: 30.4 SEC (ref 26–36.2)
AST SERPL-CCNC: 25 U/L (ref 5–40)
BASOPHILS ABSOLUTE: 0.1 K/UL (ref 0–0.2)
BASOPHILS RELATIVE PERCENT: 0.9 % (ref 0–1)
BILIRUB SERPL-MCNC: 0.6 MG/DL (ref 0.2–1.2)
BILIRUBIN URINE: NEGATIVE
BLOOD, URINE: NEGATIVE
BUN BLDV-MCNC: 22 MG/DL (ref 8–23)
CALCIUM SERPL-MCNC: 10 MG/DL (ref 8.8–10.2)
CHLORIDE BLD-SCNC: 100 MMOL/L (ref 98–111)
CLARITY: CLEAR
CO2: 25 MMOL/L (ref 22–29)
COLOR: YELLOW
CREAT SERPL-MCNC: 0.9 MG/DL (ref 0.5–1.2)
EOSINOPHILS ABSOLUTE: 0.4 K/UL (ref 0–0.6)
EOSINOPHILS RELATIVE PERCENT: 5.9 % (ref 0–5)
GFR AFRICAN AMERICAN: >59
GFR NON-AFRICAN AMERICAN: >60
GLUCOSE BLD-MCNC: 139 MG/DL (ref 74–109)
GLUCOSE URINE: NEGATIVE MG/DL
HBA1C MFR BLD: 6.1 % (ref 4–6)
HCT VFR BLD CALC: 45.9 % (ref 42–52)
HEMOGLOBIN: 15.2 G/DL (ref 14–18)
IMMATURE GRANULOCYTES #: 0 K/UL
INR BLD: 1.03 (ref 0.88–1.18)
KETONES, URINE: NEGATIVE MG/DL
LEUKOCYTE ESTERASE, URINE: NEGATIVE
LYMPHOCYTES ABSOLUTE: 2.1 K/UL (ref 1.1–4.5)
LYMPHOCYTES RELATIVE PERCENT: 31.6 % (ref 20–40)
MCH RBC QN AUTO: 29.2 PG (ref 27–31)
MCHC RBC AUTO-ENTMCNC: 33.1 G/DL (ref 33–37)
MCV RBC AUTO: 88.3 FL (ref 80–94)
MONOCYTES ABSOLUTE: 0.6 K/UL (ref 0–0.9)
MONOCYTES RELATIVE PERCENT: 9.9 % (ref 0–10)
NEUTROPHILS ABSOLUTE: 3.3 K/UL (ref 1.5–7.5)
NEUTROPHILS RELATIVE PERCENT: 51.2 % (ref 50–65)
NITRITE, URINE: NEGATIVE
PDW BLD-RTO: 13.5 % (ref 11.5–14.5)
PH UA: 6.5 (ref 5–8)
PLATELET # BLD: 174 K/UL (ref 130–400)
PMV BLD AUTO: 12.6 FL (ref 9.4–12.4)
POTASSIUM SERPL-SCNC: 3.5 MMOL/L (ref 3.5–5)
PROTEIN UA: NEGATIVE MG/DL
PROTHROMBIN TIME: 13.4 SEC (ref 12–14.6)
RBC # BLD: 5.2 M/UL (ref 4.7–6.1)
SODIUM BLD-SCNC: 138 MMOL/L (ref 136–145)
SPECIFIC GRAVITY UA: 1.03 (ref 1–1.03)
TOTAL PROTEIN: 7.8 G/DL (ref 6.6–8.7)
UROBILINOGEN, URINE: 1 E.U./DL
WBC # BLD: 6.5 K/UL (ref 4.8–10.8)

## 2020-07-14 PROCEDURE — 85025 COMPLETE CBC W/AUTO DIFF WBC: CPT

## 2020-07-14 PROCEDURE — 85610 PROTHROMBIN TIME: CPT

## 2020-07-14 PROCEDURE — 85730 THROMBOPLASTIN TIME PARTIAL: CPT

## 2020-07-14 PROCEDURE — 81003 URINALYSIS AUTO W/O SCOPE: CPT

## 2020-07-14 PROCEDURE — 83036 HEMOGLOBIN GLYCOSYLATED A1C: CPT

## 2020-07-14 PROCEDURE — 87081 CULTURE SCREEN ONLY: CPT

## 2020-07-14 PROCEDURE — 86850 RBC ANTIBODY SCREEN: CPT

## 2020-07-14 PROCEDURE — 86900 BLOOD TYPING SEROLOGIC ABO: CPT

## 2020-07-14 PROCEDURE — 86901 BLOOD TYPING SEROLOGIC RH(D): CPT

## 2020-07-14 PROCEDURE — 80053 COMPREHEN METABOLIC PANEL: CPT

## 2020-07-14 RX ORDER — ACETAMINOPHEN 500 MG
1000 TABLET ORAL ONCE
Status: CANCELLED | OUTPATIENT
Start: 2020-07-20

## 2020-07-14 RX ORDER — CELECOXIB 200 MG/1
200 CAPSULE ORAL ONCE
Status: CANCELLED | OUTPATIENT
Start: 2020-07-20

## 2020-07-14 RX ORDER — DEXAMETHASONE SODIUM PHOSPHATE 10 MG/ML
10 INJECTION, SOLUTION INTRAMUSCULAR; INTRAVENOUS ONCE
Status: CANCELLED | OUTPATIENT
Start: 2020-07-20

## 2020-07-14 RX ORDER — PREGABALIN 75 MG/1
75 CAPSULE ORAL ONCE
Status: CANCELLED | OUTPATIENT
Start: 2020-07-20

## 2020-07-14 RX ORDER — OXYCODONE HCL 10 MG/1
10 TABLET, FILM COATED, EXTENDED RELEASE ORAL ONCE
Status: CANCELLED | OUTPATIENT
Start: 2020-07-20

## 2020-07-16 ENCOUNTER — OFFICE VISIT (OUTPATIENT)
Age: 70
End: 2020-07-16

## 2020-07-16 VITALS — HEART RATE: 57 BPM | OXYGEN SATURATION: 97 % | TEMPERATURE: 97.3 F

## 2020-07-16 NOTE — PROGRESS NOTES
Patient was not seen//assessed by provider in the flu clinic today. COVID swab was order in compliance with requirement for testing prior to surgery or invasive procedure. Nasopharyngeal swab was collected and ordered through [facility] vendor.

## 2020-07-17 LAB
REPORT: NORMAL
SARS-COV-2: NOT DETECTED
THIS TEST SENT TO: NORMAL

## 2020-07-19 PROBLEM — M17.11 PRIMARY OSTEOARTHRITIS OF RIGHT KNEE: Status: ACTIVE | Noted: 2020-07-19

## 2020-07-20 ENCOUNTER — HOSPITAL ENCOUNTER (INPATIENT)
Age: 70
LOS: 2 days | Discharge: HOME HEALTH | DRG: 470 | End: 2020-07-22
Attending: ORTHOPAEDIC SURGERY | Admitting: ORTHOPAEDIC SURGERY
Payer: MEDICARE

## 2020-07-20 ENCOUNTER — ANESTHESIA (OUTPATIENT)
Dept: OPERATING ROOM | Age: 70
DRG: 470 | End: 2020-07-20
Payer: MEDICARE

## 2020-07-20 ENCOUNTER — APPOINTMENT (OUTPATIENT)
Dept: GENERAL RADIOLOGY | Age: 70
DRG: 470 | End: 2020-07-20
Attending: ORTHOPAEDIC SURGERY
Payer: MEDICARE

## 2020-07-20 ENCOUNTER — ANESTHESIA EVENT (OUTPATIENT)
Dept: OPERATING ROOM | Age: 70
DRG: 470 | End: 2020-07-20
Payer: MEDICARE

## 2020-07-20 VITALS
RESPIRATION RATE: 9 BRPM | SYSTOLIC BLOOD PRESSURE: 100 MMHG | OXYGEN SATURATION: 94 % | TEMPERATURE: 96.8 F | DIASTOLIC BLOOD PRESSURE: 62 MMHG

## 2020-07-20 PROBLEM — M17.10 ARTHRITIS OF KNEE: Status: ACTIVE | Noted: 2020-07-20

## 2020-07-20 PROBLEM — E11.51 DIABETES MELLITUS TYPE 2 WITH PERIPHERAL ARTERY DISEASE (HCC): Chronic | Status: ACTIVE | Noted: 2020-07-20

## 2020-07-20 LAB
ABO/RH: NORMAL
ANTIBODY SCREEN: NORMAL
GLUCOSE BLD-MCNC: 140 MG/DL (ref 70–99)
HBA1C MFR BLD: 6.2 % (ref 4–6)
PERFORMED ON: ABNORMAL

## 2020-07-20 PROCEDURE — 2500000003 HC RX 250 WO HCPCS: Performed by: ORTHOPAEDIC SURGERY

## 2020-07-20 PROCEDURE — 64447 NJX AA&/STRD FEMORAL NRV IMG: CPT | Performed by: NURSE ANESTHETIST, CERTIFIED REGISTERED

## 2020-07-20 PROCEDURE — 6360000002 HC RX W HCPCS: Performed by: ANESTHESIOLOGY

## 2020-07-20 PROCEDURE — 3600000015 HC SURGERY LEVEL 5 ADDTL 15MIN: Performed by: ORTHOPAEDIC SURGERY

## 2020-07-20 PROCEDURE — C9290 INJ, BUPIVACAINE LIPOSOME: HCPCS | Performed by: ORTHOPAEDIC SURGERY

## 2020-07-20 PROCEDURE — 0SRC0J9 REPLACEMENT OF RIGHT KNEE JOINT WITH SYNTHETIC SUBSTITUTE, CEMENTED, OPEN APPROACH: ICD-10-PCS | Performed by: ORTHOPAEDIC SURGERY

## 2020-07-20 PROCEDURE — 7100000000 HC PACU RECOVERY - FIRST 15 MIN: Performed by: ORTHOPAEDIC SURGERY

## 2020-07-20 PROCEDURE — 86901 BLOOD TYPING SEROLOGIC RH(D): CPT

## 2020-07-20 PROCEDURE — 2580000003 HC RX 258: Performed by: ORTHOPAEDIC SURGERY

## 2020-07-20 PROCEDURE — 97161 PT EVAL LOW COMPLEX 20 MIN: CPT

## 2020-07-20 PROCEDURE — 6360000002 HC RX W HCPCS: Performed by: ORTHOPAEDIC SURGERY

## 2020-07-20 PROCEDURE — 3700000001 HC ADD 15 MINUTES (ANESTHESIA): Performed by: ORTHOPAEDIC SURGERY

## 2020-07-20 PROCEDURE — 2720000010 HC SURG SUPPLY STERILE: Performed by: ORTHOPAEDIC SURGERY

## 2020-07-20 PROCEDURE — 7100000001 HC PACU RECOVERY - ADDTL 15 MIN: Performed by: ORTHOPAEDIC SURGERY

## 2020-07-20 PROCEDURE — C1776 JOINT DEVICE (IMPLANTABLE): HCPCS | Performed by: ORTHOPAEDIC SURGERY

## 2020-07-20 PROCEDURE — 6370000000 HC RX 637 (ALT 250 FOR IP): Performed by: ORTHOPAEDIC SURGERY

## 2020-07-20 PROCEDURE — 2709999900 HC NON-CHARGEABLE SUPPLY: Performed by: ORTHOPAEDIC SURGERY

## 2020-07-20 PROCEDURE — 86850 RBC ANTIBODY SCREEN: CPT

## 2020-07-20 PROCEDURE — 97116 GAIT TRAINING THERAPY: CPT

## 2020-07-20 PROCEDURE — 36415 COLL VENOUS BLD VENIPUNCTURE: CPT

## 2020-07-20 PROCEDURE — 6360000002 HC RX W HCPCS: Performed by: NURSE ANESTHETIST, CERTIFIED REGISTERED

## 2020-07-20 PROCEDURE — C1713 ANCHOR/SCREW BN/BN,TIS/BN: HCPCS | Performed by: ORTHOPAEDIC SURGERY

## 2020-07-20 PROCEDURE — 3700000000 HC ANESTHESIA ATTENDED CARE: Performed by: ORTHOPAEDIC SURGERY

## 2020-07-20 PROCEDURE — 82947 ASSAY GLUCOSE BLOOD QUANT: CPT

## 2020-07-20 PROCEDURE — 83036 HEMOGLOBIN GLYCOSYLATED A1C: CPT

## 2020-07-20 PROCEDURE — 86900 BLOOD TYPING SEROLOGIC ABO: CPT

## 2020-07-20 PROCEDURE — 73560 X-RAY EXAM OF KNEE 1 OR 2: CPT

## 2020-07-20 PROCEDURE — 3E0T3BZ INTRODUCTION OF ANESTHETIC AGENT INTO PERIPHERAL NERVES AND PLEXI, PERCUTANEOUS APPROACH: ICD-10-PCS | Performed by: ANESTHESIOLOGY

## 2020-07-20 PROCEDURE — 2500000003 HC RX 250 WO HCPCS: Performed by: NURSE ANESTHETIST, CERTIFIED REGISTERED

## 2020-07-20 PROCEDURE — 3600000005 HC SURGERY LEVEL 5 BASE: Performed by: ORTHOPAEDIC SURGERY

## 2020-07-20 PROCEDURE — 2500000003 HC RX 250 WO HCPCS: Performed by: ANESTHESIOLOGY

## 2020-07-20 PROCEDURE — 1210000000 HC MED SURG R&B

## 2020-07-20 DEVICE — Z  INACTIVE USE 2750024 CEMENT BONE 40GM W/ GENTMYCN HI VISC PALACOS R+G: Type: IMPLANTABLE DEVICE | Site: KNEE | Status: FUNCTIONAL

## 2020-07-20 DEVICE — PSN TIB STM 5 DEG SZ F R: Type: IMPLANTABLE DEVICE | Site: KNEE | Status: FUNCTIONAL

## 2020-07-20 DEVICE — COMPONENT FEM SZ 10 NAR R KNEE CO CHROM CEM POST STBL MID: Type: IMPLANTABLE DEVICE | Site: KNEE | Status: FUNCTIONAL

## 2020-07-20 DEVICE — EXTENSION STEM L30MM DIA14MM KNEE TAPR CEM PERSONA: Type: IMPLANTABLE DEVICE | Site: KNEE | Status: FUNCTIONAL

## 2020-07-20 DEVICE — SURFACE ARTC TIB EF FEM 10-11 H10MM RT KNEE VIVACIT-E: Type: IMPLANTABLE DEVICE | Site: KNEE | Status: FUNCTIONAL

## 2020-07-20 DEVICE — IMPL KNEE PSN ALL POLY PAT 38MM: Type: IMPLANTABLE DEVICE | Site: KNEE | Status: FUNCTIONAL

## 2020-07-20 DEVICE — PLUG BNE CEM L POLYETH FOR 14-17MM IM CNL: Type: IMPLANTABLE DEVICE | Site: KNEE | Status: FUNCTIONAL

## 2020-07-20 RX ORDER — ACETAMINOPHEN 500 MG
1000 TABLET ORAL ONCE
Status: COMPLETED | OUTPATIENT
Start: 2020-07-20 | End: 2020-07-20

## 2020-07-20 RX ORDER — TRAMADOL HYDROCHLORIDE 50 MG/1
50 TABLET ORAL EVERY 6 HOURS
Status: DISCONTINUED | OUTPATIENT
Start: 2020-07-20 | End: 2020-07-22 | Stop reason: HOSPADM

## 2020-07-20 RX ORDER — ATORVASTATIN CALCIUM 80 MG/1
80 TABLET, FILM COATED ORAL NIGHTLY
Status: DISCONTINUED | OUTPATIENT
Start: 2020-07-20 | End: 2020-07-22 | Stop reason: HOSPADM

## 2020-07-20 RX ORDER — TAMSULOSIN HYDROCHLORIDE 0.4 MG/1
0.4 CAPSULE ORAL DAILY
Status: DISCONTINUED | OUTPATIENT
Start: 2020-07-20 | End: 2020-07-22 | Stop reason: HOSPADM

## 2020-07-20 RX ORDER — ROPIVACAINE HYDROCHLORIDE 5 MG/ML
INJECTION, SOLUTION EPIDURAL; INFILTRATION; PERINEURAL
Status: COMPLETED | OUTPATIENT
Start: 2020-07-20 | End: 2020-07-20

## 2020-07-20 RX ORDER — ONDANSETRON 2 MG/ML
4 INJECTION INTRAMUSCULAR; INTRAVENOUS EVERY 6 HOURS PRN
Status: DISCONTINUED | OUTPATIENT
Start: 2020-07-20 | End: 2020-07-22 | Stop reason: HOSPADM

## 2020-07-20 RX ORDER — HYDROMORPHONE HYDROCHLORIDE 1 MG/ML
0.5 INJECTION, SOLUTION INTRAMUSCULAR; INTRAVENOUS; SUBCUTANEOUS EVERY 5 MIN PRN
Status: DISCONTINUED | OUTPATIENT
Start: 2020-07-20 | End: 2020-07-20 | Stop reason: HOSPADM

## 2020-07-20 RX ORDER — HYDROMORPHONE HYDROCHLORIDE 1 MG/ML
1 INJECTION, SOLUTION INTRAMUSCULAR; INTRAVENOUS; SUBCUTANEOUS
Status: DISCONTINUED | OUTPATIENT
Start: 2020-07-20 | End: 2020-07-20

## 2020-07-20 RX ORDER — MEPERIDINE HYDROCHLORIDE 50 MG/ML
12.5 INJECTION INTRAMUSCULAR; INTRAVENOUS; SUBCUTANEOUS EVERY 5 MIN PRN
Status: DISCONTINUED | OUTPATIENT
Start: 2020-07-20 | End: 2020-07-20 | Stop reason: HOSPADM

## 2020-07-20 RX ORDER — METOCLOPRAMIDE HYDROCHLORIDE 5 MG/ML
10 INJECTION INTRAMUSCULAR; INTRAVENOUS
Status: DISCONTINUED | OUTPATIENT
Start: 2020-07-20 | End: 2020-07-20 | Stop reason: HOSPADM

## 2020-07-20 RX ORDER — OXYCODONE HCL 10 MG/1
10 TABLET, FILM COATED, EXTENDED RELEASE ORAL ONCE
Status: COMPLETED | OUTPATIENT
Start: 2020-07-20 | End: 2020-07-20

## 2020-07-20 RX ORDER — HYDROMORPHONE HYDROCHLORIDE 1 MG/ML
0.5 INJECTION, SOLUTION INTRAMUSCULAR; INTRAVENOUS; SUBCUTANEOUS
Status: DISCONTINUED | OUTPATIENT
Start: 2020-07-20 | End: 2020-07-22 | Stop reason: HOSPADM

## 2020-07-20 RX ORDER — SODIUM CHLORIDE 0.9 % (FLUSH) 0.9 %
10 SYRINGE (ML) INJECTION EVERY 12 HOURS SCHEDULED
Status: DISCONTINUED | OUTPATIENT
Start: 2020-07-20 | End: 2020-07-20 | Stop reason: HOSPADM

## 2020-07-20 RX ORDER — OXYCODONE HYDROCHLORIDE 5 MG/1
10 TABLET ORAL EVERY 4 HOURS PRN
Status: DISCONTINUED | OUTPATIENT
Start: 2020-07-20 | End: 2020-07-22 | Stop reason: HOSPADM

## 2020-07-20 RX ORDER — SODIUM CHLORIDE 0.9 % (FLUSH) 0.9 %
10 SYRINGE (ML) INJECTION EVERY 12 HOURS SCHEDULED
Status: DISCONTINUED | OUTPATIENT
Start: 2020-07-20 | End: 2020-07-22 | Stop reason: HOSPADM

## 2020-07-20 RX ORDER — MORPHINE SULFATE 4 MG/ML
2 INJECTION, SOLUTION INTRAMUSCULAR; INTRAVENOUS EVERY 5 MIN PRN
Status: DISCONTINUED | OUTPATIENT
Start: 2020-07-20 | End: 2020-07-20 | Stop reason: HOSPADM

## 2020-07-20 RX ORDER — NICOTINE POLACRILEX 4 MG
15 LOZENGE BUCCAL PRN
Status: DISCONTINUED | OUTPATIENT
Start: 2020-07-20 | End: 2020-07-22 | Stop reason: HOSPADM

## 2020-07-20 RX ORDER — HYDROMORPHONE HYDROCHLORIDE 1 MG/ML
0.25 INJECTION, SOLUTION INTRAMUSCULAR; INTRAVENOUS; SUBCUTANEOUS EVERY 5 MIN PRN
Status: DISCONTINUED | OUTPATIENT
Start: 2020-07-20 | End: 2020-07-20 | Stop reason: HOSPADM

## 2020-07-20 RX ORDER — PROMETHAZINE HYDROCHLORIDE 25 MG/ML
6.25 INJECTION, SOLUTION INTRAMUSCULAR; INTRAVENOUS
Status: DISCONTINUED | OUTPATIENT
Start: 2020-07-20 | End: 2020-07-20 | Stop reason: HOSPADM

## 2020-07-20 RX ORDER — DEXAMETHASONE SODIUM PHOSPHATE 10 MG/ML
10 INJECTION, SOLUTION INTRAMUSCULAR; INTRAVENOUS ONCE
Status: DISCONTINUED | OUTPATIENT
Start: 2020-07-20 | End: 2020-07-22 | Stop reason: HOSPADM

## 2020-07-20 RX ORDER — LIDOCAINE HYDROCHLORIDE 10 MG/ML
1 INJECTION, SOLUTION EPIDURAL; INFILTRATION; INTRACAUDAL; PERINEURAL
Status: COMPLETED | OUTPATIENT
Start: 2020-07-20 | End: 2020-07-20

## 2020-07-20 RX ORDER — SODIUM CHLORIDE 9 MG/ML
INJECTION, SOLUTION INTRAVENOUS CONTINUOUS
Status: DISCONTINUED | OUTPATIENT
Start: 2020-07-20 | End: 2020-07-22 | Stop reason: HOSPADM

## 2020-07-20 RX ORDER — DEXTROSE MONOHYDRATE 25 G/50ML
12.5 INJECTION, SOLUTION INTRAVENOUS PRN
Status: DISCONTINUED | OUTPATIENT
Start: 2020-07-20 | End: 2020-07-22 | Stop reason: HOSPADM

## 2020-07-20 RX ORDER — HYDRALAZINE HYDROCHLORIDE 20 MG/ML
5 INJECTION INTRAMUSCULAR; INTRAVENOUS EVERY 10 MIN PRN
Status: DISCONTINUED | OUTPATIENT
Start: 2020-07-20 | End: 2020-07-20 | Stop reason: HOSPADM

## 2020-07-20 RX ORDER — SODIUM CHLORIDE 0.9 % (FLUSH) 0.9 %
10 SYRINGE (ML) INJECTION PRN
Status: DISCONTINUED | OUTPATIENT
Start: 2020-07-20 | End: 2020-07-20 | Stop reason: HOSPADM

## 2020-07-20 RX ORDER — MIDAZOLAM HYDROCHLORIDE 1 MG/ML
2 INJECTION INTRAMUSCULAR; INTRAVENOUS
Status: COMPLETED | OUTPATIENT
Start: 2020-07-20 | End: 2020-07-20

## 2020-07-20 RX ORDER — CLINDAMYCIN PHOSPHATE 900 MG/50ML
900 INJECTION INTRAVENOUS EVERY 8 HOURS
Status: COMPLETED | OUTPATIENT
Start: 2020-07-20 | End: 2020-07-22

## 2020-07-20 RX ORDER — MIDAZOLAM HYDROCHLORIDE 1 MG/ML
2 INJECTION INTRAMUSCULAR; INTRAVENOUS
Status: DISCONTINUED | OUTPATIENT
Start: 2020-07-20 | End: 2020-07-20 | Stop reason: HOSPADM

## 2020-07-20 RX ORDER — PRASUGREL 10 MG/1
10 TABLET, FILM COATED ORAL DAILY
Status: DISCONTINUED | OUTPATIENT
Start: 2020-07-20 | End: 2020-07-22 | Stop reason: HOSPADM

## 2020-07-20 RX ORDER — DIPHENHYDRAMINE HYDROCHLORIDE 50 MG/ML
12.5 INJECTION INTRAMUSCULAR; INTRAVENOUS
Status: DISCONTINUED | OUTPATIENT
Start: 2020-07-20 | End: 2020-07-20 | Stop reason: HOSPADM

## 2020-07-20 RX ORDER — ONDANSETRON 2 MG/ML
INJECTION INTRAMUSCULAR; INTRAVENOUS PRN
Status: DISCONTINUED | OUTPATIENT
Start: 2020-07-20 | End: 2020-07-20 | Stop reason: SDUPTHER

## 2020-07-20 RX ORDER — SODIUM CHLORIDE, SODIUM LACTATE, POTASSIUM CHLORIDE, CALCIUM CHLORIDE 600; 310; 30; 20 MG/100ML; MG/100ML; MG/100ML; MG/100ML
INJECTION, SOLUTION INTRAVENOUS CONTINUOUS
Status: DISCONTINUED | OUTPATIENT
Start: 2020-07-20 | End: 2020-07-22 | Stop reason: HOSPADM

## 2020-07-20 RX ORDER — SODIUM CHLORIDE 0.9 % (FLUSH) 0.9 %
10 SYRINGE (ML) INJECTION PRN
Status: DISCONTINUED | OUTPATIENT
Start: 2020-07-20 | End: 2020-07-22 | Stop reason: HOSPADM

## 2020-07-20 RX ORDER — DIAZEPAM 5 MG/1
5 TABLET ORAL EVERY 6 HOURS PRN
Status: DISCONTINUED | OUTPATIENT
Start: 2020-07-20 | End: 2020-07-22 | Stop reason: HOSPADM

## 2020-07-20 RX ORDER — FENTANYL CITRATE 50 UG/ML
50 INJECTION, SOLUTION INTRAMUSCULAR; INTRAVENOUS
Status: COMPLETED | OUTPATIENT
Start: 2020-07-20 | End: 2020-07-20

## 2020-07-20 RX ORDER — 0.9 % SODIUM CHLORIDE 0.9 %
500 INTRAVENOUS SOLUTION INTRAVENOUS PRN
Status: DISCONTINUED | OUTPATIENT
Start: 2020-07-20 | End: 2020-07-22 | Stop reason: HOSPADM

## 2020-07-20 RX ORDER — LABETALOL HYDROCHLORIDE 5 MG/ML
5 INJECTION, SOLUTION INTRAVENOUS EVERY 10 MIN PRN
Status: DISCONTINUED | OUTPATIENT
Start: 2020-07-20 | End: 2020-07-20 | Stop reason: HOSPADM

## 2020-07-20 RX ORDER — PREGABALIN 75 MG/1
75 CAPSULE ORAL ONCE
Status: COMPLETED | OUTPATIENT
Start: 2020-07-20 | End: 2020-07-20

## 2020-07-20 RX ORDER — OXYCODONE HYDROCHLORIDE 5 MG/1
20 TABLET ORAL EVERY 4 HOURS PRN
Status: DISCONTINUED | OUTPATIENT
Start: 2020-07-20 | End: 2020-07-22 | Stop reason: HOSPADM

## 2020-07-20 RX ORDER — DEXAMETHASONE SODIUM PHOSPHATE 4 MG/ML
INJECTION, SOLUTION INTRA-ARTICULAR; INTRALESIONAL; INTRAMUSCULAR; INTRAVENOUS; SOFT TISSUE PRN
Status: DISCONTINUED | OUTPATIENT
Start: 2020-07-20 | End: 2020-07-20 | Stop reason: SDUPTHER

## 2020-07-20 RX ORDER — METOPROLOL SUCCINATE 25 MG/1
25 TABLET, EXTENDED RELEASE ORAL DAILY
Status: DISCONTINUED | OUTPATIENT
Start: 2020-07-21 | End: 2020-07-22 | Stop reason: HOSPADM

## 2020-07-20 RX ORDER — HYDROMORPHONE HYDROCHLORIDE 1 MG/ML
1 INJECTION, SOLUTION INTRAMUSCULAR; INTRAVENOUS; SUBCUTANEOUS
Status: DISCONTINUED | OUTPATIENT
Start: 2020-07-20 | End: 2020-07-22 | Stop reason: HOSPADM

## 2020-07-20 RX ORDER — DEXTROSE MONOHYDRATE 50 MG/ML
100 INJECTION, SOLUTION INTRAVENOUS PRN
Status: DISCONTINUED | OUTPATIENT
Start: 2020-07-20 | End: 2020-07-22 | Stop reason: HOSPADM

## 2020-07-20 RX ORDER — POLYETHYLENE GLYCOL 3350 17 G/17G
17 POWDER, FOR SOLUTION ORAL DAILY
Status: DISCONTINUED | OUTPATIENT
Start: 2020-07-20 | End: 2020-07-22 | Stop reason: HOSPADM

## 2020-07-20 RX ORDER — HYDROMORPHONE HYDROCHLORIDE 1 MG/ML
2 INJECTION, SOLUTION INTRAMUSCULAR; INTRAVENOUS; SUBCUTANEOUS
Status: DISCONTINUED | OUTPATIENT
Start: 2020-07-20 | End: 2020-07-20

## 2020-07-20 RX ORDER — MORPHINE SULFATE 4 MG/ML
4 INJECTION, SOLUTION INTRAMUSCULAR; INTRAVENOUS EVERY 5 MIN PRN
Status: DISCONTINUED | OUTPATIENT
Start: 2020-07-20 | End: 2020-07-20 | Stop reason: HOSPADM

## 2020-07-20 RX ORDER — DIPHENHYDRAMINE HCL 25 MG
25 TABLET ORAL EVERY 6 HOURS PRN
Status: DISCONTINUED | OUTPATIENT
Start: 2020-07-20 | End: 2020-07-22 | Stop reason: HOSPADM

## 2020-07-20 RX ORDER — SENNA AND DOCUSATE SODIUM 50; 8.6 MG/1; MG/1
1 TABLET, FILM COATED ORAL 2 TIMES DAILY
Status: DISCONTINUED | OUTPATIENT
Start: 2020-07-20 | End: 2020-07-22 | Stop reason: HOSPADM

## 2020-07-20 RX ORDER — TRANEXAMIC ACID 100 MG/ML
INJECTION, SOLUTION INTRAVENOUS PRN
Status: DISCONTINUED | OUTPATIENT
Start: 2020-07-20 | End: 2020-07-20 | Stop reason: SDUPTHER

## 2020-07-20 RX ORDER — ASPIRIN 81 MG/1
81 TABLET ORAL 2 TIMES DAILY
Status: DISCONTINUED | OUTPATIENT
Start: 2020-07-20 | End: 2020-07-22 | Stop reason: HOSPADM

## 2020-07-20 RX ORDER — EPHEDRINE SULFATE 50 MG/ML
INJECTION, SOLUTION INTRAVENOUS PRN
Status: DISCONTINUED | OUTPATIENT
Start: 2020-07-20 | End: 2020-07-20 | Stop reason: SDUPTHER

## 2020-07-20 RX ORDER — OXYCODONE HCL 10 MG/1
10 TABLET, FILM COATED, EXTENDED RELEASE ORAL EVERY 12 HOURS SCHEDULED
Status: DISCONTINUED | OUTPATIENT
Start: 2020-07-20 | End: 2020-07-22 | Stop reason: HOSPADM

## 2020-07-20 RX ORDER — ROCURONIUM BROMIDE 10 MG/ML
INJECTION, SOLUTION INTRAVENOUS PRN
Status: DISCONTINUED | OUTPATIENT
Start: 2020-07-20 | End: 2020-07-20 | Stop reason: SDUPTHER

## 2020-07-20 RX ORDER — PROPOFOL 10 MG/ML
INJECTION, EMULSION INTRAVENOUS PRN
Status: DISCONTINUED | OUTPATIENT
Start: 2020-07-20 | End: 2020-07-20 | Stop reason: SDUPTHER

## 2020-07-20 RX ORDER — SCOLOPAMINE TRANSDERMAL SYSTEM 1 MG/1
1 PATCH, EXTENDED RELEASE TRANSDERMAL ONCE
Status: DISCONTINUED | OUTPATIENT
Start: 2020-07-20 | End: 2020-07-20 | Stop reason: HOSPADM

## 2020-07-20 RX ORDER — ACETAMINOPHEN 325 MG/1
650 TABLET ORAL EVERY 6 HOURS
Status: DISCONTINUED | OUTPATIENT
Start: 2020-07-20 | End: 2020-07-22 | Stop reason: HOSPADM

## 2020-07-20 RX ORDER — CELECOXIB 200 MG/1
200 CAPSULE ORAL ONCE
Status: COMPLETED | OUTPATIENT
Start: 2020-07-20 | End: 2020-07-20

## 2020-07-20 RX ORDER — MORPHINE SULFATE 4 MG/ML
4 INJECTION, SOLUTION INTRAMUSCULAR; INTRAVENOUS
Status: DISCONTINUED | OUTPATIENT
Start: 2020-07-20 | End: 2020-07-20 | Stop reason: HOSPADM

## 2020-07-20 RX ADMIN — DEXAMETHASONE SODIUM PHOSPHATE 8 MG: 4 INJECTION, SOLUTION INTRAMUSCULAR; INTRAVENOUS at 07:20

## 2020-07-20 RX ADMIN — ACETAMINOPHEN 650 MG: 325 TABLET, FILM COATED ORAL at 22:59

## 2020-07-20 RX ADMIN — POLYETHYLENE GLYCOL 3350 17 G: 17 POWDER, FOR SOLUTION ORAL at 15:40

## 2020-07-20 RX ADMIN — TRAMADOL HYDROCHLORIDE 50 MG: 50 TABLET, FILM COATED ORAL at 16:18

## 2020-07-20 RX ADMIN — Medication 2 G: at 15:41

## 2020-07-20 RX ADMIN — ONDANSETRON HYDROCHLORIDE 8 MG: 2 INJECTION, SOLUTION INTRAMUSCULAR; INTRAVENOUS at 08:42

## 2020-07-20 RX ADMIN — ROCURONIUM BROMIDE 50 MG: 10 INJECTION, SOLUTION INTRAVENOUS at 07:10

## 2020-07-20 RX ADMIN — ATORVASTATIN CALCIUM 80 MG: 80 TABLET, FILM COATED ORAL at 20:08

## 2020-07-20 RX ADMIN — Medication 2 G: at 07:18

## 2020-07-20 RX ADMIN — SODIUM CHLORIDE, POTASSIUM CHLORIDE, SODIUM LACTATE AND CALCIUM CHLORIDE: 600; 310; 30; 20 INJECTION, SOLUTION INTRAVENOUS at 06:08

## 2020-07-20 RX ADMIN — ACETAMINOPHEN 1000 MG: 500 TABLET, FILM COATED ORAL at 06:07

## 2020-07-20 RX ADMIN — DIAZEPAM 5 MG: 5 TABLET ORAL at 23:46

## 2020-07-20 RX ADMIN — ASPIRIN 81 MG: 81 TABLET, COATED ORAL at 20:08

## 2020-07-20 RX ADMIN — TRANEXAMIC ACID 1000 MG: 100 INJECTION, SOLUTION INTRAVENOUS at 07:22

## 2020-07-20 RX ADMIN — OXYCODONE HYDROCHLORIDE 10 MG: 10 TABLET, FILM COATED, EXTENDED RELEASE ORAL at 06:07

## 2020-07-20 RX ADMIN — FENTANYL CITRATE 50 MCG: 50 INJECTION INTRAMUSCULAR; INTRAVENOUS at 07:42

## 2020-07-20 RX ADMIN — SODIUM CHLORIDE: 9 INJECTION, SOLUTION INTRAVENOUS at 11:43

## 2020-07-20 RX ADMIN — MIDAZOLAM 2 MG: 1 INJECTION INTRAMUSCULAR; INTRAVENOUS at 06:58

## 2020-07-20 RX ADMIN — ACETAMINOPHEN 650 MG: 325 TABLET, FILM COATED ORAL at 16:18

## 2020-07-20 RX ADMIN — TRAMADOL HYDROCHLORIDE 50 MG: 50 TABLET, FILM COATED ORAL at 11:00

## 2020-07-20 RX ADMIN — OXYCODONE 10 MG: 5 TABLET ORAL at 23:45

## 2020-07-20 RX ADMIN — OXYCODONE 10 MG: 5 TABLET ORAL at 19:21

## 2020-07-20 RX ADMIN — ROPIVACAINE HYDROCHLORIDE 20 ML: 5 INJECTION, SOLUTION EPIDURAL; INFILTRATION; PERINEURAL at 07:02

## 2020-07-20 RX ADMIN — OXYCODONE HYDROCHLORIDE 10 MG: 10 TABLET, FILM COATED, EXTENDED RELEASE ORAL at 20:08

## 2020-07-20 RX ADMIN — PROPOFOL 200 MG: 10 INJECTION, EMULSION INTRAVENOUS at 07:10

## 2020-07-20 RX ADMIN — LIDOCAINE HYDROCHLORIDE 50 MG: 10 INJECTION, SOLUTION EPIDURAL; INFILTRATION; INTRACAUDAL; PERINEURAL at 07:10

## 2020-07-20 RX ADMIN — SODIUM CHLORIDE, PRESERVATIVE FREE 10 ML: 5 INJECTION INTRAVENOUS at 11:02

## 2020-07-20 RX ADMIN — FENTANYL CITRATE 50 MCG: 50 INJECTION INTRAMUSCULAR; INTRAVENOUS at 09:19

## 2020-07-20 RX ADMIN — TRANEXAMIC ACID 1000 MG: 100 INJECTION, SOLUTION INTRAVENOUS at 08:52

## 2020-07-20 RX ADMIN — CLINDAMYCIN IN 5 PERCENT DEXTROSE 900 MG: 18 INJECTION, SOLUTION INTRAVENOUS at 20:07

## 2020-07-20 RX ADMIN — SODIUM CHLORIDE: 9 INJECTION, SOLUTION INTRAVENOUS at 19:11

## 2020-07-20 RX ADMIN — CLINDAMYCIN IN 5 PERCENT DEXTROSE 900 MG: 18 INJECTION, SOLUTION INTRAVENOUS at 11:00

## 2020-07-20 RX ADMIN — CELECOXIB 200 MG: 200 CAPSULE ORAL at 06:07

## 2020-07-20 RX ADMIN — FENTANYL CITRATE 50 MCG: 50 INJECTION INTRAMUSCULAR; INTRAVENOUS at 08:16

## 2020-07-20 RX ADMIN — EPHEDRINE SULFATE 5 MG: 50 INJECTION INTRAMUSCULAR; INTRAVENOUS; SUBCUTANEOUS at 07:17

## 2020-07-20 RX ADMIN — FENTANYL CITRATE 100 MCG: 50 INJECTION INTRAMUSCULAR; INTRAVENOUS at 07:10

## 2020-07-20 RX ADMIN — SUGAMMADEX 200 MG: 100 INJECTION, SOLUTION INTRAVENOUS at 09:07

## 2020-07-20 RX ADMIN — DOCUSATE SODIUM 50 MG AND SENNOSIDES 8.6 MG 1 TABLET: 8.6; 5 TABLET, FILM COATED ORAL at 11:01

## 2020-07-20 RX ADMIN — OXYCODONE 10 MG: 5 TABLET ORAL at 12:54

## 2020-07-20 RX ADMIN — PREGABALIN 75 MG: 75 CAPSULE ORAL at 06:07

## 2020-07-20 RX ADMIN — TAMSULOSIN HYDROCHLORIDE 0.4 MG: 0.4 CAPSULE ORAL at 11:00

## 2020-07-20 RX ADMIN — Medication 2 G: at 22:59

## 2020-07-20 RX ADMIN — ACETAMINOPHEN 650 MG: 325 TABLET, FILM COATED ORAL at 11:00

## 2020-07-20 RX ADMIN — ROCURONIUM BROMIDE 10 MG: 10 INJECTION, SOLUTION INTRAVENOUS at 08:31

## 2020-07-20 RX ADMIN — HYDROMORPHONE HYDROCHLORIDE 0.5 MG: 1 INJECTION, SOLUTION INTRAMUSCULAR; INTRAVENOUS; SUBCUTANEOUS at 11:42

## 2020-07-20 RX ADMIN — DOCUSATE SODIUM 50 MG AND SENNOSIDES 8.6 MG 1 TABLET: 8.6; 5 TABLET, FILM COATED ORAL at 20:08

## 2020-07-20 RX ADMIN — TRAMADOL HYDROCHLORIDE 50 MG: 50 TABLET, FILM COATED ORAL at 22:59

## 2020-07-20 ASSESSMENT — PAIN DESCRIPTION - ONSET
ONSET: ON-GOING
ONSET: ON-GOING
ONSET: GRADUAL
ONSET: ON-GOING
ONSET: ON-GOING

## 2020-07-20 ASSESSMENT — PAIN DESCRIPTION - FREQUENCY
FREQUENCY: CONTINUOUS

## 2020-07-20 ASSESSMENT — PAIN - FUNCTIONAL ASSESSMENT
PAIN_FUNCTIONAL_ASSESSMENT: PREVENTS OR INTERFERES SOME ACTIVE ACTIVITIES AND ADLS
PAIN_FUNCTIONAL_ASSESSMENT: PREVENTS OR INTERFERES SOME ACTIVE ACTIVITIES AND ADLS
PAIN_FUNCTIONAL_ASSESSMENT: 0-10
PAIN_FUNCTIONAL_ASSESSMENT: PREVENTS OR INTERFERES SOME ACTIVE ACTIVITIES AND ADLS
PAIN_FUNCTIONAL_ASSESSMENT: PREVENTS OR INTERFERES SOME ACTIVE ACTIVITIES AND ADLS

## 2020-07-20 ASSESSMENT — LIFESTYLE VARIABLES: SMOKING_STATUS: 0

## 2020-07-20 ASSESSMENT — PAIN DESCRIPTION - PAIN TYPE
TYPE: SURGICAL PAIN

## 2020-07-20 ASSESSMENT — PAIN DESCRIPTION - ORIENTATION
ORIENTATION: RIGHT

## 2020-07-20 ASSESSMENT — PAIN DESCRIPTION - LOCATION
LOCATION: KNEE

## 2020-07-20 ASSESSMENT — PAIN DESCRIPTION - DESCRIPTORS
DESCRIPTORS: ACHING;SORE
DESCRIPTORS: ACHING;SORE;SPASM
DESCRIPTORS: ACHING;SORE
DESCRIPTORS: ACHING;SORE
DESCRIPTORS: ACHING;DISCOMFORT

## 2020-07-20 ASSESSMENT — PAIN SCALES - GENERAL
PAINLEVEL_OUTOF10: 3
PAINLEVEL_OUTOF10: 6
PAINLEVEL_OUTOF10: 9
PAINLEVEL_OUTOF10: 3
PAINLEVEL_OUTOF10: 3
PAINLEVEL_OUTOF10: 4
PAINLEVEL_OUTOF10: 5
PAINLEVEL_OUTOF10: 3
PAINLEVEL_OUTOF10: 6

## 2020-07-20 ASSESSMENT — PAIN DESCRIPTION - PROGRESSION
CLINICAL_PROGRESSION: GRADUALLY IMPROVING
CLINICAL_PROGRESSION: NOT CHANGED

## 2020-07-20 ASSESSMENT — ENCOUNTER SYMPTOMS: SHORTNESS OF BREATH: 0

## 2020-07-20 NOTE — BRIEF OP NOTE
Department of Orthopedic Surgery  Brief Operative Report      Surgeon: Ifeoam Martin    Procedure: Right TKA Complex    Anesthesia:  General endotrachial anesthesia and block    Estimated blood loss:  400 ml    Fluids:1000    TT: 67 minutes    UO: no krzysztof     Drians:  HV x1. See dictated operative report for full details.     Electronically signed by Yamileth Bowser MD on 7/20/20 at 9:10 AM CDT

## 2020-07-20 NOTE — PROGRESS NOTES
History  Social/Functional History  Lives With: Spouse  Type of Home: House  Home Access: Stairs to enter with rails  Bathroom Toilet: Handicap height  ADL Assistance: Independent  Ambulation Assistance: Independent  Transfer Assistance: Independent  Cognition        Objective          AROM RLE (degrees)  RLE General AROM: ABLE TO SIT AND FLEX KNEE TO GROSSLY 80 DEG  AROM LLE (degrees)  LLE AROM : WFL  Strength RLE  Comment: ABLE TO EXTEND KNEE AGAINST GRAVITY  Strength LLE  Strength LLE: WFL        Bed mobility  Sit to Supine: Stand by assistance  Transfers  Sit to Stand: Minimal Assistance  Stand to sit: Contact guard assistance  Ambulation  Ambulation?: Yes  Ambulation 1  Surface: level tile  Device: Rolling Walker  Assistance: Contact guard assistance  Quality of Gait: SLOW, MILDLY ANTALGIC  Gait Deviations: Slow Mallika;Decreased step length;Decreased step height  Distance: 20 FT              Plan   Plan  Times per week: 5-7  Plan weeks: 2 WKS  Current Treatment Recommendations: Strengthening, ROM, Safety Education & Training, Functional Mobility Training, Transfer Training, Pain Management, Gait Training, Endurance Training, Patient/Caregiver Education & Training, Stair training  Plan Comment: WBAT  Safety Devices  Type of devices: Call light within reach, Gait belt, Left in chair    G-Code       OutComes Score                                                  AM-PAC Score             Goals  Short term goals  Time Frame for Short term goals: 2 WKS  Short term goal 1:  FT WITH RW AND SBA  Short term goal 2: STEPS CGA       Therapy Time   Individual Concurrent Group Co-treatment   Time In           Time Out           Minutes                   Jerrell Carrion PT    Electronically signed by Jerrell Carrion PT on 7/20/2020 at 2:48 PM

## 2020-07-20 NOTE — ANESTHESIA PROCEDURE NOTES
Peripheral Block    Patient location during procedure: holding area  Start time: 7/20/2020 7:02 AM  End time: 7/20/2020 7:02 AM  Staffing  Anesthesiologist: Beth White MD  Performed: anesthesiologist   Preanesthetic Checklist  Completed: patient identified, site marked, surgical consent, pre-op evaluation, timeout performed, IV checked, risks and benefits discussed, monitors and equipment checked, anesthesia consent given, oxygen available and patient being monitored  Peripheral Block  Patient position: supine  Prep: ChloraPrep  Patient monitoring: cardiac monitor, continuous pulse ox, frequent blood pressure checks and IV access  Block type: Femoral  Laterality: right  Injection technique: single-shot  Procedures: ultrasound guided  Adductor canal  Provider prep: mask and sterile gloves  Needle  Needle type: short-bevel   Needle gauge: 20 G  Needle length: 10 cm  Needle localization: ultrasound guidance  Assessment  Injection assessment: negative aspiration for heme, no paresthesia on injection and local visualized surrounding nerve on ultrasound  Paresthesia pain: none  Slow fractionated injection: yes  Hemodynamics: stable  Additional Notes  Needle was introduced in proximal third of thigh in an  leonel-medial to posterior direction. The needle was visualized \" in- plane\" under ultrasound guidance to access the adductor canal in a sub-sartorial fashion. The femoral artery was avoided and 20 cc of 0.5% ropivacaine  was injected and surrounded the nerve plexus. The plexus appeared anatomically normal, no obvious pathology was noted.  A permanent image was obtained   Medications Administered  Ropivacaine (NAROPIN) injection 0.5%, 20 mL  Reason for block: post-op pain management

## 2020-07-20 NOTE — OP NOTE
GEORGETTE [facility] [address] [facility]                                OPERATIVE REPORT    PATIENT NAME: Corey Paez                      :        1950  MED REC NO:   [id number]                              ROOM:       [facility]  ACCOUNT NO:   [de-identified]                           ADMIT DATE: 2020  PROVIDER:     Nella Damon MD    DATE OF PROCEDURE:  2020    PREOPERATIVE DIAGNOSIS:  Primary osteoarthritis, right knee with valgus  deformity. POSTOPERATIVE DIAGNOSIS:  Primary osteoarthritis, right knee with valgus  deformity. OPERATION PERFORMED:  Complex primary right total knee replacement. Please note, complexity of the surgery is due to the fact that the  patient uses a semi-constrained prosthesis due to his ligament laxity  medial side. This added 75% increase in difficulty in the case and in  exposure. SURGEON:  Nella Damon MD    FIRST SURGICAL ASSISTANT:  Frederick Palafox PA-C. Please note, he is a  critical assistant in exposure and placement of implants. ANESTHESIA:  General with adductor canal block. EBL:  400 mL. FLUIDS:  1000 mL of crystalloids. TOURNIQUET TIME:  67 minutes. COMPONENTS:  Ab Persona knee system; femur size 10, narrow; tibia  size F with a 14 x 30 extension; polyethylene size 10 CPS polyethylene;  patella size 38. INDICATIONS:  A 22-year-old gentleman with severe arthritis of the right  knee with a valgus deformity. Because of this, he elected for the above  procedure. OPERATIVE PROCEDURE:  After informed consent, he was given 2 gm of  Ancef, 1 gm of tranexamic acid, underwent adductor canal block and  general anesthetic. Tourniquet was placed around the right upper thigh. The leg and knee were prepped and draped in usual sterile fashion. Leg  was esmarched. Tourniquet was inflated to 300 mmHg. His previous  medial incision was used and this was a curvilinear incision. Parapatellar approach was made in the knee. Prepatellar fat pad was  partially excised. Synovium on the distal femur was elevated. The  patient had very tight exposure. He had very large osteophytes and  loose bodies in the suprapatellar pouch which were excised, which did  help with exposure. Synovectomy was performed. Intramedullary canal of  the femur was drilled into. Our distal resection was made in 3 degrees  of valgus taking a +2 cut; medial resection was 12 mm, lateral resection  was just a brush resection. Sized it to a size 10 femur. This was  placed in 5 degrees of external rotation. Anterior, posterior, and  chamfer cuts were made. Posteromedial cut 9 mm, posterolateral cut 4 mm  and the tibia was exposed; 7-degree cutting guide was placed in line  with the anterior tibial crest proximally and the middle of the ankle  distally and tibial resection was made _____ flexion-extension gap. We  spent some time releasing some of the tight lateral structures and also  releasing loose bodies from the posterior recess of the femur. The  tibia was sized to a size F tibia which was placed in line with the  anterior tibial crest.  Box cut and lug holes were made. Trial  reduction showed excellent flexion and extension, some slight laxity  medially so CPS box was cut for. We then _____ the size F tibia with a  stem extension, and a cement restrictor was placed on the tibial canal.   The trial tibia fit very nicely. Patella measured 27 mm in thickness,  the reaming system was used. We sized it to a size 38 mm patella. Lug  holes were drilled and the trial button fit very nicely. We mixed 20 mL  of Exparel with 30 mL of saline and 50 mL of 0.25% Marcaine, injected  the posteromedial capsule with 40 mL and 60 mL in the subcutaneous  tissues. Following this, we then irrigated with 1500 mL of normal  saline, thoroughly dried the bone. Two batches of Palacos G cement were  mixed.   The size F tibia with a 14 x 30 stem extension was cemented  followed by a size 10 narrow femoral component. Trial liner was placed  and the size 38 mm patella was cemented. Once the cement had hardened,  tourniquet was released, hemostasis was achieved. We irrigated with  another 1500 mL of normal saline. Final size 10 CPS polyethylene was  locked in the tibial tray. We had full extension of the knee with  perfect midline tracking of the patella. Hemovac drain was placed. Due  to the fact that the patient is on Efient and on aspirin prior to  surgery and we did have to use some releasing laterally. Parapatellar  approach was closed with interrupted #1 Vicryl suture, 2-0 Vicryl suture  for subcutaneous tissues and 2-0 Vicryl for subcuticular stitch. Prineo  Dermabond and soft dressings were applied. The patient was taken to the  recovery room in stable condition. POSTOPERATIVE PLANS:  1. He will be on a typical total knee arthroplasty protocol. 2.  He will be on 2 doses of Ancef and 6 doses of clindamycin. 3.  We will resume his Efient and we will double up his baby aspirin at  81 mg twice a day for 6 weeks and then he can just go back to his normal  Efient regimen. Please note that his preoperative range of motion to  the right knee was 12 to 95 degrees with a valgus deformity.         Rika Lynch MD    D: 07/20/2020 10:12:00      T: 07/20/2020 11:48:38     [email or web address]  Job#: [id number]     Doc#: [id number]    CC:

## 2020-07-20 NOTE — ANESTHESIA POSTPROCEDURE EVALUATION
Department of Anesthesiology  Postprocedure Note    Patient: Barbi George  MRN: [id number]  YOB: 1950  Date of evaluation: 7/20/2020  Time:  9:40 AM     Procedure Summary     Date:  07/20/20 Room / Location:  [address]    Anesthesia Start:  6297 Anesthesia Stop:  7305    Procedure:  RIGHT TOTAL KNEE REPLACEMENT (Right Knee) Diagnosis:  (m17.11)    Surgeon:  Kathi Rayo MD Responsible Provider:  MARIETTA Brady CRNA    Anesthesia Type:  general, regional ASA Status:  3          Anesthesia Type: general, regional    Vibha Phase I: Vibha Score: 7    Vibha Phase II:      Last vitals: Reviewed and per EMR flowsheets.        Anesthesia Post Evaluation    Patient location during evaluation: PACU  Patient participation: waiting for patient participation  Level of consciousness: sleepy but conscious  Pain score: 0  Airway patency: patent  Nausea & Vomiting: no nausea and no vomiting  Complications: no  Cardiovascular status: hemodynamically stable  Respiratory status: acceptable, spontaneous ventilation and face mask  Hydration status: euvolemic

## 2020-07-20 NOTE — ANESTHESIA PRE PROCEDURE
Department of Anesthesiology  Preprocedure Note       Name:  Poornima Shafer   Age:  71 y.o.  :  1950                                          MRN:  [id number]         Date:  2020      Surgeon: Dm Garcia):  Liliana Ward MD    Procedure: Procedure(s):  RIGHT TOTAL KNEE REPLACEMENT    Medications prior to admission:   Prior to Admission medications    Medication Sig Start Date End Date Taking?  Authorizing Provider   atorvastatin (LIPITOR) 80 MG tablet Take 1 tablet by mouth nightly 20  Yes Callum Brian MD   metoprolol succinate (TOPROL XL) 25 MG extended release tablet Take 1 tablet by mouth daily 6/3/20  Yes Callum Brian MD   metFORMIN (GLUCOPHAGE) 500 MG tablet HOLD METFORMIN FOR [facility] [address] CATHETERIZATION  Patient taking differently: Take 500 mg by mouth daily (with breakfast)  20  Yes Callum Brian MD   losartan-hydrochlorothiazide (HYZAAR) 100-25 MG per tablet Take 1 tablet by mouth daily   Yes Historical Provider, MD   prasugrel (EFFIENT) 10 MG TABS Take 1 tablet by mouth daily 20   MARIETTA Porter - NP   aspirin 81 MG tablet Take 81 mg by mouth daily    Historical Provider, MD   Meloxicam (MOBIC PO) Take 15 mg by mouth daily     Historical Provider, MD   celecoxib (CELEBREX) 200 MG capsule Take 200 mg by mouth 2 times daily    Historical Provider, MD       Current medications:    Current Facility-Administered Medications   Medication Dose Route Frequency Provider Last Rate Last Dose    ceFAZolin (ANCEF) 2 g in 0.9% sodium chloride 50 mL IVPB  2 g Intravenous Once Liliana Ward MD        dexamethasone (PF) (DECADRON) injection 10 mg  10 mg Intravenous Once Liliana Ward MD        lactated ringers infusion   Intravenous Continuous Liliana Ward  mL/hr at 20 4271      midazolam (VERSED) injection 2 mg  2 mg Intravenous Once PRN Sahara Dennison MD           Allergies:  No Known Allergies    Problem List:    Patient Active Problem List Diagnosis Code    Degeneration of intervertebral disc at C4-C5 level M50.321    Cervical myelopathy (HCC) G95.9    CAD (coronary artery disease) I25.10    Abnormal nuclear cardiac imaging test R93.1    Essential hypertension I10    Mixed hyperlipidemia E78.2    Hx of CABG Z95.1    History of coronary artery stent placement Z95.5    Primary osteoarthritis of right knee M17.11       Past Medical History:        Diagnosis Date    CAD (coronary artery disease)     recent stent per violeta    Cervical myelopathy ([facility] 2019    Degeneration of intervertebral disc at C4-C5 level 2019    Diabetes mellitus ([facility]     Hyperlipidemia     Hypertension     Knee pain        Past Surgical History:        Procedure Laterality Date    BACK SURGERY      CARDIAC SURGERY  1999    CABG X 4VLIMA to LAD, SVG to OM, diag 1 and RCA    CERVICAL FUSION N/A 2019    C3-4 C4-5 ACDF performed by Flaquito Figueroa MD at [address] [place] [address] CATH LAB PROCEDURE      KNEE SURGERY Right     scope, age 35ish        Social History:    Social History     Tobacco Use    Smoking status: Former Smoker     Types: Cigarettes     Last attempt to quit: 1995     Years since quittin.3    Smokeless tobacco: Never Used   Substance Use Topics    Alcohol use:  No                                Counseling given: Not Answered      Vital Signs (Current):   Vitals:    20 0600   BP: 131/81   Pulse: 68   Resp: 16   Temp: 97.9 °F (36.6 °C)   TempSrc: Infrared   SpO2: 97%   Weight: 238 lb (108 kg)   Height: 6' (1.829 m)                                              BP Readings from Last 3 Encounters:   20 131/81   20 118/62   20 124/69       NPO Status: Time of last liquid consumption:                         Time of last solid consumption:                         Date of last liquid consumption: 20                        Date of last solid food consumption: 07/19/20    BMI:   Wt Readings from Last 3 Encounters:   07/20/20 238 lb (108 kg)   07/14/20 238 lb (108 kg)   06/29/20 244 lb (110.7 kg)     Body mass index is 32.28 kg/m². CBC:   Lab Results   Component Value Date    WBC 6.5 07/14/2020    RBC 5.20 07/14/2020    HGB 15.2 07/14/2020    HCT 45.9 07/14/2020    MCV 88.3 07/14/2020    RDW 13.5 07/14/2020     07/14/2020       CMP:   Lab Results   Component Value Date     07/14/2020    K 3.5 07/14/2020     07/14/2020    CO2 25 07/14/2020    BUN 22 07/14/2020    CREATININE 0.9 07/14/2020    GFRAA >59 07/14/2020    LABGLOM >60 07/14/2020    GLUCOSE 139 07/14/2020    PROT 7.8 07/14/2020    CALCIUM 10.0 07/14/2020    BILITOT 0.6 07/14/2020    ALKPHOS 58 07/14/2020    AST 25 07/14/2020    ALT 38 07/14/2020       POC Tests:   Recent Labs     07/20/20  0559   POCGLU 140*       Coags:   Lab Results   Component Value Date    PROTIME 13.4 07/14/2020    INR 1.03 07/14/2020    APTT 30.4 07/14/2020       HCG (If Applicable): No results found for: PREGTESTUR, PREGSERUM, HCG, HCGQUANT     ABGs: No results found for: PHART, PO2ART, LOR8SGW, JRN8ZNI, BEART, H5KONETC     Type & Screen (If Applicable):  No results found for: LABABO, LABRH    Drug/Infectious Status (If Applicable):  No results found for: HIV, HEPCAB    COVID-19 Screening (If Applicable):   Lab Results   Component Value Date    COVID19 Not Detected 07/16/2020         Anesthesia Evaluation  Patient summary reviewed and Nursing notes reviewed no history of anesthetic complications:   Airway: Mallampati: I  TM distance: >3 FB   Neck ROM: full  Mouth opening: > = 3 FB Dental: normal exam   (+) upper dentures and lower dentures      Pulmonary:Negative Pulmonary ROS and normal exam  breath sounds clear to auscultation      (-) shortness of breath and not a current smoker          Patient did not smoke on day of surgery.                  Cardiovascular:    (+) hypertension:, CAD: obstructive, CABG/stent:, (-)  angina and  CHF    NYHA Classification: I  ECG reviewed  Rhythm: regular  Rate: normal           Beta Blocker:  Not on Beta Blocker      ROS comment: Stent in June      Neuro/Psych:   Negative Neuro/Psych ROS     (-) seizures, CVA and depression/anxiety             ROS comment: Cervical myelopathy  GI/Hepatic/Renal: Neg GI/Hepatic/Renal ROS       (-) hiatal hernia and GERD       Endo/Other: Negative Endo/Other ROS   (+) Diabetes, blood dyscrasia: anticoagulation therapy, arthritis: OA., . Pt had PAT visit. Abdominal:       Abdomen: soft. Vascular:                                        Anesthesia Plan      general and regional     ASA 3     (No sab given current use of antiocoag )  Induction: intravenous. BIS  MIPS: Postoperative opioids intended and Prophylactic antiemetics administered. Anesthetic plan and risks discussed with patient. Use of blood products discussed with patient whom. Plan discussed with CRNA.     Attending anesthesiologist reviewed and agrees with Pre Eval content              Pako Jin MD   7/20/2020

## 2020-07-20 NOTE — PROGRESS NOTES
Jaya Scruggs arrived to room # [phone]. Presented with: RTK replacement  Mental Status: Patient is oriented, alert and able to concentrate and follow conversation. Vitals:    07/20/20 1106   BP: 117/74   Pulse: 75   Resp: 20   Temp: 96.3 °F (35.7 °C)   SpO2: 94%     Patient safety contract and falls prevention contract reviewed with patient Yes. Oriented Patient and Family to room. Call light within reach. Yes.   Needs, issues or concerns expressed at this time: no.      Electronically signed by Ino Gauthier RN on 7/20/2020 at 11:38 AM

## 2020-07-21 LAB
ANION GAP SERPL CALCULATED.3IONS-SCNC: 12 MMOL/L (ref 7–19)
BUN BLDV-MCNC: 24 MG/DL (ref 8–23)
CALCIUM SERPL-MCNC: 8.2 MG/DL (ref 8.8–10.2)
CHLORIDE BLD-SCNC: 102 MMOL/L (ref 98–111)
CO2: 22 MMOL/L (ref 22–29)
CREAT SERPL-MCNC: 1.1 MG/DL (ref 0.5–1.2)
GFR AFRICAN AMERICAN: >59
GFR NON-AFRICAN AMERICAN: >60
GLUCOSE BLD-MCNC: 124 MG/DL (ref 70–99)
GLUCOSE BLD-MCNC: 150 MG/DL (ref 74–109)
GLUCOSE BLD-MCNC: 154 MG/DL (ref 70–99)
GLUCOSE BLD-MCNC: 159 MG/DL (ref 70–99)
GLUCOSE BLD-MCNC: 161 MG/DL (ref 70–99)
HCT VFR BLD CALC: 31.5 % (ref 42–52)
HEMOGLOBIN: 10.7 G/DL (ref 14–18)
MRSA CULTURE ONLY: NORMAL
PERFORMED ON: ABNORMAL
POTASSIUM REFLEX MAGNESIUM: 4.2 MMOL/L (ref 3.5–5)
SODIUM BLD-SCNC: 136 MMOL/L (ref 136–145)

## 2020-07-21 PROCEDURE — 36415 COLL VENOUS BLD VENIPUNCTURE: CPT

## 2020-07-21 PROCEDURE — 2500000003 HC RX 250 WO HCPCS: Performed by: ORTHOPAEDIC SURGERY

## 2020-07-21 PROCEDURE — 85018 HEMOGLOBIN: CPT

## 2020-07-21 PROCEDURE — 85014 HEMATOCRIT: CPT

## 2020-07-21 PROCEDURE — 97116 GAIT TRAINING THERAPY: CPT

## 2020-07-21 PROCEDURE — 6370000000 HC RX 637 (ALT 250 FOR IP): Performed by: ORTHOPAEDIC SURGERY

## 2020-07-21 PROCEDURE — 80048 BASIC METABOLIC PNL TOTAL CA: CPT

## 2020-07-21 PROCEDURE — 2580000003 HC RX 258: Performed by: ORTHOPAEDIC SURGERY

## 2020-07-21 PROCEDURE — 1210000000 HC MED SURG R&B

## 2020-07-21 PROCEDURE — 82947 ASSAY GLUCOSE BLOOD QUANT: CPT

## 2020-07-21 PROCEDURE — 97530 THERAPEUTIC ACTIVITIES: CPT

## 2020-07-21 RX ADMIN — PRASUGREL 10 MG: 10 TABLET, FILM COATED ORAL at 09:22

## 2020-07-21 RX ADMIN — ASPIRIN 81 MG: 81 TABLET, COATED ORAL at 09:22

## 2020-07-21 RX ADMIN — ACETAMINOPHEN 650 MG: 325 TABLET, FILM COATED ORAL at 09:22

## 2020-07-21 RX ADMIN — OXYCODONE 10 MG: 5 TABLET ORAL at 09:22

## 2020-07-21 RX ADMIN — SODIUM CHLORIDE, PRESERVATIVE FREE 10 ML: 5 INJECTION INTRAVENOUS at 20:15

## 2020-07-21 RX ADMIN — OXYCODONE HYDROCHLORIDE 10 MG: 10 TABLET, FILM COATED, EXTENDED RELEASE ORAL at 09:25

## 2020-07-21 RX ADMIN — POLYETHYLENE GLYCOL 3350 17 G: 17 POWDER, FOR SOLUTION ORAL at 09:22

## 2020-07-21 RX ADMIN — OXYCODONE HYDROCHLORIDE 10 MG: 10 TABLET, FILM COATED, EXTENDED RELEASE ORAL at 20:15

## 2020-07-21 RX ADMIN — CLINDAMYCIN IN 5 PERCENT DEXTROSE 900 MG: 18 INJECTION, SOLUTION INTRAVENOUS at 03:24

## 2020-07-21 RX ADMIN — DOCUSATE SODIUM 50 MG AND SENNOSIDES 8.6 MG 1 TABLET: 8.6; 5 TABLET, FILM COATED ORAL at 20:14

## 2020-07-21 RX ADMIN — ATORVASTATIN CALCIUM 80 MG: 80 TABLET, FILM COATED ORAL at 20:15

## 2020-07-21 RX ADMIN — TRAMADOL HYDROCHLORIDE 50 MG: 50 TABLET, FILM COATED ORAL at 04:30

## 2020-07-21 RX ADMIN — ACETAMINOPHEN 650 MG: 325 TABLET, FILM COATED ORAL at 16:35

## 2020-07-21 RX ADMIN — OXYCODONE 10 MG: 5 TABLET ORAL at 14:50

## 2020-07-21 RX ADMIN — CLINDAMYCIN IN 5 PERCENT DEXTROSE 900 MG: 18 INJECTION, SOLUTION INTRAVENOUS at 12:08

## 2020-07-21 RX ADMIN — OXYCODONE 10 MG: 5 TABLET ORAL at 20:14

## 2020-07-21 RX ADMIN — TRAMADOL HYDROCHLORIDE 50 MG: 50 TABLET, FILM COATED ORAL at 22:50

## 2020-07-21 RX ADMIN — TAMSULOSIN HYDROCHLORIDE 0.4 MG: 0.4 CAPSULE ORAL at 09:23

## 2020-07-21 RX ADMIN — TRAMADOL HYDROCHLORIDE 50 MG: 50 TABLET, FILM COATED ORAL at 09:23

## 2020-07-21 RX ADMIN — ASPIRIN 81 MG: 81 TABLET, COATED ORAL at 20:14

## 2020-07-21 RX ADMIN — CLINDAMYCIN IN 5 PERCENT DEXTROSE 900 MG: 18 INJECTION, SOLUTION INTRAVENOUS at 20:14

## 2020-07-21 RX ADMIN — TRAMADOL HYDROCHLORIDE 50 MG: 50 TABLET, FILM COATED ORAL at 16:35

## 2020-07-21 RX ADMIN — ACETAMINOPHEN 650 MG: 325 TABLET, FILM COATED ORAL at 04:30

## 2020-07-21 RX ADMIN — DOCUSATE SODIUM 50 MG AND SENNOSIDES 8.6 MG 1 TABLET: 8.6; 5 TABLET, FILM COATED ORAL at 09:22

## 2020-07-21 RX ADMIN — METFORMIN HYDROCHLORIDE 500 MG: 500 TABLET ORAL at 09:25

## 2020-07-21 RX ADMIN — OXYCODONE 10 MG: 5 TABLET ORAL at 04:30

## 2020-07-21 RX ADMIN — SODIUM CHLORIDE: 9 INJECTION, SOLUTION INTRAVENOUS at 03:24

## 2020-07-21 RX ADMIN — ACETAMINOPHEN 650 MG: 325 TABLET, FILM COATED ORAL at 22:50

## 2020-07-21 RX ADMIN — METOPROLOL SUCCINATE 25 MG: 25 TABLET, EXTENDED RELEASE ORAL at 09:22

## 2020-07-21 ASSESSMENT — PAIN DESCRIPTION - PROGRESSION
CLINICAL_PROGRESSION: GRADUALLY IMPROVING

## 2020-07-21 ASSESSMENT — PAIN SCALES - GENERAL
PAINLEVEL_OUTOF10: 2
PAINLEVEL_OUTOF10: 8
PAINLEVEL_OUTOF10: 6
PAINLEVEL_OUTOF10: 5
PAINLEVEL_OUTOF10: 3
PAINLEVEL_OUTOF10: 6
PAINLEVEL_OUTOF10: 6
PAINLEVEL_OUTOF10: 5
PAINLEVEL_OUTOF10: 2
PAINLEVEL_OUTOF10: 6
PAINLEVEL_OUTOF10: 7

## 2020-07-21 ASSESSMENT — PAIN - FUNCTIONAL ASSESSMENT
PAIN_FUNCTIONAL_ASSESSMENT: PREVENTS OR INTERFERES SOME ACTIVE ACTIVITIES AND ADLS

## 2020-07-21 ASSESSMENT — PAIN DESCRIPTION - ONSET
ONSET: ON-GOING

## 2020-07-21 ASSESSMENT — PAIN DESCRIPTION - FREQUENCY
FREQUENCY: CONTINUOUS

## 2020-07-21 ASSESSMENT — PAIN DESCRIPTION - ORIENTATION
ORIENTATION: RIGHT

## 2020-07-21 ASSESSMENT — PAIN DESCRIPTION - DESCRIPTORS
DESCRIPTORS: SORE;ACHING
DESCRIPTORS: SORE;ACHING
DESCRIPTORS: ACHING;SORE

## 2020-07-21 ASSESSMENT — PAIN DESCRIPTION - LOCATION
LOCATION: KNEE

## 2020-07-21 ASSESSMENT — PAIN DESCRIPTION - PAIN TYPE
TYPE: SURGICAL PAIN

## 2020-07-21 NOTE — PROGRESS NOTES
Subjective:     Post-Operative Day: 1 Status Post right TKA. Pt is awake and alert. Ox3. Doing well this am.  No new issues. Systemic or Specific Complaints: No Complaints  no nausea and no vomiting Pain 4    Objective:     Patient Vitals for the past 24 hrs:   BP Temp Temp src Pulse Resp SpO2   07/21/20 0733 115/68 98.4 °F (36.9 °C) Temporal 87 16 91 %   07/21/20 0235 119/64 98.1 °F (36.7 °C) Temporal 79 16 94 %   07/20/20 2144 119/62 97.7 °F (36.5 °C) Temporal 81 16 91 %   07/20/20 1825 112/68 97.6 °F (36.4 °C) Temporal 81 16 92 %   07/20/20 1622 113/66 97 °F (36.1 °C) Temporal 76 18 94 %   07/20/20 1350 109/66 97 °F (36.1 °C) Temporal 81 20 94 %   07/20/20 1228 114/70 96.7 °F (35.9 °C) Temporal 81 20 94 %   07/20/20 1143 113/70 96.4 °F (35.8 °C) Temporal 73 20 96 %   07/20/20 1106 117/74 96.3 °F (35.7 °C) Temporal 75 20 94 %   07/20/20 1031 114/73 96.5 °F (35.8 °C) Temporal 84 20 93 %   07/20/20 1020 126/65 -- -- 79 14 95 %   07/20/20 1015 111/68 98.1 °F (36.7 °C) Temporal 79 12 95 %   07/20/20 1010 (!) 112/58 -- -- 80 13 93 %   07/20/20 1005 (!) 104/55 -- -- 79 13 91 %   07/20/20 1000 (!) 102/58 -- -- 83 16 91 %   07/20/20 0955 (!) 100/53 -- -- 88 17 90 %   07/20/20 0950 113/64 -- -- 92 14 93 %   07/20/20 0945 126/69 -- -- 86 14 90 %   07/20/20 0940 135/75 -- -- 92 14 91 %   07/20/20 0936 135/75 97.7 °F (36.5 °C) Temporal 96 12 90 %       General: alert, appears stated age, cooperative, no distress and moderately obese   Exam: Fair active motion to right lower extremity   Wound: Wound clean and dry no evidence of infection. , No Drainage and Wound Intact   Neurovascular: Exam normal     DVT Exam: No evidence of DVT seen on physical exam.   Xray:  right Knee implants in good position       Data Review:  Recent Labs     07/21/20  0624   HGB 10.7*     Recent Labs     07/21/20  0624      K 4.2   CREATININE 1.1     Recent Labs     07/21/20  0624   LABGLOM >60     No results for input(s): INR in the last 72 hours.  Assessment:     Status Post right TKA. Doing well postoperatively. Plan:     Pt will continue with his current nursing cares and therapy as tolerated. Plan for home tomorrow if stable.       Electronically signed by Bry Ortiz PA-C on 7/21/2020 at 7:35 AM

## 2020-07-21 NOTE — CARE COORDINATION
04/02/19     Advance Directive Assessment  Received  04/11/19     [address] Physician Communication Release NPP  Not Received      [facility] Physician Consent and NPP  Not Received      [address] Physician Communication Release NPP  Signed  03/11/20     Text Reminder Consent  Received  03/11/20     [facility] ([facility] Physician Consent and NPP  Signed  03/11/20     Form  Received  05/28/20 05/28/2020 Flu clinic Intake Form    Lab Result Scan  Received  05/30/20  COVID-19    Insurance Card  Not Received  06/01/20  pt states he has no  card    Advance Directive Assessment  Received  06/01/20     Correspondence  Received  07/01/20  Cardiac Clearance    Photo ID  Received  07/14/20     Miscellaneous  Received  07/16/20  flu intake form    Advance Directive Assessment  Received  07/20/20     Lab Result Scan  Received  07/20/20  [facility] Physician Consent and NPP  Signed (Deleted)  03/11/20     Documents for the [facility] Consent Treat/HIPAA  Received  07/14/20     IMM Medicare  Not Received      IMM - Medicare Second Copy Given to Pt       Observation Notification  Not Received      Hospital Consent [address]/ [address] Consent Treat/HIPAA  Received  07/20/20     H&P  Received  07/20/20     Cost Receipt      Jump to Cost Receipt    Admission Information     Current Information     Attending Provider  Admitting Provider  Admission Type  Admission Status    MD Camilo Faustin MD  Elective  Admission (Confirmed)           Admission Date/Time  Discharge Date  Hospital Service  Auth/Cert Status    77/60/86  05:37 AM   Med/Surg  [facility]  Unit  Room/Bed     24 [facility] 121  [id number]             Admission     Complaint    [facility] Account     Name  Acct ID  Class  Status  Primary Coverage    Leonor Colusa  [id number]  Inpatient  Open  MEDICARE - MEDICARE PART A AND B           Guarantor Account (for Hospital Account #[id number])     Name  Relation to Pt  Service Area  Active? Acct Type    Iris Duncan  Self  [employer] [facility]  Yes  Personal/Family    Address  Phone      [address] [place] [address] Road  [phone](Z)             Coverage Information (for Hospital Account [de-identified])     1. [facility] PART A AND B     F/O Payor/Plan  Precert #    MEDICARE/MEDICARE PART A AND B     Subscriber  Subscriber #    TrevonZane churchill    Address  Phone    [address] [facility] [address] [phone]    2.  / FOR LIFE MEDICARE SUPP     F/O Payor/Plan  Precert #    / FOR LIFE MEDICARE SUPP     Subscriber  Subscriber #    Anay Mooneydallin  [id number]    Address  Phone    [address]            Medical Problems   Comment      Last edited by  on  at    [facility] Problem List   Date Reviewed: 7/21/2020      ICD-10-CM  Priority  Class  Noted  POA     Primary osteoarthritis of right knee  M17.11    7/19/2020  Yes    CAD (coronary artery disease)  I25.10    Unknown  Yes    Overview Addendum 6/1/2020  7:22 PM by Lorena Estrada MD    1/1/1999 CABG X 4V LIMA to LAD, SVG to OM, diag 1 and RCA  2/27/2017  SE negative for myocardial ischemia  12/26/2018  Echo  Normal LVFX  3/12/2020  lexiscan Positive for possible diaphragmatic attenuation versus inferior MI,  EF 52%, -1% ischemic myocardium on stress, uninterpretable risk findings, AUC indication 21, AUC score 7, Rhonda Forman MD)   6/1/20  Cath  Severe NVD, inferior hypokinesis, patent LIMA-LAD, patent VG-OM, patent VG-diag, 80% proximal vein graft, 3.0x18 BMS, EF 50%      Essential hypertension  I10    6/29/2020  Yes    Mixed hyperlipidemia  E78.2    6/29/2020  Yes    Hx of CABG  Z95.1    6/29/2020  Yes    Arthritis of knee  M17.10    7/20/2020  Yes    Diabetes mellitus type 2 with peripheral artery disease ([address] (Chronic)  E11.51    7/20/2020  Yes       Non-Hospital Problem List   Date Reviewed: 7/21/2020      ICD-10-CM  Priority  Class Noted    Abnormal nuclear cardiac imaging test  R93.1  High   Unknown    Degeneration of intervertebral disc at C4-C5 level  M50.321    4/11/2019    Cervical myelopathy ([address]  G95.9    4/11/2019    History of coronary artery stent placement  Z95.5    6/29/2020    Overview Signed 6/29/2020  9:08 AM by MARIETTA Uriostegui    6/1/20 Cath  Severe NVD, inferior hypokinesis, patent LIMA-LAD, patent VG-OM, patent VG-diag, 80% proximal vein graft, 3.0x18 BMS, EF 50% - Dr. Rosy Wetzel        Electronically signed by Rudean Kanner, RN on 7/21/2020 at 10:13 AM

## 2020-07-21 NOTE — CONSULTS
Referring Provider: Dr. Alka Mendez  Reason for Consultation: Medical management    Patient Care Team:  Dav Dalton MD as PCP - General (Family Medicine)  Dav Dalton MD as PCP - [facility] Empaneled Provider  MARIETTA Trevino as Nurse Practitioner (Certified Nurse Specialist)    Chief complaint right knee pain    Subjective . History of present illness: The patient presents to the orthopedic service for TKA. They have failed outpatient conservative treatment of NSAIDS, muscle relaxer, physical therapy and opioid pain meds. The pain is affecting their activities of daily living and they have chosen to undergo surgical correction. We have been asked to provide medical consultation by the attending physician since their primary care provider does not attend here at [address] [place] [address] in their healthcare during the perioperative phase was discussed with the patient. All questions were encouraged and answered to the best of our ability. The postoperative pain is as expected. There are no other participating or relieving factors noted.       REVIEW OF SYSTEMS:    CONSTITUTIONAL:  Negative for anorexia, chills, fevers, night sweats and weight loss  EYES:  negative for eye dryness, icterus and redness  HEENT:   negative for dental problems, epistaxis, facial trauma and thrush  RESPIRATORY:  negative for chest tightness, cough, dyspnea on exertion, pneumonia and sputum  CARDIOVASCULAR: negative for chest pain, dyspnea, exertional chest pressure/discomfort, irregular heart beat, palpitations, paroxysmal nocturnal dyspnea and syncope  GASTROINTESTINAL:  negative for abdominal pain, hematemesis, jaundice, melena and rectal bleeding  MUSCULOSKELETAL:  negative for muscle weakness, myalgias and neck pain, chronic joint pain noted  NEUROLOGICAL:   negative for dizziness, headaches, seizures, speech problems, tremors and vertigo  INTEGUMENT: negative for pruritus, rash, skin color change and skin lesion(s)   A Full 14 point review of systems is negative outside those listed above and in the HPI      History    Past Medical History:   Diagnosis Date    CAD (coronary artery disease)     recent stent per violeta    Cervical myelopathy ([facility] 2019    Degeneration of intervertebral disc at C4-C5 level 2019    Diabetes mellitus ([facility] [address]     Hyperlipidemia     Hypertension     Knee pain      Past Surgical History:   Procedure Laterality Date    BACK SURGERY      CARDIAC SURGERY  1999    CABG X 4VLIMA to LAD, SVG to OM, diag 1 and RCA    CERVICAL FUSION N/A 2019    C3-4 C4-5 ACDF performed by Nikita Glez MD at [address] [place] [address] CATH LAB PROCEDURE      KNEE SURGERY Right     scope, age 35ish    [facility] TOTAL KNEE ARTHROPLASTY Right 2020    RIGHT TOTAL KNEE REPLACEMENT performed by Rance Koyanagi, MD at [facility] History   Problem Relation Age of Onset    Heart Disease Mother     Diabetes Mother     Cancer Father         LUNG CA     Social History     Tobacco Use    Smoking status: Former Smoker     Types: Cigarettes     Last attempt to quit: 1995     Years since quittin.3    Smokeless tobacco: Never Used   Substance Use Topics    Alcohol use: No    Drug use: No     Medications Prior to Admission: atorvastatin (LIPITOR) 80 MG tablet, Take 1 tablet by mouth nightly  metoprolol succinate (TOPROL XL) 25 MG extended release tablet, Take 1 tablet by mouth daily  metFORMIN (GLUCOPHAGE) 500 MG tablet, HOLD METFORMIN FOR 48 HOURS AFTER THE CARDIAC CATHETERIZATION (Patient taking differently: Take 500 mg by mouth daily (with breakfast) )  losartan-hydrochlorothiazide (HYZAAR) 100-25 MG per tablet, Take 1 tablet by mouth daily  [DISCONTINUED] metFORMIN (GLUCOPHAGE) 500 MG tablet, Take 500 mg by mouth daily (with breakfast)  prasugrel (EFFIENT) 10 MG TABS, Take 1 tablet by mouth daily  aspirin 81 MG tablet, Take 81 mg by mouth daily  Meloxicam (MOBIC PO), Take 15 mg by mouth daily   celecoxib (CELEBREX) 200 MG capsule, Take 200 mg by mouth 2 times daily  Patient has no known allergies. Objective     Vital Signs   All pre-op and post op vitals reviewed           Physical Exam:  Constitutional: oriented to person, place, and time. appears well-developed. HEENT:   Head: Normocephalic and atraumatic. Eyes: Pupils are equal, round, and reactive to light. Neck: Neck supple. Cardiovascular: Regular rhythm and normal heart sounds. No lift or rub appreciated. Pulmonary/Chest: Effort normal and breath sounds normal. CTAB. No labored breating. Abdominal: Soft. Bowel sounds are normal. There is no appreciable  distension. There is no point tenderness. no rebounding or guarding. Musculoskeletal: Normal range of motion on other than surgically repaired joint . no edema or tenderness other than surgical area. Post-op changes noted  Neurological:  alert and oriented to person, place, and time. normal reflexes. No focal deficits  Skin: Skin is warm and dry. No new rashes appreciated. Results Review:   I reviewed the patient's new imaging results and agree with the interpretation. Active Problems: Treatment recommendations    CAD (coronary artery disease)    Essential hypertension--Hyzaar, metoprolol    Mixed hyperlipidemia--continue with Lipitor    Hx of CABG    Primary osteoarthritis of right knee  Acute postoperative blood loss anemia--stable, expected, being monitored with daily labs    Diabetes mellitus type 2 with peripheral artery disease--metformin    Medically stable postop day #1  Encourage incentive spirometry every 1 hour while awake  Wound care monitor for infection  Early mobilization, maximize efforts with therapy    Anemia post-op expected-check iron, B12,and folate if indicated. Replenish substrates as needed. Transfuse at acceptable levels depending on clinical judgement and comorbidities.  Recent hospital policy recommends 1 unit at a time. Recheck hemoglobin in AM if patient remains in house, if D/C ed advise close follow up with PCP to ensure levels return to baseline. Hypertension-review pre and post BP's,will restart home BP meds when BP allows. Add Clonidine 0.1 mg q 4 hours prn if bp> 140/90,monitor BP and adjust meds as necessary. Blood sugars noted. Explained to patient the need for better control to optimize the healing process. Reviewed home medication regimen, IV fluids, and dietary intake over the last 24 hours to help determine the etiology of the hyperglycemia. Adjustments made and discussed with staff, see orders for further details. I discussed the patients findings and my recommendations with patient/family, staff and the Orthopaedic team.  Divine Lovehty  07/21/20  7:19 AM    Patient well-known- excellent control of type 2 diabetes with preop hemoglobin A1c 6.2. Looks great postop day #1. Start PT OT. Plan discharge home 1-2 days. I have discussed the care of Juan Alberto Patrick, including pertinent history and exam findings with the ARNP/PA. I have seen and examined the patient and the key elements of all parts of the encounter have been performed by me. I agree with the assessment and plan as outlined by the ARNP/PA. Please refer to my separate note for complete documentation.      Electronically signed by Lee Ann Lucero MD on 7/21/2020 at 8:16 AM

## 2020-07-21 NOTE — PROGRESS NOTES
Physical Therapy  Name: Barbi George  MRN:  [id number]  Date of service:  7/21/2020 07/21/20 1039   Subjective   Subjective Pt states he is ready to walk   Bed Mobility   Supine to Sit Stand by assistance   Scooting Stand by assistance   Transfers   Sit to Stand Stand by assistance   Stand to sit Stand by assistance   Ambulation   Ambulation? Yes   Ambulation 1   Surface level tile   Device Rolling Walker   Assistance Contact guard assistance;Stand by assistance   Gait Deviations Slow Mallika;Decreased step length;Decreased step height   Distance 60', 10'   Comments Pt was able to amb in jensen and into restroom to stand and urinate and brush his teeth. back to recliner with fresh ice pack   Exercises   Straight Leg Raise 10x   Quad Sets 10x   Ankle Pumps 10x'   Short term goals   Time Frame for Short term goals 2 WKS   Short term goal 1  FT WITH RW AND SBA   Short term goal 2 STEPS CGA   Conditions Requiring Skilled Therapeutic Intervention   Body structures, Functions, Activity limitations Decreased functional mobility ; Decreased strength;Decreased ROM; Increased pain;Decreased endurance   Assessment Pt doing well this morning with increased amb distance and endurange.     Activity Tolerance   Activity Tolerance Patient Tolerated treatment well   Safety Devices   Type of devices Left in chair;Call light within reach         Electronically signed by Diane Coyle PTA on 7/21/2020 at 10:42 AM

## 2020-07-21 NOTE — PROGRESS NOTES
Physical Therapy  Name: Antonietta Jones  MRN:  [id number]  Date of service:  7/21/2020 07/21/20 1501   Subjective   Subjective Pt agrees to walk again. Bed Mobility   Supine to Sit Supervision   Sit to Supine Supervision   Transfers   Sit to Stand Stand by assistance   Stand to sit Stand by assistance   Ambulation   Ambulation? Yes   Ambulation 1   Surface level tile   Device Rolling Walker   Assistance Contact guard assistance;Stand by assistance   Gait Deviations Slow Mallika;Decreased step length;Decreased step height   Distance 76'   Comments Pt is doing well with transfers and amb. Short term goals   Time Frame for Short term goals 2 WKS   Short term goal 1  FT WITH RW AND SBA   Short term goal 2 STEPS CGA   Conditions Requiring Skilled Therapeutic Intervention   Body structures, Functions, Activity limitations Decreased functional mobility ; Decreased strength;Decreased ROM; Increased pain;Decreased endurance   Assessment Pt SBA for most activities at this point. Activity Tolerance   Activity Tolerance Patient Tolerated treatment well   Safety Devices   Type of devices Left in bed;Call light within reach; Bed alarm in place       Electronically signed by Feliberto Newberry PTA on 7/21/2020 at 3:03 PM

## 2020-07-21 NOTE — CARE COORDINATION
Spoke with patient regarding MD orders for [facility] services. Patient agreeable and has chosen [facility]. Referral Faxed. [address] [place] [address] [phone]. [phone]. Please notify [address] [place] [address] when patient discharges and fax DC Summary,  DC med list and any new [facility] orders. The Patient was provided with a choice of provider and agrees with the discharge plan. [x] Yes [] No    Freedom of choice list was provided with basic dialogue that supports the patient's individualized plan of care/goals, treatment preferences and shares the quality data associated with the providers.  [x] Yes [] No  Electronically signed by Kelly Khan RN on 7/21/2020 at 5:14 PM

## 2020-07-22 VITALS
OXYGEN SATURATION: 91 % | HEART RATE: 88 BPM | BODY MASS INDEX: 32.23 KG/M2 | DIASTOLIC BLOOD PRESSURE: 58 MMHG | WEIGHT: 238 LBS | TEMPERATURE: 98.4 F | SYSTOLIC BLOOD PRESSURE: 114 MMHG | HEIGHT: 72 IN | RESPIRATION RATE: 20 BRPM

## 2020-07-22 LAB
ANION GAP SERPL CALCULATED.3IONS-SCNC: 14 MMOL/L (ref 7–19)
BUN BLDV-MCNC: 20 MG/DL (ref 8–23)
CALCIUM SERPL-MCNC: 8.2 MG/DL (ref 8.8–10.2)
CHLORIDE BLD-SCNC: 99 MMOL/L (ref 98–111)
CO2: 23 MMOL/L (ref 22–29)
CREAT SERPL-MCNC: 1 MG/DL (ref 0.5–1.2)
GFR AFRICAN AMERICAN: >59
GFR NON-AFRICAN AMERICAN: >60
GLUCOSE BLD-MCNC: 139 MG/DL (ref 74–109)
GLUCOSE BLD-MCNC: 151 MG/DL (ref 70–99)
HCT VFR BLD CALC: 29.8 % (ref 42–52)
HEMOGLOBIN: 9.7 G/DL (ref 14–18)
IRON SATURATION: 18 % (ref 14–50)
IRON: 38 UG/DL (ref 59–158)
PERFORMED ON: ABNORMAL
POTASSIUM REFLEX MAGNESIUM: 4.1 MMOL/L (ref 3.5–5)
SODIUM BLD-SCNC: 136 MMOL/L (ref 136–145)
TOTAL IRON BINDING CAPACITY: 208 UG/DL (ref 250–400)
VITAMIN B-12: 264 PG/ML (ref 211–946)

## 2020-07-22 PROCEDURE — 97116 GAIT TRAINING THERAPY: CPT

## 2020-07-22 PROCEDURE — 36415 COLL VENOUS BLD VENIPUNCTURE: CPT

## 2020-07-22 PROCEDURE — 2500000003 HC RX 250 WO HCPCS: Performed by: ORTHOPAEDIC SURGERY

## 2020-07-22 PROCEDURE — 85018 HEMOGLOBIN: CPT

## 2020-07-22 PROCEDURE — 83550 IRON BINDING TEST: CPT

## 2020-07-22 PROCEDURE — 80048 BASIC METABOLIC PNL TOTAL CA: CPT

## 2020-07-22 PROCEDURE — 85014 HEMATOCRIT: CPT

## 2020-07-22 PROCEDURE — 2580000003 HC RX 258: Performed by: ORTHOPAEDIC SURGERY

## 2020-07-22 PROCEDURE — 6370000000 HC RX 637 (ALT 250 FOR IP): Performed by: PHYSICIAN ASSISTANT

## 2020-07-22 PROCEDURE — 82947 ASSAY GLUCOSE BLOOD QUANT: CPT

## 2020-07-22 PROCEDURE — 83540 ASSAY OF IRON: CPT

## 2020-07-22 PROCEDURE — 82607 VITAMIN B-12: CPT

## 2020-07-22 PROCEDURE — 6370000000 HC RX 637 (ALT 250 FOR IP): Performed by: ORTHOPAEDIC SURGERY

## 2020-07-22 RX ORDER — FERROUS SULFATE 325(65) MG
325 TABLET ORAL
Status: DISCONTINUED | OUTPATIENT
Start: 2020-07-22 | End: 2020-07-22 | Stop reason: HOSPADM

## 2020-07-22 RX ORDER — ASPIRIN 81 MG/1
81 TABLET ORAL 2 TIMES DAILY
Qty: 80 TABLET | Refills: 0 | Status: SHIPPED | OUTPATIENT
Start: 2020-07-22 | End: 2020-12-22

## 2020-07-22 RX ORDER — OXYCODONE HYDROCHLORIDE 5 MG/1
5 TABLET ORAL EVERY 4 HOURS PRN
Qty: 60 TABLET | Refills: 0
Start: 2020-07-22 | End: 2020-08-21

## 2020-07-22 RX ADMIN — OXYCODONE 10 MG: 5 TABLET ORAL at 05:04

## 2020-07-22 RX ADMIN — TRAMADOL HYDROCHLORIDE 50 MG: 50 TABLET, FILM COATED ORAL at 05:04

## 2020-07-22 RX ADMIN — OXYCODONE 10 MG: 5 TABLET ORAL at 01:10

## 2020-07-22 RX ADMIN — TAMSULOSIN HYDROCHLORIDE 0.4 MG: 0.4 CAPSULE ORAL at 09:26

## 2020-07-22 RX ADMIN — ASPIRIN 81 MG: 81 TABLET, COATED ORAL at 09:26

## 2020-07-22 RX ADMIN — ACETAMINOPHEN 650 MG: 325 TABLET, FILM COATED ORAL at 05:04

## 2020-07-22 RX ADMIN — DOCUSATE SODIUM 50 MG AND SENNOSIDES 8.6 MG 1 TABLET: 8.6; 5 TABLET, FILM COATED ORAL at 09:26

## 2020-07-22 RX ADMIN — FERROUS SULFATE TAB 325 MG (65 MG ELEMENTAL FE) 325 MG: 325 (65 FE) TAB at 09:33

## 2020-07-22 RX ADMIN — POLYETHYLENE GLYCOL 3350 17 G: 17 POWDER, FOR SOLUTION ORAL at 09:26

## 2020-07-22 RX ADMIN — CLINDAMYCIN IN 5 PERCENT DEXTROSE 900 MG: 18 INJECTION, SOLUTION INTRAVENOUS at 04:16

## 2020-07-22 RX ADMIN — OXYCODONE HYDROCHLORIDE 10 MG: 10 TABLET, FILM COATED, EXTENDED RELEASE ORAL at 09:26

## 2020-07-22 RX ADMIN — SODIUM CHLORIDE, PRESERVATIVE FREE 10 ML: 5 INJECTION INTRAVENOUS at 09:27

## 2020-07-22 RX ADMIN — METOPROLOL SUCCINATE 25 MG: 25 TABLET, EXTENDED RELEASE ORAL at 09:26

## 2020-07-22 RX ADMIN — METFORMIN HYDROCHLORIDE 500 MG: 500 TABLET ORAL at 09:26

## 2020-07-22 RX ADMIN — PRASUGREL 10 MG: 10 TABLET, FILM COATED ORAL at 09:26

## 2020-07-22 ASSESSMENT — PAIN DESCRIPTION - PAIN TYPE
TYPE: SURGICAL PAIN
TYPE: SURGICAL PAIN

## 2020-07-22 ASSESSMENT — PAIN DESCRIPTION - LOCATION
LOCATION: KNEE
LOCATION: KNEE

## 2020-07-22 ASSESSMENT — PAIN DESCRIPTION - PROGRESSION
CLINICAL_PROGRESSION: GRADUALLY WORSENING
CLINICAL_PROGRESSION: GRADUALLY WORSENING

## 2020-07-22 ASSESSMENT — PAIN DESCRIPTION - FREQUENCY
FREQUENCY: CONTINUOUS
FREQUENCY: CONTINUOUS

## 2020-07-22 ASSESSMENT — PAIN SCALES - GENERAL
PAINLEVEL_OUTOF10: 6

## 2020-07-22 ASSESSMENT — PAIN DESCRIPTION - DESCRIPTORS
DESCRIPTORS: SORE
DESCRIPTORS: ACHING;SORE

## 2020-07-22 ASSESSMENT — PAIN DESCRIPTION - ORIENTATION
ORIENTATION: RIGHT
ORIENTATION: RIGHT

## 2020-07-22 ASSESSMENT — PAIN DESCRIPTION - ONSET
ONSET: ON-GOING
ONSET: ON-GOING

## 2020-07-22 ASSESSMENT — PAIN - FUNCTIONAL ASSESSMENT
PAIN_FUNCTIONAL_ASSESSMENT: PREVENTS OR INTERFERES SOME ACTIVE ACTIVITIES AND ADLS
PAIN_FUNCTIONAL_ASSESSMENT: PREVENTS OR INTERFERES SOME ACTIVE ACTIVITIES AND ADLS

## 2020-07-22 NOTE — PROGRESS NOTES
Patient discharged home today with [facility]. Facility notified of patient's discharge and all documents were faxed. P ; F .   Electronically signed by Naeem Gabriel RN on 7/22/2020 at 10:34 AM

## 2020-07-22 NOTE — DISCHARGE SUMMARY
appointment time (1 month post-op). Patient was instructed on the use of pain medications, the signs and symptoms of infection, and was given our number to call should they have any questions or concerns following discharge.

## 2020-07-22 NOTE — PROGRESS NOTES
Subjective:     Post-Operative Day: 2 Status Post right TKA. Pt is awake and alert. Ox3. Doing well this am.  No new issues. He was able to ambulate 145 feet with therapy yesterday. Systemic or Specific Complaints: No Complaints  no nausea and no vomiting Pain 6    Objective:     Patient Vitals for the past 24 hrs:   BP Temp Temp src Pulse Resp SpO2   07/22/20 0703 121/64 98.3 °F (36.8 °C) Temporal 83 20 93 %   07/22/20 0343 (!) 102/50 100.8 °F (38.2 °C) Temporal 83 16 90 %   07/21/20 2249 (!) 100/42 100.2 °F (37.9 °C) Temporal 87 16 92 %   07/21/20 1914 115/67 100.8 °F (38.2 °C) Temporal 78 16 92 %   07/21/20 1509 (!) 100/52 98.8 °F (37.1 °C) Temporal 74 16 91 %   07/21/20 1108 (!) 101/55 98.6 °F (37 °C) Temporal 82 16 91 %       General: alert, appears stated age, cooperative, no distress and moderately obese   Exam: Fair active motion to right lower extremity   Wound: Wound clean and dry no evidence of infection. , No Drainage and Wound Intact   Neurovascular: Exam normal     DVT Exam: No evidence of DVT seen on physical exam.   Xray: None today       Data Review:  Recent Labs     07/21/20  0624 07/22/20  0331   HGB 10.7* 9.7*     Recent Labs     07/22/20  0331      K 4.1   CREATININE 1.0     Recent Labs     07/22/20  0331   LABGLOM >60     No results for input(s): INR in the last 72 hours. Assessment:     Status Post right TKA. Doing well postoperatively. Plan:     Pt will continue with his current nursing cares and therapy as tolerated. Plan for home today with home health as per total knee protocol. He may f/u in office or via telemedicine in 6 weeks.       Electronically signed by Felix Alfaro PA-C on 7/22/2020 at 8:19 AM

## 2020-07-22 NOTE — PROGRESS NOTES
[facility]  Daily Progress Note  Manuel Traylor  MRN: [id number] LOS: 2    Admit Date: 2020 7:07 AM          Chief Complaint:  Right knee pain    Interval History:    Reviewed overnight events and nursing notes.   Postoperative pain is controlled  Appetite and bowel function returning  Denies chest pain  No shortness of breath  Ambulated in hallway yesterday with therapy      DIET:  DIET GENERAL; Carb Control: 5 carb choices (75 gms)/meal    Medications:      sodium chloride 150 mL/hr at 20 0324    dextrose      lactated ringers 100 mL/hr at 20 0608    lactated ringers        aspirin  81 mg Oral BID    atorvastatin  80 mg Oral Nightly    metFORMIN  500 mg Oral Daily with breakfast    metoprolol succinate  25 mg Oral Daily    prasugrel  10 mg Oral Daily    sodium chloride flush  10 mL Intravenous 2 times per day    acetaminophen  650 mg Oral Q6H    sennosides-docusate sodium  1 tablet Oral BID    oxyCODONE  10 mg Oral 2 times per day    traMADol  50 mg Oral Q6H    tamsulosin  0.4 mg Oral Daily    dexamethasone  10 mg Intravenous Once    polyethylene glycol  17 g Oral Daily       Data:     Code Status: Full Code    Family History   Problem Relation Age of Onset    Heart Disease Mother     Diabetes Mother     Cancer Father         LUNG CA     Social History     Socioeconomic History    Marital status:      Spouse name: Not on file    Number of children: Not on file    Years of education: Not on file    Highest education level: Not on file   Occupational History    Not on file   Social Needs    Financial resource strain: Not on file    Food insecurity     Worry: Not on file     Inability: Not on file    Transportation needs     Medical: Not on file     Non-medical: Not on file   Tobacco Use    Smoking status: Former Smoker     Types: Cigarettes     Last attempt to quit: 1995     Years since quittin.3    Smokeless tobacco: Never Used Substance and Sexual Activity    Alcohol use: No    Drug use: No    Sexual activity: Not on file   Lifestyle    Physical activity     Days per week: Not on file     Minutes per session: Not on file    Stress: Not on file   Relationships    Social connections     Talks on phone: Not on file     Gets together: Not on file     Attends Jainism service: Not on file     Active member of club or organization: Not on file     Attends meetings of clubs or organizations: Not on file     Relationship status: Not on file    Intimate partner violence     Fear of current or ex partner: Not on file     Emotionally abused: Not on file     Physically abused: Not on file     Forced sexual activity: Not on file   Other Topics Concern    Not on file   Social History Narrative    Not on file       Labs:  Hematology:  Recent Labs     20  0624 20  0331   HGB 10.7* 9.7*   HCT 31.5* 29.8*     Chemistry:  Recent Labs     20  0624 20  0331    136   K 4.2 4.1    99   CO2 22 23   GLUCOSE 150* 139*   BUN 24* 20   CREATININE 1.1 1.0   ANIONGAP 12 14   LABGLOM >60 >60   GFRAA >59 >59   CALCIUM 8.2* 8.2*     Recent Labs     20  1119   LABA1C 6.2*       Objective:     Vitals: /64   Pulse 83   Temp 98.3 °F (36.8 °C) (Temporal)   Resp 20   Ht 6' (1.829 m)   Wt 238 lb (108 kg)   SpO2 93%   BMI 32.28 kg/m²      Intake/Output Summary (Last 24 hours) at 2020 0707  Last data filed at 2020 0521  Gross per 24 hour   Intake --   Output 310 ml   Net -310 ml    Temp (24hrs), Av.4 °F (37.4 °C), Min:98.3 °F (36.8 °C), Max:100.8 °F (38.2 °C)    Glucose:  Recent Labs     20  0741 20  1218 20  1551 20  2145   POCGLU 154* 161* 159* 124*     Physical Examination:   General appearance - alert, well appearing, and in no distress and oriented to person, place, and time  Mouth - mucous membranes moist, pharynx normal without lesions  Neck - supple, no significant adenopathy  Lymphatics - no palpable lymphadenopathy, no hepatosplenomegaly  Chest - clear to auscultation, no wheezes, rales or rhonchi, symmetric air entry, no tachypnea, retractions or cyanosis  Heart - normal rate, regular rhythm, normal S1, S2, no murmurs, rubs, clicks or gallops  Abdomen - soft, nontender, nondistended, no masses or organomegaly bowel sounds normal  Neurological - alert, oriented, normal speech, no focal findings or movement disorder noted  Musculoskeletal - no joint tenderness, deformity or swelling  Extremities - peripheral pulses normal, no pedal edema, no clubbing or cyanosis  Skin - normal coloration and turgor, no rashes, no suspicious skin lesions noted      Assessment and Plan:       Hospital Problem list: Treatment recommendations:  Principal Problem:    Primary osteoarthritis of right knee--postop day #2   CAD (coronary artery disease)    Essential hypertension--Hyzaar, metoprolol    Mixed hyperlipidemia--continue with Lipitor    Hx of CABG--- stable, no signs of cardiac issues    Primary osteoarthritis of right knee    Iron deficient anemia--stable, hemoglobin 9.7, orders for iron replacement    Diabetes mellitus type 2 with peripheral artery disease--metformin    Postoperative fever--T-max 100.8 °F, no signs of infection. Temperature currently normal, likely acute inflammatory response, continue to monitor    Medically stable postop day #2  Encourage incentive spirometry every 1 hour while awake  Wound care monitor for infection  Early mobilization, maximize efforts with therapy  Medically cleared for discharge home when okay with Ortho    Reviewed treatment plans with the patient and nursing staff  20 minutes spent in face to face interaction and coordination of care. Electronically signed by Bola Buchanan PA-C on 7/22/2020 at 7:07 AM     Looks great postoperative day #2. Episode of postop fever noted-currently afebrile.   Follow-up office within the week for hospital follow-up. Can be done via telehealth if needed. I have discussed the care of Kacy Sims, including pertinent history and exam findings with the ARNP/PA. I have seen and examined the patient and the key elements of all parts of the encounter have been performed by me. I agree with the assessment and plan as outlined by the ARNP/PA. Please refer to my separate note for complete documentation.      Electronically signed by Lakia Bagley MD on 7/22/2020 at 9:12 AM

## 2020-07-22 NOTE — PROGRESS NOTES
Patient discharged home today with [facility]. Medications and discharge instructions reviewed with patient. A handout of all new medications was given to the patient for reference with all possible side effects highlighted. Patient verbalized understanding. Patient stable upon discharge.   Electronically signed by Bolivar León RN on 7/22/2020 at 10:33 AM

## 2020-07-27 ENCOUNTER — TELEPHONE (OUTPATIENT)
Dept: INPATIENT UNIT | Age: 70
End: 2020-07-27

## 2020-12-22 ENCOUNTER — APPOINTMENT (OUTPATIENT)
Dept: CT IMAGING | Age: 70
End: 2020-12-22
Payer: MEDICARE

## 2020-12-22 ENCOUNTER — APPOINTMENT (OUTPATIENT)
Dept: GENERAL RADIOLOGY | Age: 70
End: 2020-12-22
Payer: MEDICARE

## 2020-12-22 ENCOUNTER — HOSPITAL ENCOUNTER (OUTPATIENT)
Age: 70
Setting detail: OBSERVATION
Discharge: HOME | End: 2020-12-23
Attending: EMERGENCY MEDICINE | Admitting: FAMILY MEDICINE
Payer: MEDICARE

## 2020-12-22 PROBLEM — R07.9 CHEST PAIN: Status: ACTIVE | Noted: 2020-12-22

## 2020-12-22 LAB
ALBUMIN SERPL-MCNC: 3.7 G/DL (ref 3.5–5.2)
ALP BLD-CCNC: 84 U/L (ref 40–130)
ALT SERPL-CCNC: 17 U/L (ref 5–41)
ANION GAP SERPL CALCULATED.3IONS-SCNC: 9 MMOL/L (ref 7–19)
AST SERPL-CCNC: 15 U/L (ref 5–40)
BILIRUB SERPL-MCNC: 0.4 MG/DL (ref 0.2–1.2)
BUN BLDV-MCNC: 16 MG/DL (ref 8–23)
CALCIUM SERPL-MCNC: 9.7 MG/DL (ref 8.8–10.2)
CHLORIDE BLD-SCNC: 104 MMOL/L (ref 98–111)
CO2: 24 MMOL/L (ref 22–29)
CREAT SERPL-MCNC: 0.6 MG/DL (ref 0.5–1.2)
D DIMER: 1.06 UG/ML FEU (ref 0–0.48)
EKG P AXIS: 18 DEGREES
EKG P-R INTERVAL: 160 MS
EKG Q-T INTERVAL: 386 MS
EKG QRS DURATION: 102 MS
EKG QTC CALCULATION (BAZETT): 396 MS
EKG T AXIS: 52 DEGREES
GFR AFRICAN AMERICAN: >59
GFR NON-AFRICAN AMERICAN: >60
GLUCOSE BLD-MCNC: 142 MG/DL (ref 74–109)
HCT VFR BLD CALC: 46.6 % (ref 42–52)
HEMOGLOBIN: 14.8 G/DL (ref 14–18)
INR BLD: 1.05 (ref 0.88–1.18)
LIPASE: 41 U/L (ref 13–60)
LV EF: 58 %
LVEF MODALITY: NORMAL
MCH RBC QN AUTO: 27.1 PG (ref 27–31)
MCHC RBC AUTO-ENTMCNC: 31.8 G/DL (ref 33–37)
MCV RBC AUTO: 85.3 FL (ref 80–94)
PDW BLD-RTO: 15.9 % (ref 11.5–14.5)
PLATELET # BLD: 150 K/UL (ref 130–400)
PMV BLD AUTO: 12.2 FL (ref 9.4–12.4)
POTASSIUM REFLEX MAGNESIUM: 4.1 MMOL/L (ref 3.5–5)
PRO-BNP: 55 PG/ML (ref 0–900)
PROTHROMBIN TIME: 13.6 SEC (ref 12–14.6)
RBC # BLD: 5.46 M/UL (ref 4.7–6.1)
SODIUM BLD-SCNC: 137 MMOL/L (ref 136–145)
TOTAL PROTEIN: 7.5 G/DL (ref 6.6–8.7)
TROPONIN: <0.01 NG/ML (ref 0–0.03)
TSH REFLEX FT4: 1.12 UIU/ML (ref 0.35–5.5)
WBC # BLD: 8.3 K/UL (ref 4.8–10.8)

## 2020-12-22 PROCEDURE — G0378 HOSPITAL OBSERVATION PER HR: HCPCS

## 2020-12-22 PROCEDURE — 84484 ASSAY OF TROPONIN QUANT: CPT

## 2020-12-22 PROCEDURE — 6360000002 HC RX W HCPCS: Performed by: PHYSICIAN ASSISTANT

## 2020-12-22 PROCEDURE — 96372 THER/PROPH/DIAG INJ SC/IM: CPT

## 2020-12-22 PROCEDURE — 83880 ASSAY OF NATRIURETIC PEPTIDE: CPT

## 2020-12-22 PROCEDURE — 6370000000 HC RX 637 (ALT 250 FOR IP): Performed by: PHYSICIAN ASSISTANT

## 2020-12-22 PROCEDURE — 93005 ELECTROCARDIOGRAM TRACING: CPT | Performed by: EMERGENCY MEDICINE

## 2020-12-22 PROCEDURE — 71045 X-RAY EXAM CHEST 1 VIEW: CPT

## 2020-12-22 PROCEDURE — 6370000000 HC RX 637 (ALT 250 FOR IP): Performed by: EMERGENCY MEDICINE

## 2020-12-22 PROCEDURE — 85379 FIBRIN DEGRADATION QUANT: CPT

## 2020-12-22 PROCEDURE — 93306 TTE W/DOPPLER COMPLETE: CPT

## 2020-12-22 PROCEDURE — 96375 TX/PRO/DX INJ NEW DRUG ADDON: CPT

## 2020-12-22 PROCEDURE — 93010 ELECTROCARDIOGRAM REPORT: CPT | Performed by: INTERNAL MEDICINE

## 2020-12-22 PROCEDURE — 80053 COMPREHEN METABOLIC PANEL: CPT

## 2020-12-22 PROCEDURE — 83690 ASSAY OF LIPASE: CPT

## 2020-12-22 PROCEDURE — 99284 EMERGENCY DEPT VISIT MOD MDM: CPT

## 2020-12-22 PROCEDURE — 6360000004 HC RX CONTRAST MEDICATION: Performed by: INTERNAL MEDICINE

## 2020-12-22 PROCEDURE — 99215 OFFICE O/P EST HI 40 MIN: CPT | Performed by: INTERNAL MEDICINE

## 2020-12-22 PROCEDURE — 84443 ASSAY THYROID STIM HORMONE: CPT

## 2020-12-22 PROCEDURE — 99999 PR OFFICE/OUTPT VISIT,PROCEDURE ONLY: CPT | Performed by: EMERGENCY MEDICINE

## 2020-12-22 PROCEDURE — 96374 THER/PROPH/DIAG INJ IV PUSH: CPT

## 2020-12-22 PROCEDURE — 85610 PROTHROMBIN TIME: CPT

## 2020-12-22 PROCEDURE — 71275 CT ANGIOGRAPHY CHEST: CPT

## 2020-12-22 PROCEDURE — 2580000003 HC RX 258: Performed by: PHYSICIAN ASSISTANT

## 2020-12-22 PROCEDURE — 2500000003 HC RX 250 WO HCPCS: Performed by: EMERGENCY MEDICINE

## 2020-12-22 PROCEDURE — 6360000002 HC RX W HCPCS: Performed by: EMERGENCY MEDICINE

## 2020-12-22 PROCEDURE — 36415 COLL VENOUS BLD VENIPUNCTURE: CPT

## 2020-12-22 PROCEDURE — 85027 COMPLETE CBC AUTOMATED: CPT

## 2020-12-22 RX ORDER — HYDROCHLOROTHIAZIDE 25 MG/1
25 TABLET ORAL DAILY
Status: DISCONTINUED | OUTPATIENT
Start: 2020-12-23 | End: 2020-12-23 | Stop reason: HOSPADM

## 2020-12-22 RX ORDER — CELECOXIB 200 MG/1
200 CAPSULE ORAL 2 TIMES DAILY
Status: ON HOLD | COMMUNITY
End: 2020-12-23 | Stop reason: HOSPADM

## 2020-12-22 RX ORDER — METOPROLOL SUCCINATE 25 MG/1
25 TABLET, EXTENDED RELEASE ORAL DAILY
Status: DISCONTINUED | OUTPATIENT
Start: 2020-12-23 | End: 2020-12-23 | Stop reason: HOSPADM

## 2020-12-22 RX ORDER — TRAMADOL HYDROCHLORIDE 50 MG/1
50 TABLET ORAL EVERY 6 HOURS PRN
COMMUNITY

## 2020-12-22 RX ORDER — PRASUGREL 10 MG/1
10 TABLET, FILM COATED ORAL DAILY
Status: DISCONTINUED | OUTPATIENT
Start: 2020-12-22 | End: 2020-12-23 | Stop reason: HOSPADM

## 2020-12-22 RX ORDER — FAMOTIDINE 20 MG/1
20 TABLET, FILM COATED ORAL 2 TIMES DAILY
Status: DISCONTINUED | OUTPATIENT
Start: 2020-12-22 | End: 2020-12-23 | Stop reason: HOSPADM

## 2020-12-22 RX ORDER — ATORVASTATIN CALCIUM 80 MG/1
80 TABLET, FILM COATED ORAL NIGHTLY
Status: DISCONTINUED | OUTPATIENT
Start: 2020-12-22 | End: 2020-12-23 | Stop reason: HOSPADM

## 2020-12-22 RX ORDER — SODIUM CHLORIDE 0.9 % (FLUSH) 0.9 %
10 SYRINGE (ML) INJECTION PRN
Status: DISCONTINUED | OUTPATIENT
Start: 2020-12-22 | End: 2020-12-23 | Stop reason: HOSPADM

## 2020-12-22 RX ORDER — MELOXICAM 15 MG/1
15 TABLET ORAL DAILY
Status: ON HOLD | COMMUNITY
End: 2020-12-23 | Stop reason: HOSPADM

## 2020-12-22 RX ORDER — ONDANSETRON 2 MG/ML
4 INJECTION INTRAMUSCULAR; INTRAVENOUS ONCE
Status: COMPLETED | OUTPATIENT
Start: 2020-12-22 | End: 2020-12-22

## 2020-12-22 RX ORDER — SODIUM CHLORIDE 9 MG/ML
INJECTION, SOLUTION INTRAVENOUS CONTINUOUS
Status: DISCONTINUED | OUTPATIENT
Start: 2020-12-22 | End: 2020-12-23 | Stop reason: HOSPADM

## 2020-12-22 RX ORDER — LOSARTAN POTASSIUM 100 MG/1
100 TABLET ORAL DAILY
Status: DISCONTINUED | OUTPATIENT
Start: 2020-12-23 | End: 2020-12-23 | Stop reason: HOSPADM

## 2020-12-22 RX ORDER — SODIUM CHLORIDE 0.9 % (FLUSH) 0.9 %
10 SYRINGE (ML) INJECTION EVERY 12 HOURS SCHEDULED
Status: DISCONTINUED | OUTPATIENT
Start: 2020-12-22 | End: 2020-12-22

## 2020-12-22 RX ORDER — LOSARTAN POTASSIUM AND HYDROCHLOROTHIAZIDE 25; 100 MG/1; MG/1
1 TABLET ORAL DAILY
Status: DISCONTINUED | OUTPATIENT
Start: 2020-12-22 | End: 2020-12-22

## 2020-12-22 RX ORDER — ASPIRIN 81 MG/1
81 TABLET ORAL 2 TIMES DAILY
Status: DISCONTINUED | OUTPATIENT
Start: 2020-12-22 | End: 2020-12-23 | Stop reason: HOSPADM

## 2020-12-22 RX ORDER — NITROGLYCERIN 0.4 MG/1
0.4 TABLET SUBLINGUAL ONCE
Status: COMPLETED | OUTPATIENT
Start: 2020-12-22 | End: 2020-12-22

## 2020-12-22 RX ADMIN — SODIUM CHLORIDE, PRESERVATIVE FREE 10 ML: 5 INJECTION INTRAVENOUS at 17:06

## 2020-12-22 RX ADMIN — IOPAMIDOL 90 ML: 755 INJECTION, SOLUTION INTRAVENOUS at 17:06

## 2020-12-22 RX ADMIN — FAMOTIDINE 20 MG: 20 TABLET, FILM COATED ORAL at 21:57

## 2020-12-22 RX ADMIN — ONDANSETRON HYDROCHLORIDE 4 MG: 2 SOLUTION INTRAMUSCULAR; INTRAVENOUS at 10:53

## 2020-12-22 RX ADMIN — ATORVASTATIN CALCIUM 80 MG: 80 TABLET, FILM COATED ORAL at 21:57

## 2020-12-22 RX ADMIN — SODIUM CHLORIDE: 9 INJECTION, SOLUTION INTRAVENOUS at 17:06

## 2020-12-22 RX ADMIN — ENOXAPARIN SODIUM 40 MG: 40 INJECTION SUBCUTANEOUS at 13:44

## 2020-12-22 RX ADMIN — FAMOTIDINE 20 MG: 10 INJECTION, SOLUTION INTRAVENOUS at 10:53

## 2020-12-22 RX ADMIN — ASPIRIN 81 MG: 81 TABLET, COATED ORAL at 21:57

## 2020-12-22 RX ADMIN — SODIUM CHLORIDE, PRESERVATIVE FREE 10 ML: 5 INJECTION INTRAVENOUS at 21:57

## 2020-12-22 RX ADMIN — NITROGLYCERIN 0.4 MG: 0.4 TABLET, ORALLY DISINTEGRATING SUBLINGUAL at 10:53

## 2020-12-22 RX ADMIN — PRASUGREL 10 MG: 10 TABLET, FILM COATED ORAL at 17:10

## 2020-12-22 ASSESSMENT — ENCOUNTER SYMPTOMS
BLOOD IN STOOL: 0
SHORTNESS OF BREATH: 1
GASTROINTESTINAL NEGATIVE: 1
DIARRHEA: 0
EYE PAIN: 0
RESPIRATORY NEGATIVE: 1
EYES NEGATIVE: 1
ABDOMINAL PAIN: 1
VOMITING: 0
SHORTNESS OF BREATH: 0
NAUSEA: 0

## 2020-12-22 ASSESSMENT — PAIN SCALES - GENERAL
PAINLEVEL_OUTOF10: 4
PAINLEVEL_OUTOF10: 1
PAINLEVEL_OUTOF10: 0
PAINLEVEL_OUTOF10: 0

## 2020-12-22 ASSESSMENT — PAIN DESCRIPTION - PROGRESSION: CLINICAL_PROGRESSION: RAPIDLY IMPROVING

## 2020-12-22 ASSESSMENT — PAIN DESCRIPTION - ORIENTATION: ORIENTATION: MID

## 2020-12-22 ASSESSMENT — PAIN DESCRIPTION - LOCATION: LOCATION: CHEST

## 2020-12-22 ASSESSMENT — PAIN - FUNCTIONAL ASSESSMENT: PAIN_FUNCTIONAL_ASSESSMENT: ACTIVITIES ARE NOT PREVENTED

## 2020-12-22 ASSESSMENT — PAIN DESCRIPTION - DESCRIPTORS: DESCRIPTORS: PRESSURE

## 2020-12-22 ASSESSMENT — PAIN DESCRIPTION - PAIN TYPE: TYPE: ACUTE PAIN

## 2020-12-22 ASSESSMENT — PAIN DESCRIPTION - ONSET: ONSET: ON-GOING

## 2020-12-22 ASSESSMENT — PAIN DESCRIPTION - FREQUENCY: FREQUENCY: CONTINUOUS

## 2020-12-22 NOTE — ED PROVIDER NOTES
noted above in the HPI. PAST MEDICAL HISTORY     Past Medical History:   Diagnosis Date    CAD (coronary artery disease)     recent stent per violeta    Cervical myelopathy ([facility] [address] 4/11/2019    Degeneration of intervertebral disc at C4-C5 level 4/11/2019    Diabetes mellitus ([facility] [address]     Hyperlipidemia     Hypertension     Knee pain          SURGICAL HISTORY       Past Surgical History:   Procedure Laterality Date    BACK SURGERY      CARDIAC SURGERY  01/01/1999    CABG X 4VLIMA to LAD, SVG to OM, diag 1 and RCA    CERVICAL FUSION N/A 4/11/2019    C3-4 C4-5 ACDF performed by Francisco Shi MD at [address] [place] [address] CATH LAB PROCEDURE      KNEE SURGERY Right     scope, age 35ish    [facility] TOTAL KNEE ARTHROPLASTY Right 7/20/2020    RIGHT TOTAL KNEE REPLACEMENT performed by Lorenzo Kulkarni MD at [facility]       Previous Medications    ASPIRIN 81 MG EC TABLET    Take 1 tablet by mouth 2 times daily    ATORVASTATIN (LIPITOR) 80 MG TABLET    Take 1 tablet by mouth nightly    LOSARTAN-HYDROCHLOROTHIAZIDE (HYZAAR) 100-25 MG PER TABLET    Take 1 tablet by mouth daily    METFORMIN (GLUCOPHAGE) 500 MG TABLET    HOLD METFORMIN FOR 48 HOURS AFTER THE CARDIAC CATHETERIZATION    METOPROLOL SUCCINATE (TOPROL XL) 25 MG EXTENDED RELEASE TABLET    Take 1 tablet by mouth daily    PRASUGREL (EFFIENT) 10 MG TABS    Take 1 tablet by mouth daily       ALLERGIES     Patient has no known allergies.     FAMILY HISTORY       Family History   Problem Relation Age of Onset    Heart Disease Mother     Diabetes Mother     Cancer Father         LUNG CA          SOCIAL HISTORY       Social History     Socioeconomic History    Marital status:      Spouse name: None    Number of children: None    Years of education: None    Highest education level: None   Occupational History    None   Social Needs    Financial resource strain: None    Food insecurity     Worry: None Inability: None    Transportation needs     Medical: None     Non-medical: None   Tobacco Use    Smoking status: Former Smoker     Types: Cigarettes     Quit date: 1995     Years since quittin.7    Smokeless tobacco: Never Used   Substance and Sexual Activity    Alcohol use: No    Drug use: No    Sexual activity: None   Lifestyle    Physical activity     Days per week: None     Minutes per session: None    Stress: None   Relationships    Social connections     Talks on phone: None     Gets together: None     Attends Pentecostalism service: None     Active member of club or organization: None     Attends meetings of clubs or organizations: None     Relationship status: None    Intimate partner violence     Fear of current or ex partner: None     Emotionally abused: None     Physically abused: None     Forced sexual activity: None   Other Topics Concern    None   Social History Narrative    None       SCREENINGS    [place] Coma Scale  Eye Opening: Spontaneous  Best Verbal Response: Oriented  Best Motor Response: Obeys commands  [place] Coma Scale Score: 15        PHYSICAL EXAM    (up to 7 for level 4, 8 or more for level 5)     ED Triage Vitals [20 1009]   BP Temp Temp src Pulse Resp SpO2 Height Weight   (!) 169/76 98.1 °F (36.7 °C) -- 66 20 93 % 6' (1.829 m) 240 lb (108.9 kg)       Physical Exam  Vitals signs reviewed. Constitutional:       General: He is not in acute distress. Appearance: He is well-developed. He is diaphoretic (mild). HENT:      Head: Normocephalic and atraumatic. Mouth/Throat:      Mouth: Mucous membranes are moist.      Pharynx: Oropharynx is clear. Eyes:      General: No scleral icterus. Pupils: Pupils are equal, round, and reactive to light. Neck:      Musculoskeletal: Normal range of motion and neck supple. Vascular: No JVD. Cardiovascular:      Rate and Rhythm: Normal rate and regular rhythm. Pulses: Normal pulses.       Heart sounds: dictation. EMERGENCY DEPARTMENT COURSE and DIFFERENTIALDIAGNOSIS/MDM:   Vitals:    Vitals:    12/22/20 1009 12/22/20 1015 12/22/20 1030 12/22/20 1103   BP: (!) 169/76 (!) 169/76 124/71 111/66   Pulse: 66 70 61 65   Resp: 20  15 18   Temp: 98.1 °F (36.7 °C)      SpO2: 93% 94% 94% (!) 89%   Weight: 240 lb (108.9 kg)      Height: 6' (1.829 m)          MDM  Patient denies any dyspnea currently. States had some very mild dyspnea when his chest pain was at the worst but none now. No hemoptysis. No unilateral leg swelling or pain. No history of DVT or PE. He is not tachycardic or hypoxic. Patient said that his symptoms feel very similar to unstable angina he has had in the past.  With the above in mind, think PE is very unlikely so do not see indication for D-dimer or CTA of the chest.    Symptoms much better after nitro, Pepcid and Zofran. Patient resting comfortably at this time. Abdominal discomfort much better. Has some very mild tenderness in the epigastric region without rebound or guarding. Patient describes as minimal.  Offered to do CT scan of the abdomen pelvis which he declined. Patient's case discussed with [employer] Johnson, who is on with Dr. Johnathon Cervantes. Agreeable plan of care and admission. I will put in bridge orders per his request.    CONSULTS:  IP CONSULT TO CARDIOLOGY    PROCEDURES:  Unless otherwise notedbelow, none     Procedures    FINAL IMPRESSION     1.  Chest pain, unspecified type          DISPOSITION/PLAN   DISPOSITION Admitted 12/22/2020 11:23:00 AM      PATIENT REFERRED TO:  @FUP@    DISCHARGE MEDICATIONS:  New Prescriptions    No medications on file          (Please note that portions of this note were completed with a voice recognition program.  Efforts were made to edit the dictations butoccasionally words are mis-transcribed.)    Jimmy Childers MD (electronically signed)  AttendingEmergency Physician         Jimmy Childers MD  12/22/20 8755

## 2020-12-22 NOTE — CONSULTS
[facility]  Cardiology Consult      Requesting MD:  Rodo Estrada MD   Admit Status:  Observation [104]       History obtained from:   [] Patient  [] Other (specify):     Patient:  Ashwini Race  [id number]     Chief Complaint:   Chief Complaint   Patient presents with    Chest Pain     started yesterday; took full strength asa pta       HPI: Mr. Donna Flores is a 79 y.o. male with a history of coronary artery disease previous CABG x4 history of stent placement in his usual state of health until the last 48 hours developed sharp stabbing pains mid left chest worse with inspiration worse with position movement denies fever chills or cough. Denies medical noncompliance. Does not describe typical exertional angina he does get out of breath to some extent with activities. Initial troponins negative. Here for further assessment and treatment. Review of Systems:  Review of Systems   Constitutional: Negative. Negative for chills, fever and unexpected weight change. HENT: Negative. Eyes: Negative. Respiratory: Negative. Negative for shortness of breath. Cardiovascular: Negative. Negative for chest pain. Gastrointestinal: Negative. Negative for diarrhea, nausea and vomiting. Endocrine: Negative. Genitourinary: Negative. Musculoskeletal: Negative. Skin: Negative. Neurological: Negative. All other systems reviewed and are negative.       Cardiac Specific Data:  Specialty Problems        Cardiology Problems    CAD (coronary artery disease)        Essential hypertension        Mixed hyperlipidemia        Diabetes mellitus type 2 with peripheral artery disease (HCC)        Chest pain              Past Medical History:  Past Medical History:   Diagnosis Date    CAD (coronary artery disease)     recent stent per violeta    Cervical myelopathy ([facility] [address] 4/11/2019    Degeneration of intervertebral disc at C4-C5 level 4/11/2019    Diabetes mellitus ([facility] [address]     Hyperlipidemia  Hypertension     Knee pain         Past Surgical History:  Past Surgical History:   Procedure Laterality Date    BACK SURGERY      CARDIAC SURGERY  1999    CABG X 4VLIMA to LAD, SVG to OM, diag 1 and RCA    CERVICAL FUSION N/A 2019    C3-4 C4-5 ACDF performed by Franky Walter MD at [address] [place] [address] [place] Right     [facility], [facility] 35[facility] Right 2020    RIGHT TOTAL KNEE REPLACEMENT performed by Sanjana Hsu MD at [address] [facility] [place], operated by [facility]       Past Family History:  Family History   Problem Relation Age of Onset    Heart Disease Mother     Diabetes Mother     Cancer Father         LUNG CA       Past Social History:  Social History     Socioeconomic History    Marital status:      Spouse name: Ramesh Coyne Number of children: Not on file    Years of education: Not on file    Highest education level: Not on file   Occupational History    Not on file   Social Needs    Financial resource strain: Not on file    Food insecurity     Worry: Not on file     Inability: Not on file   [employer] needs     Medical: Not on file     Non-medical: Not on file   Tobacco Use    Smoking status: Former Smoker     Types: Cigarettes     Quit date: 1995     Years since quittin.7    Smokeless tobacco: Never Used   Substance and Sexual Activity    Alcohol use: No    Drug use: No    Sexual activity: Not on file   Lifestyle    Physical activity     Days per week: Not on file     Minutes per session: Not on file    Stress: Not on file   Relationships    Social connections     Talks on phone: Not on file     Gets together: Not on file     Attends Taoist service: Not on file     Active member of club or organization: Not on file     Attends meetings of clubs or organizations: Not on file     Relationship status: Not on file    Intimate partner violence     Fear of current or ex partner: Not on file     Emotionally abused: Not on file     Physically abused: Not on file     Forced sexual activity: Not on file   Other Topics Concern    Not on file   Social History Narrative    Not on file       Allergies:  No Known Allergies    Home Meds:  Prior to Admission medications    Medication Sig Start Date End Date Taking? Authorizing Provider   aspirin 81 MG EC tablet Take 1 tablet by mouth 2 times daily 7/22/20 12/22/20 Yes Mary Damon PA-C   atorvastatin (LIPITOR) 80 MG tablet Take 1 tablet by mouth nightly 6/2/20  Yes Becky Jimenes MD   metoprolol succinate (TOPROL XL) 25 MG extended release tablet Take 1 tablet by mouth daily 6/3/20  Yes Becky Jimenes MD   metFORMIN (GLUCOPHAGE) 500 MG tablet HOLD METFORMIN FOR [facility] [address] CATHETERIZATION  Patient taking differently: Take 500 mg by mouth daily (with breakfast)  6/2/20  Yes Becky Jimenes MD   losartan-hydrochlorothiazide (HYZAAR) 100-25 MG per tablet Take 1 tablet by mouth daily   Yes Historical Provider, MD   prasugrel (EFFIENT) 10 MG TABS Take 1 tablet by mouth daily 6/26/20   MARIETTA Davidson - NP       Current Meds:   sodium chloride flush  10 mL Intravenous 2 times per day    enoxaparin  40 mg Subcutaneous Daily       Current Infused Meds:      Physical Exam:  Vitals:    12/22/20 1245   BP: 139/69   Pulse: 57   Resp: 16   Temp: 96.5 °F (35.8 °C)   SpO2: 97%       Intake/Output Summary (Last 24 hours) at 12/22/2020 1543  Last data filed at 12/22/2020 1419  Gross per 24 hour   Intake 120 ml   Output --   Net 120 ml     Estimated body mass index is 32.55 kg/m² as calculated from the following:    Height as of this encounter: 6' (1.829 m). Weight as of this encounter: 240 lb (108.9 kg). Physical Exam  Vitals signs reviewed. Constitutional:       General: He is not in acute distress. Appearance: Normal appearance. He is well-developed and normal weight. He is not ill-appearing, toxic-appearing or diaphoretic.    HENT:      Head: Normocephalic and atraumatic. Nose: Nose normal.      Mouth/Throat:      Mouth: Mucous membranes are moist.      Pharynx: Oropharynx is clear. Eyes:      General: No scleral icterus. Extraocular Movements: Extraocular movements intact. Pupils: Pupils are equal, round, and reactive to light. Neck:      Musculoskeletal: Normal range of motion and neck supple. No neck rigidity or muscular tenderness. Vascular: No carotid bruit or JVD. Cardiovascular:      Rate and Rhythm: Normal rate and regular rhythm. Heart sounds: Normal heart sounds. No murmur. No friction rub. No gallop. Pulmonary:      Effort: Pulmonary effort is normal. No respiratory distress. Breath sounds: Normal breath sounds. No stridor. No wheezing, rhonchi or rales. Chest:      Chest wall: No tenderness. Abdominal:      General: Abdomen is flat. Bowel sounds are normal. There is no distension. Palpations: Abdomen is soft. There is no mass. Tenderness: There is no abdominal tenderness. There is no right CVA tenderness, left CVA tenderness, guarding or rebound. Hernia: No hernia is present. Musculoskeletal:         General: No swelling, tenderness, deformity or signs of injury. Right lower leg: No edema. Left lower leg: No edema. Lymphadenopathy:      Cervical: No cervical adenopathy. Skin:     General: Skin is warm and dry. Neurological:      General: No focal deficit present. Mental Status: He is alert and oriented to person, place, and time. Mental status is at baseline. Cranial Nerves: No cranial nerve deficit. Sensory: No sensory deficit. Motor: No weakness. Coordination: Coordination normal.   Psychiatric:         Mood and Affect: Mood normal.         Thought Content:  Thought content normal.         Judgment: Judgment normal.         Labs:  Recent Labs     12/22/20  1049   WBC 8.3   HGB 14.8          Recent Labs     12/22/20  1049      K

## 2020-12-23 ENCOUNTER — APPOINTMENT (OUTPATIENT)
Dept: NUCLEAR MEDICINE | Age: 70
End: 2020-12-23
Payer: MEDICARE

## 2020-12-23 VITALS
RESPIRATION RATE: 16 BRPM | WEIGHT: 240 LBS | OXYGEN SATURATION: 95 % | SYSTOLIC BLOOD PRESSURE: 133 MMHG | BODY MASS INDEX: 32.51 KG/M2 | DIASTOLIC BLOOD PRESSURE: 76 MMHG | HEIGHT: 72 IN | TEMPERATURE: 97.8 F | HEART RATE: 60 BPM

## 2020-12-23 LAB
CHOLESTEROL, TOTAL: 125 MG/DL (ref 160–199)
EKG P AXIS: 36 DEGREES
EKG P-R INTERVAL: 164 MS
EKG Q-T INTERVAL: 440 MS
EKG QRS DURATION: 102 MS
EKG QTC CALCULATION (BAZETT): 430 MS
EKG T AXIS: 25 DEGREES
HDLC SERPL-MCNC: 36 MG/DL (ref 55–121)
LDL CHOLESTEROL CALCULATED: 59 MG/DL
TRIGL SERPL-MCNC: 149 MG/DL (ref 0–149)
TROPONIN: <0.01 NG/ML (ref 0–0.03)

## 2020-12-23 PROCEDURE — A9500 TC99M SESTAMIBI: HCPCS | Performed by: INTERNAL MEDICINE

## 2020-12-23 PROCEDURE — 3430000000 HC RX DIAGNOSTIC RADIOPHARMACEUTICAL: Performed by: INTERNAL MEDICINE

## 2020-12-23 PROCEDURE — 84484 ASSAY OF TROPONIN QUANT: CPT

## 2020-12-23 PROCEDURE — G0378 HOSPITAL OBSERVATION PER HR: HCPCS

## 2020-12-23 PROCEDURE — 96372 THER/PROPH/DIAG INJ SC/IM: CPT

## 2020-12-23 PROCEDURE — 93010 ELECTROCARDIOGRAM REPORT: CPT | Performed by: INTERNAL MEDICINE

## 2020-12-23 PROCEDURE — 36415 COLL VENOUS BLD VENIPUNCTURE: CPT

## 2020-12-23 PROCEDURE — 99214 OFFICE O/P EST MOD 30 MIN: CPT | Performed by: INTERNAL MEDICINE

## 2020-12-23 PROCEDURE — 93017 CV STRESS TEST TRACING ONLY: CPT

## 2020-12-23 PROCEDURE — 6370000000 HC RX 637 (ALT 250 FOR IP): Performed by: PHYSICIAN ASSISTANT

## 2020-12-23 PROCEDURE — 80061 LIPID PANEL: CPT

## 2020-12-23 PROCEDURE — 6360000002 HC RX W HCPCS: Performed by: PHYSICIAN ASSISTANT

## 2020-12-23 PROCEDURE — 93005 ELECTROCARDIOGRAM TRACING: CPT | Performed by: INTERNAL MEDICINE

## 2020-12-23 PROCEDURE — 6360000002 HC RX W HCPCS: Performed by: INTERNAL MEDICINE

## 2020-12-23 RX ORDER — NITROGLYCERIN 0.4 MG/1
0.4 TABLET SUBLINGUAL EVERY 5 MIN PRN
Qty: 25 TABLET | Refills: 3 | Status: SHIPPED | OUTPATIENT
Start: 2020-12-23

## 2020-12-23 RX ORDER — SUCRALFATE 1 G/1
1 TABLET ORAL 4 TIMES DAILY
Qty: 120 TABLET | Refills: 3 | Status: ON HOLD | OUTPATIENT
Start: 2020-12-23 | End: 2021-07-23 | Stop reason: ALTCHOICE

## 2020-12-23 RX ORDER — FAMOTIDINE 20 MG/1
20 TABLET, FILM COATED ORAL 2 TIMES DAILY
Qty: 60 TABLET | Refills: 3 | Status: ON HOLD | OUTPATIENT
Start: 2020-12-23 | End: 2021-07-23 | Stop reason: ALTCHOICE

## 2020-12-23 RX ADMIN — LOSARTAN POTASSIUM 100 MG: 100 TABLET, FILM COATED ORAL at 08:04

## 2020-12-23 RX ADMIN — METOPROLOL SUCCINATE 25 MG: 25 TABLET, EXTENDED RELEASE ORAL at 08:03

## 2020-12-23 RX ADMIN — PRASUGREL 10 MG: 10 TABLET, FILM COATED ORAL at 08:03

## 2020-12-23 RX ADMIN — ENOXAPARIN SODIUM 40 MG: 40 INJECTION SUBCUTANEOUS at 08:04

## 2020-12-23 RX ADMIN — HYDROCHLOROTHIAZIDE 25 MG: 25 TABLET ORAL at 08:03

## 2020-12-23 RX ADMIN — REGADENOSON 0.4 MG: 0.08 INJECTION, SOLUTION INTRAVENOUS at 09:32

## 2020-12-23 RX ADMIN — TETRAKIS(2-METHOXYISOBUTYLISOCYANIDE)COPPER(I) TETRAFLUOROBORATE 10 MILLICURIE: 1 INJECTION, POWDER, LYOPHILIZED, FOR SOLUTION INTRAVENOUS at 10:24

## 2020-12-23 RX ADMIN — FAMOTIDINE 20 MG: 20 TABLET, FILM COATED ORAL at 08:03

## 2020-12-23 RX ADMIN — ASPIRIN 81 MG: 81 TABLET, COATED ORAL at 08:04

## 2020-12-23 RX ADMIN — METFORMIN HYDROCHLORIDE 500 MG: 500 TABLET ORAL at 08:11

## 2020-12-23 RX ADMIN — TETRAKIS(2-METHOXYISOBUTYLISOCYANIDE)COPPER(I) TETRAFLUOROBORATE 30 MILLICURIE: 1 INJECTION, POWDER, LYOPHILIZED, FOR SOLUTION INTRAVENOUS at 10:24

## 2020-12-23 ASSESSMENT — PAIN SCALES - GENERAL: PAINLEVEL_OUTOF10: 0

## 2020-12-23 NOTE — PROGRESS NOTES
Mejia Rodriguez arrived to room # 319. Presented with: Chest pain  Mental Status: Patient is oriented and alert. Vitals:    12/22/20 1939   BP: (!) 143/76   Pulse: 58   Resp: 16   Temp: 97.6 °F (36.4 °C)   SpO2: 96%     Patient safety contract and falls prevention contract reviewed with patient No.  Oriented Patient to room. Call light within reach. Yes. Needs, issues or concerns expressed at this time: no.  Patient states he has very mild chest pressure at this time.       Electronically signed by Adela Jones RN on 12/22/2020 at 7:47 PM

## 2020-12-23 NOTE — H&P
[facility]  History and Physical    Patient:  Bella Yuen  MRN: [id number]    CHIEF COMPLAINT:  Chest Pain      PCP: Srikanth Waters MD    HISTORY OF PRESENT ILLNESS:   The patient is a 79 y.o. male who presents with Bella Yuen is a 79 y.o. male who presents to the emergency department yesterday due to chest pain. Patient has a history of coronary disease. Monday and yesterday he had left-sided chest pressure intermittently. Pain described as dull and fairly constant. No clear exacerbating/alleviating factors. Not short of breath currently. Has had some mild dyspnea when pain is at its worse. No hemoptysis. No cough. No fever. No unilateral leg swelling or pain. No history of DVT or PE. Has had some belching and feelings of indigestion as well. Page Greener he has also felt diaphoretic off and on since symptoms started Monday. Took 325 mg aspirin prior to arrival.  He does have a significant history of coronary artery disease with previous CABG x4 & history of stent placement. Recently canceled scheduled office appointment December 7. States he is otherwise been of normal health denies any fever or illnesses. He denies any strenuous activity to explain muscular etiology of his chest pain. His initial cardiac work-up is been unremarkable. He has been admitted with cardiology consultation.       Past Medical History:      Diagnosis Date    CAD (coronary artery disease)     recent stent per violeta    Cervical myelopathy ([facility] [address] 4/11/2019    Degeneration of intervertebral disc at C4-C5 level 4/11/2019    Diabetes mellitus ([address]     Hyperlipidemia     Hypertension     Knee pain        Past Surgical History:      Procedure Laterality Date    BACK SURGERY      CARDIAC SURGERY  01/01/1999    CABG X 4VLIMA to LAD, SVG to OM, diag 1 and RCA    CERVICAL FUSION N/A 4/11/2019    C3-4 C4-5 ACDF performed by Kristopher Zamudio MD at [address] [place] [address] CATH LAB PROCEDURE      KNEE SURGERY Right     scope, age 35[facility] TOTAL KNEE ARTHROPLASTY Right 7/20/2020    RIGHT TOTAL KNEE REPLACEMENT performed by Amina Moon MD at [facility]       Medications Prior to Admission:    Prior to Admission medications    Medication Sig Start Date End Date Taking? Authorizing Provider   meloxicam (MOBIC) 15 MG tablet Take 15 mg by mouth daily   Yes Historical Provider, MD   celecoxib (CELEBREX) 200 MG capsule Take 200 mg by mouth 2 times daily   Yes Historical Provider, MD   traMADol (ULTRAM) 50 MG tablet Take 50 mg by mouth every 6 hours as needed for Pain. Patient does not know frequency ordered. Yes Historical Provider, MD   aspirin 81 MG EC tablet Take 1 tablet by mouth 2 times daily 7/22/20 12/22/20 Yes Frederick Damon PA-C   atorvastatin (LIPITOR) 80 MG tablet Take 1 tablet by mouth nightly 6/2/20  Yes Olvin Bolanos MD   metoprolol succinate (TOPROL XL) 25 MG extended release tablet Take 1 tablet by mouth daily 6/3/20  Yes Olvin Bolanos MD   metFORMIN (GLUCOPHAGE) 500 MG tablet HOLD METFORMIN FOR [facility] [address] CATHETERIZATION  Patient taking differently: Take 500 mg by mouth daily (with breakfast)  6/2/20  Yes Olvin Bolanos MD   losartan-hydrochlorothiazide (HYZAAR) 100-25 MG per tablet Take 1 tablet by mouth daily   Yes Historical Provider, MD   prasugrel (EFFIENT) 10 MG TABS Take 1 tablet by mouth daily 6/26/20   MARIETTA Rodríguez NP       Allergies:  Patient has no known allergies.     Social History:   Social History     Socioeconomic History    Marital status:      Spouse name: Naomi Curry Number of children: Not on file    Years of education: Not on file    Highest education level: Not on file   Occupational History    Not on file   Social Needs    Financial resource strain: Not on file    Food insecurity     Worry: Not on file     Inability: Not on file   [employer] needs     Medical: Not on file     Non-medical: Not on file   Tobacco Use    Smoking status: Former Smoker     Types: Cigarettes     Quit date: 1995     Years since quittin.7    Smokeless tobacco: Never Used   Substance and Sexual Activity    Alcohol use: No    Drug use: No    Sexual activity: Not on file   Lifestyle    Physical activity     Days per week: Not on file     Minutes per session: Not on file    Stress: Not on file   Relationships    Social connections     Talks on phone: Not on file     Gets together: Not on file     Attends Alevism service: Not on file     Active member of club or organization: Not on file     Attends meetings of clubs or organizations: Not on file     Relationship status: Not on file    Intimate partner violence     Fear of current or ex partner: Not on file     Emotionally abused: Not on file     Physically abused: Not on file     Forced sexual activity: Not on file   Other Topics Concern    Not on file   Social History Narrative    Not on file       Family History:       Problem Relation Age of Onset    Heart Disease Mother     Diabetes Mother     Cancer Father         LUNG CA      Review of systems:    Con: no fever/chills or significant weight changes   Heent: denies HA, change in vision or hearing. No significant sinus drainage. No             difficulties swallowing. No recent trauma noted. CV: As above  Pulm: No cough, sputum production, denies hemoptysis   GI: denies changes in bowel habits, no diarrhea or significant constipation. No BRBPR       or melena. : no dysuria, hesitancy or dysuria. Denies hematuria. Derm: no rashes or new lesions of concern. Psych: no signs of depression, anxiety or significant mood changes.   Neuro: denies focal weakness or change in mentation  A full 14 point review of systems is otherwise negative outside listed above and HPI      Physical Exam:    Vitals: BP (!) 142/71   Pulse 58   Temp 97.5 °F (36.4 °C) (Temporal)   Resp 18   Ht 6' (1.829 m)   Wt 240 lb (108.9 kg)   SpO2 94% BMI 32.55 kg/m²     GENERAL: WD/WN not in acute distress, resting well in bed  HEENT: NC/AT PERRL, EOMI grossly, TM's clear, OP negative without sig erythema or exudate, neck is supple without masses or carotid bruit noted    CV: normal S1 and S2, no sig murmur, lift or gallop, normal PMI  CHEST: clear to auscultation both anterior and posterior, no rales rhonchi or wheeze appreciated. ABD: soft NT/ND with normoactive BS noted. No guarding or rebounding noted  /rectal: deferred  EXT: no cyanosis or clubbing noted, normal ROM  DERM: skin is warm and dry without sig rashes on exposed areas  PSYCH: appears mentally stable with no obvious abnormalities  NEURO: CN 2-12 apper grossly intact without sig deficits in motor or sensory       CBC:   Recent Labs     12/22/20  1049   WBC 8.3   HGB 14.8        BMP:    Recent Labs     12/22/20  1049      K 4.1      CO2 24   BUN 16   CREATININE 0.6   GLUCOSE 142*     Hepatic:   Recent Labs     12/22/20  1049   AST 15   ALT 17   BILITOT 0.4   ALKPHOS 84     Troponin T:   Recent Labs     12/22/20  1256 12/22/20  1607 12/22/20  2207   TROPONINI <0.01 <0.01 <0.01     Pro-BNP: No results for input(s): BNP in the last 72 hours. Lipids: No results for input(s): CHOL, HDL in the last 72 hours. Invalid input(s): LDLCALCU  ABGs: No results found for: PHART, PO2ART, HTH5YNE  INR:   Recent Labs     12/22/20  1049   INR 1.05     -----------------------------------------------------------------  EKG:   Radiology:     Xr Chest Portable    Result Date: 12/22/2020  Examination. XR CHEST PORTABLE 12/22/2020 9:31 AM History: Chest pain. A single frontal portable upright view of the chest is compared with the previous study dated 6/1/2020. The lungs are poorly inflated. There is a persistent moderate elevation right diaphragm. There is no evidence of recent infiltrate, pleural effusion, pulmonary congestion or pneumothorax.  The heart size is not evaluated due to the portable projection. There is evidence of previous cardiac surgery. The hardware fusion of the cervical spine is partially visualized. No acute bony abnormality. No active cardiopulmonary disease. Signed by Dr Jose Enrique Vasquez on 12/22/2020 10:42 AM    Cta Pulmonary W Contrast    Result Date: 12/22/2020  CTA PULMONARY W CONTRAST 12/22/2020 5:20 PM History: Chest pain and short of breath. Elevated d-dimer. In order to have a CT radiation dose as low as reasonably achievable Automated Exposure Control was utilized for adjustment of the mA and/or KV according to patient size. DLP in mGycm= 513. CT angiography protocol. CT imaging with bolus IV contrast injection. Under concurrent supervision axial, sagittal, coronal, and three-dimensional data sets were constructed. Heart size is at the upper limits of normal. Thoracic aortic calcification with no aneurysm. Symmetric well opacified pulmonary arteries. No pulmonary embolism. Fully expanded lungs. No pneumonia, pneumothorax, or pleural effusion. Mild interstitial disease. Moderate thoracic spurring. Median sternotomy changes noted. 1. No pulmonary embolism. 2. No acute lung disease. Signed by Dr Keesha Guerra on 12/22/2020 5:22 PM      Assessment and Plan   1. Principal Problem:    Chest pain    Active Problems:    CAD (coronary artery disease)    Essential hypertension    Mixed hyperlipidemia    Hx of CABG    History of coronary artery stent placement    Diabetes mellitus type 2 with peripheral artery disease     Osteoarthritis    Treatment plan:  Admit to observation with continued cardiac monitoring, serial EKGs and serial cardiac isoenzymes  Formal cardiology consultation  Jayme Murphy stress test today  Cannot rule out GI etiology. Noted overlapping NSAID on admission list.  Await cardiology work-up and recommendations.   Continue course of care for now      [place]  .  12/23/2020     Ischemic W/U per Cards  D/C Mobic /Celebrex  Add Carafate  OK to D/C later today if cleared by Cards    I have discussed the care of Jacobo Fan, including pertinent history and exam findings with the ARNP/PA. I have seen and examined the patient and the key elements of all parts of the encounter have been performed by me. I agree with the assessment and plan as outlined by the ARNP/PA. Please refer to my separate note for complete documentation.      Electronically signed by Elbert Washington MD on 12/23/2020 at 1:28 PM

## 2020-12-23 NOTE — PROGRESS NOTES
Cardiology Daily Note Hayes Valles MD      Patient:  Tremaine Guy  [id number]    Patient Active Problem List    Diagnosis Date Noted    Abnormal nuclear cardiac imaging test      Priority: High    Chest pain 12/22/2020     Priority: Low    Arthritis of knee 07/20/2020     Priority: Low    Diabetes mellitus type 2 with peripheral artery disease ([facility] [address] 07/20/2020     Priority: Low    Primary osteoarthritis of right knee 07/19/2020     Priority: Low    Essential hypertension 06/29/2020     Priority: Low    Mixed hyperlipidemia 06/29/2020     Priority: Low    Hx of CABG 06/29/2020     Priority: Low    History of coronary artery stent placement 06/29/2020     Priority: Low    CAD (coronary artery disease)      Priority: Low    Degeneration of intervertebral disc at C4-C5 level 04/11/2019     Priority: Low    Cervical myelopathy ([facility] [address] 04/11/2019     Priority: Low       Admit Date:  12/22/2020    Admission Problem List: Present on Admission:   Chest pain   History of coronary artery stent placement   CAD (coronary artery disease)   Essential hypertension   Mixed hyperlipidemia   Hx of CABG   Diabetes mellitus type 2 with peripheral artery disease ([facility] [address]      Cardiac Specific Data:  Specialty Problems        Cardiology Problems    CAD (coronary artery disease)        Essential hypertension        Mixed hyperlipidemia        Diabetes mellitus type 2 with peripheral artery disease (HCC)        * (Principal) Chest pain              Subjective:  Mr. Reed Cj seen today resting comfortably. All troponins are negative. All symptoms have resolved. Vital signs stable blood pressure 133/76 heart 60. Lexiscan stress test showed normal ejection fraction probably normal perfusion study inferior attenuation unchanged from previous study minimal ischemia not excluded but he does not describe any exertional symptoms whatsoever.     Objective:   /76   Pulse 60   Temp 97.8 °F (36.6 °C) (Temporal)   Resp 16   Ht 6' (1.829 m)   Wt 240 lb (108.9 kg)   SpO2 95%   BMI 32.55 kg/m²       Intake/Output Summary (Last 24 hours) at 12/23/2020 1239  Last data filed at 12/22/2020 1419  Gross per 24 hour   Intake 120 ml   Output --   Net 120 ml       Prior to Admission medications    Medication Sig Start Date End Date Taking? Authorizing Provider   meloxicam (MOBIC) 15 MG tablet Take 15 mg by mouth daily   Yes Historical Provider, MD   celecoxib (CELEBREX) 200 MG capsule Take 200 mg by mouth 2 times daily   Yes Historical Provider, MD   traMADol (ULTRAM) 50 MG tablet Take 50 mg by mouth every 6 hours as needed for Pain. Patient does not know frequency ordered.    Yes Historical Provider, MD   aspirin 81 MG EC tablet Take 1 tablet by mouth 2 times daily 7/22/20 12/22/20 Yes Mary Damon PA-C   atorvastatin (LIPITOR) 80 MG tablet Take 1 tablet by mouth nightly 6/2/20  Yes Becky Jimenes MD   metoprolol succinate (TOPROL XL) 25 MG extended release tablet Take 1 tablet by mouth daily 6/3/20  Yes Becky Jimenes MD   metFORMIN (GLUCOPHAGE) 500 MG tablet HOLD METFORMIN FOR 48 HOURS AFTER THE CARDIAC CATHETERIZATION  Patient taking differently: Take 500 mg by mouth daily (with breakfast)  6/2/20  Yes Becky Jimenes MD   losartan-hydrochlorothiazide (HYZAAR) 100-25 MG per tablet Take 1 tablet by mouth daily   Yes Historical Provider, MD   prasugrel (EFFIENT) 10 MG TABS Take 1 tablet by mouth daily 6/26/20   MARIETTA Davidson - NP        enoxaparin  40 mg Subcutaneous Daily    famotidine  20 mg Oral BID    aspirin  81 mg Oral BID    atorvastatin  80 mg Oral Nightly    metFORMIN  500 mg Oral Daily with breakfast    metoprolol succinate  25 mg Oral Daily    prasugrel  10 mg Oral Daily    losartan  100 mg Oral Daily    And    hydroCHLOROthiazide  25 mg Oral Daily       TELEMETRY: Sinus     Physical Exam:      Physical Exam      General:  Awake, alert, NAD  Skin:  Warm and dry  Neck:  no jvd , no carotid bruits  Chest:  Clear to auscultation, no wheezing or rales  Cardiovascular:  RRR I1B5 no murmurs, clicks, gallups, or rubs  Abdomen:  Soft nontender, nondistended, bowel sounds present  Extremities:  Edema: none     Lab Data:  CBC:   Recent Labs     12/22/20  1049   WBC 8.3   HGB 14.8   HCT 46.6   MCV 85.3        BMP:   Recent Labs     12/22/20  1049      K 4.1      CO2 24   BUN 16   CREATININE 0.6     LIVER PROFILE:   Recent Labs     12/22/20  1049   AST 15   ALT 17   LIPASE 41   BILITOT 0.4   ALKPHOS 84     PT/INR:   Recent Labs     12/22/20  1049   PROTIME 13.6   INR 1.05     APTT: No results for input(s): APTT in the last 72 hours. BNP:  No results for input(s): BNP in the last 72 hours. CK, CKMB, Troponin: @LABRCNT (CKTOTAL:3, CKMB:3, TROPONINI:3)@    IMAGING:  Echo Complete 2d W Doppler W Color    Result Date: 12/23/2020  Transthoracic Echocardiography Report (TTE)  Demographics   Patient Name  Merry Morataya Date of Study          12/22/2020   MRN           [id number]        Gender                 Male   Date of Birth 1950    Room Number            MHL-0319   Age           79 year(s)   Height:       72 inches     Referring Physician    Alyce Coy MD   Weight:       240.01 pounds Sonographer            Glory Osorio, [facility]   BSA:          2.3 m^2       Interpreting Physician Alyce Coy MD   BMI:          32.55 kg/m^2  Procedure Type of Study   TTE procedure:ECHO NO CONTRAST WITH DOP/COLR. Study Location: Echo Lab Technical Quality: Adequate visualization Patient Status: Inpatient Rhythm: Within normal limits Indications:Chest pain. Conclusions   Summary  Mitral valve leaflets are mildly thickened with preserved leaflet  mobility. Mildly thickened aortic valve leaflets with preserved leaflet mobility. Tricuspid valve is structurally normal.  Trivial tricuspid regurgitation with estimated RVSP of 15 mm Hg. Mildly dilated left atrium.   Normal left ventricular size with preserved LV function and an estimated  ejection fraction of approximately 55-60%. Impaired relaxation compatible with diastolic dysfunction. ( reversed E/A  ratio)  Mild concentric left ventricular hypertrophy. No regional wall motion abnormalities. Signature   ----------------------------------------------------------------  Electronically signed by Jaya Venegas MD(Interpreting  physician) on 12/23/2020 10:30 AM  ----------------------------------------------------------------   Findings   Mitral Valve  Mitral valve leaflets are mildly thickened with preserved leaflet  mobility. Aortic Valve  Mildly thickened aortic valve leaflets with preserved leaflet mobility. Tricuspid Valve  Tricuspid valve is structurally normal.  Trivial tricuspid regurgitation with estimated RVSP of 15 mm Hg. Pulmonic Valve  The pulmonic valve was not well visualized . Left Atrium  Mildly dilated left atrium. Left Ventricle  Normal left ventricular size with preserved LV function and an estimated  ejection fraction of approximately 55-60%. Impaired relaxation compatible with diastolic dysfunction. ( reversed E/A  ratio)  Mild concentric left ventricular hypertrophy. No regional wall motion abnormalities. Right Atrium  Normal right atrial dimension with no evidence of thrombus or mass noted. Right Ventricle  Normal right ventricular size with preserved RV function. Pericardial Effusion  No evidence of significant pericardial effusion is noted. Pleural Effusion  No evidence of pleural effusion.   M-Mode Measurements (cm)   LVIDd: 5.1 cm                        LVIDs: 3.43 cm  IVSd: 1.11 cm  LVPWd: 1.25 cm                       AO Root Dimension: 2.7 cm  % Ejection Fraction: 60 %            LA: 3.7 cm                                       LVOT: 2.1 cm  Doppler Measurements:   AV Peak Gradient: 10.63 mmHg        MV Peak E-Wave: 66.4 cm/s                                      MV Peak A-Wave: 87.8 cm/s  TR Velocity:159 cm/s                MV E/A Ratio: 0.76 %  TR Gradient:10.11 mmHg              MV Peak Gradient: 1.76 mmHg  Estimated RAP:5 mmHg  RVSP:15 mmHg      Xr Chest Portable    Result Date: 12/22/2020  Examination. XR CHEST PORTABLE 12/22/2020 9:31 AM History: Chest pain. A single frontal portable upright view of the chest is compared with the previous study dated 6/1/2020. The lungs are poorly inflated. There is a persistent moderate elevation right diaphragm. There is no evidence of recent infiltrate, pleural effusion, pulmonary congestion or pneumothorax. The heart size is not evaluated due to the portable projection. There is evidence of previous cardiac surgery. The hardware fusion of the cervical spine is partially visualized. No acute bony abnormality. No active cardiopulmonary disease. Signed by Dr Nellie Dey on 12/22/2020 10:42 AM    Cta Pulmonary W Contrast    Result Date: 12/22/2020  CTA PULMONARY W CONTRAST 12/22/2020 5:20 PM History: Chest pain and short of breath. Elevated d-dimer. In order to have a CT radiation dose as low as reasonably achievable Automated Exposure Control was utilized for adjustment of the mA and/or KV according to patient size. DLP in mGycm= 513. CT angiography protocol. CT imaging with bolus IV contrast injection. Under concurrent supervision axial, sagittal, coronal, and three-dimensional data sets were constructed. Heart size is at the upper limits of normal. Thoracic aortic calcification with no aneurysm. Symmetric well opacified pulmonary arteries. No pulmonary embolism. Fully expanded lungs. No pneumonia, pneumothorax, or pleural effusion. Mild interstitial disease. Moderate thoracic spurring. Median sternotomy changes noted. 1. No pulmonary embolism. 2. No acute lung disease.  Signed by Dr Nubia James on 12/22/2020 5:22 PM    Nm Myocardial Spect Rest Exercise Or Rx    Result Date: 12/23/2020  Lexiscan Nuclear Stress Test Report Procedure date: 12/23/20 Indications: chest pain Procedure: Stress was performed with injection of 0.4 mg Lexiscan. Vital signs and EKG were monitored. Technetium-99 sestamibi was injected in divided doses, 10.69 mCi and 31.5 mCi respectively for rest and stress imaging. The patient was discharged in stable condition. Results: Patient had symptoms of dyspnea, abdominal cramping, headache during infusion that resolved in recovery. Baseline EKG showed normal sinus rhythm without ST/T changes. During stress there were no significant EKG changes or rhythm changes. Baseline and peak blood pressures were 146/86, and 151/74 respectively. Baseline and peak heart rates were 61 and  91 respectively. Review of rest and stress images obtained utilizing a gated SPECT acquisition protocol along with review of the polar plot revealed: 1. Ejection fraction 56% 2. Wall motion study unremarkable 3. Myocardial perfusion imaging demonstrated mildly reduced uptake in the inferior wall the sum stress score was 4 the sum difference score was 4. Summary impressions: Normal ejection fraction 56% borderline perfusion study most likely normal minimal degree of inferior ischemia not entirely excluded correlate clinically. Signed by Dr Crispin Morfin on 12/23/2020 11:09 AM        Assessment:  1. Complaints of shortness of breath and atypical chest pain worse with a deep breath and movement etiology not defined at this time  2. Obesity  3. Coronary artery disease  4. Previous CABG times 4/19/1999  5. History of coronary artery stent  6. Tobacco abuse discontinued 1990s  7. No typical exertional angina  8. Complaints of exertional dyspnea  9. Diabetes mellitus type 2  10. Cervical myelopathy  11. Hypertension  12. Osteoarthritis  13. Previous Lexiscan stress test 3/13/2020 diaphragmatic attenuation versus inferior scar no ischemia EF 52%  14. CT of the head 4/29/2015 mild atrophy atheromatous calcification left vertebral cavernous carotid arteries no acute findings  15.  Lexiscan stress test ejection fraction 56% probably normal inferior attenuation unchanged from previous study minimal ischemia not entirely excluded    Plan:  1.  Stable from a cardiac standpoint all symptoms have resolved all troponins are negative and Lexiscan perfusion study probably normal I believe can be discharged from our standpoint we will follow-up in the office in 1 month    Daren Phillip MD 12/23/2020 12:39 PM

## 2020-12-23 NOTE — PROGRESS NOTES
Patient back from [place]. Paged Adelaide Hilton with Dr. Caban Loges regarding releasing tray. Awaiting orders.

## 2021-01-04 LAB
LV EF: 56 %
LVEF MODALITY: NORMAL

## 2021-01-29 ENCOUNTER — TELEPHONE (OUTPATIENT)
Dept: CARDIOLOGY CLINIC | Age: 71
End: 2021-01-29

## 2021-02-01 ENCOUNTER — OFFICE VISIT (OUTPATIENT)
Dept: CARDIOLOGY CLINIC | Age: 71
End: 2021-02-01
Payer: MEDICARE

## 2021-02-01 VITALS
HEART RATE: 68 BPM | WEIGHT: 246 LBS | BODY MASS INDEX: 33.32 KG/M2 | SYSTOLIC BLOOD PRESSURE: 138 MMHG | HEIGHT: 72 IN | DIASTOLIC BLOOD PRESSURE: 70 MMHG

## 2021-02-01 DIAGNOSIS — E78.2 MIXED HYPERLIPIDEMIA: ICD-10-CM

## 2021-02-01 DIAGNOSIS — I10 ESSENTIAL HYPERTENSION: ICD-10-CM

## 2021-02-01 DIAGNOSIS — Z95.1 HX OF CABG: ICD-10-CM

## 2021-02-01 DIAGNOSIS — I20.8 OTHER FORMS OF ANGINA PECTORIS (HCC): Primary | ICD-10-CM

## 2021-02-01 DIAGNOSIS — Z95.5 HISTORY OF CORONARY ARTERY STENT PLACEMENT: ICD-10-CM

## 2021-02-01 PROCEDURE — 99213 OFFICE O/P EST LOW 20 MIN: CPT | Performed by: INTERNAL MEDICINE

## 2021-02-01 PROCEDURE — 1036F TOBACCO NON-USER: CPT | Performed by: INTERNAL MEDICINE

## 2021-02-01 PROCEDURE — 4040F PNEUMOC VAC/ADMIN/RCVD: CPT | Performed by: INTERNAL MEDICINE

## 2021-02-01 PROCEDURE — 3017F COLORECTAL CA SCREEN DOC REV: CPT | Performed by: INTERNAL MEDICINE

## 2021-02-01 PROCEDURE — 1123F ACP DISCUSS/DSCN MKR DOCD: CPT | Performed by: INTERNAL MEDICINE

## 2021-02-01 PROCEDURE — G8484 FLU IMMUNIZE NO ADMIN: HCPCS | Performed by: INTERNAL MEDICINE

## 2021-02-01 PROCEDURE — G8427 DOCREV CUR MEDS BY ELIG CLIN: HCPCS | Performed by: INTERNAL MEDICINE

## 2021-02-01 PROCEDURE — G8417 CALC BMI ABV UP PARAM F/U: HCPCS | Performed by: INTERNAL MEDICINE

## 2021-02-01 ASSESSMENT — ENCOUNTER SYMPTOMS
RESPIRATORY NEGATIVE: 1
VOMITING: 0
SHORTNESS OF BREATH: 0
DIARRHEA: 0
GASTROINTESTINAL NEGATIVE: 1
EYES NEGATIVE: 1
NAUSEA: 0

## 2021-02-01 NOTE — PROGRESS NOTES
 Degeneration of intervertebral disc at C4-C5 level 4/11/2019    Diabetes mellitus ([address]     Hyperlipidemia     Hypertension     Knee pain      Past Surgical History:   Procedure Laterality Date    BACK SURGERY      CARDIAC SURGERY  01/01/1999    CABG X 4VLIMA to LAD, SVG to OM, diag 1 and RCA    CERVICAL FUSION N/A 4/11/2019    C3-4 C4-5 ACDF performed by Abraham Soler MD at [address] [place] [address] CATH LAB PROCEDURE      KNEE SURGERY Right     scope, age 35ish    [facility] TOTAL KNEE ARTHROPLASTY Right 7/20/2020    RIGHT TOTAL KNEE REPLACEMENT performed by Damon Perkins MD at [facility] OR     Family History   Problem Relation Age of Onset    Heart Disease Mother     Diabetes Mother     Cancer Father         LUNG CA     No Known Allergies  Current Outpatient Medications   Medication Sig Dispense Refill    nitroGLYCERIN (NITROSTAT) 0.4 MG SL tablet Place 1 tablet under the tongue every 5 minutes as needed for Chest pain 25 tablet 3    traMADol (ULTRAM) 50 MG tablet Take 50 mg by mouth every 6 hours as needed for Pain. Patient does not know frequency ordered.       atorvastatin (LIPITOR) 80 MG tablet Take 1 tablet by mouth nightly (Patient taking differently: Take 40 mg by mouth nightly ) 30 tablet 3    metoprolol succinate (TOPROL XL) 25 MG extended release tablet Take 1 tablet by mouth daily 30 tablet 3    metFORMIN (GLUCOPHAGE) 500 MG tablet HOLD METFORMIN FOR 48 HOURS AFTER THE CARDIAC CATHETERIZATION (Patient taking differently: Take 500 mg by mouth daily (with breakfast) ) 60 tablet 3    famotidine (PEPCID) 20 MG tablet Take 1 tablet by mouth 2 times daily (Patient not taking: Reported on 2/1/2021) 60 tablet 3    sucralfate (CARAFATE) 1 GM tablet Take 1 tablet by mouth 4 times daily (Patient not taking: Reported on 2/1/2021) 120 tablet 3    aspirin 81 MG EC tablet Take 1 tablet by mouth 2 times daily 80 tablet 0  prasugrel (EFFIENT) 10 MG TABS Take 1 tablet by mouth daily (Patient not taking: Reported on 2021) 90 tablet 3    losartan-hydrochlorothiazide (HYZAAR) 100-25 MG per tablet Take 1 tablet by mouth daily       No current facility-administered medications for this visit. Social History     Socioeconomic History    Marital status:      Spouse name: Sunil Parks Number of children: Not on file    Years of education: Not on file    Highest education level: Not on file   Occupational History    Not on file   Social Needs    Financial resource strain: Not on file    Food insecurity     Worry: Not on file     Inability: Not on file    Transportation needs     Medical: Not on file     Non-medical: Not on file   Tobacco Use    Smoking status: Former Smoker     Types: Cigarettes     Quit date: 1995     Years since quittin.8    Smokeless tobacco: Never Used   Substance and Sexual Activity    Alcohol use: No    Drug use: No    Sexual activity: Not on file   Lifestyle    Physical activity     Days per week: Not on file     Minutes per session: Not on file    Stress: Not on file   Relationships    Social connections     Talks on phone: Not on file     Gets together: Not on file     Attends Orthodoxy service: Not on file     Active member of club or organization: Not on file     Attends meetings of clubs or organizations: Not on file     Relationship status: Not on file    Intimate partner violence     Fear of current or ex partner: Not on file     Emotionally abused: Not on file     Physically abused: Not on file     Forced sexual activity: Not on file   Other Topics Concern    Not on file   Social History Narrative    Not on file       Physical Examination:  /70   Pulse 68   Ht 6' (1.829 m)   Wt 246 lb (111.6 kg)   BMI 33.36 kg/m²   Physical Exam  Vitals signs reviewed. Constitutional:       Appearance: He is well-developed. Neck:      Vascular: No carotid bruit or JVD. Cardiovascular:      Rate and Rhythm: Normal rate and regular rhythm. Heart sounds: Normal heart sounds. No murmur. No friction rub. No gallop. Pulmonary:      Effort: Pulmonary effort is normal. No respiratory distress. Breath sounds: Normal breath sounds. No wheezing or rales. Abdominal:      General: There is no distension. Tenderness: There is no abdominal tenderness. Lymphadenopathy:      Cervical: No cervical adenopathy. Skin:     General: Skin is warm and dry. ASSESSMENT:     Diagnosis Orders   1. Other forms of angina pectoris ([address]     2. Essential hypertension     3. Mixed hyperlipidemia     4. History of coronary artery stent placement     5. Hx of CABG         PLAN:  No orders of the defined types were placed in this encounter. No orders of the defined types were placed in this encounter. 1. Continue present medications  2. Recommend follow-up assessment in 6 months    Return in about 6 months (around 8/1/2021) for return to Dr. Urmila Christian only. Marlen Betancourt MD 2/1/2021 2:38 PM [facility]      Thisdictation was generated by voice recognition computer software. Although all attempts are made to edit the dictation for accuracy, there may be errors in the transcription that are not intended.

## 2021-07-21 ENCOUNTER — APPOINTMENT (OUTPATIENT)
Dept: GENERAL RADIOLOGY | Age: 71
End: 2021-07-21
Payer: MEDICARE

## 2021-07-21 ENCOUNTER — APPOINTMENT (OUTPATIENT)
Dept: CT IMAGING | Age: 71
End: 2021-07-21
Payer: MEDICARE

## 2021-07-21 ENCOUNTER — HOSPITAL ENCOUNTER (EMERGENCY)
Age: 71
Discharge: HOME | End: 2021-07-21
Attending: EMERGENCY MEDICINE
Payer: MEDICARE

## 2021-07-21 VITALS
HEART RATE: 72 BPM | TEMPERATURE: 98.2 F | WEIGHT: 250 LBS | HEIGHT: 73 IN | DIASTOLIC BLOOD PRESSURE: 60 MMHG | SYSTOLIC BLOOD PRESSURE: 126 MMHG | OXYGEN SATURATION: 96 % | BODY MASS INDEX: 33.13 KG/M2 | RESPIRATION RATE: 17 BRPM

## 2021-07-21 DIAGNOSIS — R93.89 ABNORMAL CT SCAN: ICD-10-CM

## 2021-07-21 DIAGNOSIS — S76.312A TEAR OF LEFT HAMSTRING, INITIAL ENCOUNTER: ICD-10-CM

## 2021-07-21 DIAGNOSIS — S16.1XXA STRAIN OF NECK MUSCLE, INITIAL ENCOUNTER: ICD-10-CM

## 2021-07-21 DIAGNOSIS — M25.512 ACUTE PAIN OF LEFT SHOULDER: ICD-10-CM
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
## 2021-07-21 PROCEDURE — 72131 CT LUMBAR SPINE W/O DYE: CPT

## 2021-07-21 PROCEDURE — 96372 THER/PROPH/DIAG INJ SC/IM: CPT

## 2021-07-21 PROCEDURE — 70450 CT HEAD/BRAIN W/O DYE: CPT

## 2021-07-21 PROCEDURE — 73030 X-RAY EXAM OF SHOULDER: CPT

## 2021-07-21 PROCEDURE — 72192 CT PELVIS W/O DYE: CPT

## 2021-07-21 PROCEDURE — 6360000002 HC RX W HCPCS: Performed by: EMERGENCY MEDICINE

## 2021-07-21 PROCEDURE — 71045 X-RAY EXAM CHEST 1 VIEW: CPT

## 2021-07-21 PROCEDURE — 72125 CT NECK SPINE W/O DYE: CPT

## 2021-07-21 PROCEDURE — 99284 EMERGENCY DEPT VISIT MOD MDM: CPT

## 2021-07-21 PROCEDURE — 73502 X-RAY EXAM HIP UNI 2-3 VIEWS: CPT

## 2021-07-21 RX ORDER — ONDANSETRON 2 MG/ML
4 INJECTION INTRAMUSCULAR; INTRAVENOUS ONCE
Status: COMPLETED | OUTPATIENT
Start: 2021-07-21 | End: 2021-07-21

## 2021-07-21 RX ORDER — HYDROCODONE BITARTRATE AND ACETAMINOPHEN 5; 325 MG/1; MG/1
1 TABLET ORAL EVERY 6 HOURS PRN
Qty: 12 TABLET | Refills: 0 | Status: ON HOLD | OUTPATIENT
Start: 2021-07-21 | End: 2021-07-23 | Stop reason: ALTCHOICE

## 2021-07-21 RX ORDER — MORPHINE SULFATE 4 MG/ML
4 INJECTION, SOLUTION INTRAMUSCULAR; INTRAVENOUS ONCE
Status: COMPLETED | OUTPATIENT
Start: 2021-07-21 | End: 2021-07-21

## 2021-07-21 RX ORDER — HYDROMORPHONE HYDROCHLORIDE 1 MG/ML
1 INJECTION, SOLUTION INTRAMUSCULAR; INTRAVENOUS; SUBCUTANEOUS ONCE
Status: COMPLETED | OUTPATIENT
Start: 2021-07-21 | End: 2021-07-21

## 2021-07-21 RX ADMIN — MORPHINE SULFATE 4 MG: 4 INJECTION, SOLUTION INTRAMUSCULAR; INTRAVENOUS at 13:55

## 2021-07-21 RX ADMIN — HYDROMORPHONE HYDROCHLORIDE 1 MG: 1 INJECTION, SOLUTION INTRAMUSCULAR; INTRAVENOUS; SUBCUTANEOUS at 15:43

## 2021-07-21 RX ADMIN — ONDANSETRON 4 MG: 2 INJECTION INTRAMUSCULAR; INTRAVENOUS at 13:49

## 2021-07-21 ASSESSMENT — PAIN SCALES - GENERAL
PAINLEVEL_OUTOF10: 10
PAINLEVEL_OUTOF10: 3
PAINLEVEL_OUTOF10: 3

## 2021-07-21 ASSESSMENT — ENCOUNTER SYMPTOMS
EYE PAIN: 0
ABDOMINAL PAIN: 0
SHORTNESS OF BREATH: 0
VOMITING: 0
BACK PAIN: 1
DIARRHEA: 0

## 2021-07-21 NOTE — ED PROVIDER NOTES
[facility] EMERGENCY DEPT  eMERGENCY dEPARTMENT eNCOUnter      Pt Name: Hakan Oliavrez  MRN: [id number]  [place] 1950  Date of evaluation: 7/21/2021  Provider: Jacob Frank MD    [address] [place] [address]       Chief Complaint   Patient presents with    Fall         HISTORY OF PRESENT ILLNESS   (Location/Symptom, Timing/Onset,Context/Setting, Quality, Duration, Modifying Factors, Severity)  Note limiting factors. Hakan Olivarez is a 79 y.o. male who presents to the emergency department due to fall. Patient was standing at the bottom of the ladder. His son was up on the ladder and he said is a very large man and fell off ladder and landed on him. This caused patient to basically crumpled to the ground. Does not know if he hit his head but almost had loss of consciousness. No headache since. Pain in the left shoulder neck, low back and left hip. Jeoffrey Mortimer it was hard to walk because of pain left hip. Has some pain in the back of his left leg that he feels like is a muscle strain but no pain in the femur or knee or anywhere else in the leg distal to the hip. HPI    NursingNotes were reviewed. REVIEW OF SYSTEMS    (2-9 systems for level 4, 10 or more for level 5)     Review of Systems   Constitutional: Negative for fever. Eyes: Negative for pain and visual disturbance. Respiratory: Negative for shortness of breath. Cardiovascular: Negative for chest pain and palpitations. Gastrointestinal: Negative for abdominal pain, diarrhea and vomiting. Genitourinary: Negative for dysuria and flank pain. Musculoskeletal: Positive for back pain (L spine only) and neck pain. Skin: Negative for rash. Neurological: Negative for syncope, weakness and headaches. All other systems reviewed and are negative. A complete review of systems was performed and is negative except as noted above in the HPI.        PAST MEDICAL HISTORY     Past Medical History:   Diagnosis Date    CAD (coronary artery disease) recent stent per violeta    Cervical myelopathy ([facility] 4/11/2019    Degeneration of intervertebral disc at C4-C5 level 4/11/2019    Diabetes mellitus ([facility]     Hyperlipidemia     Hypertension     Knee pain          SURGICAL HISTORY       Past Surgical History:   Procedure Laterality Date    BACK SURGERY      CARDIAC SURGERY  01/01/1999    CABG X 4VLIMA to LAD, SVG to OM, diag 1 and RCA    CERVICAL FUSION N/A 4/11/2019    C3-4 C4-5 ACDF performed by Nadeen Mccord MD at [address] [place] [address] CATH LAB PROCEDURE      KNEE SURGERY Right     scope, age 35ish    [facility] [place] TOTAL KNEE ARTHROPLASTY Right 7/20/2020    RIGHT TOTAL KNEE REPLACEMENT performed by Pelon Hewitt MD at [facility]       Discharge Medication List as of 7/21/2021  5:38 PM      CONTINUE these medications which have NOT CHANGED    Details   nitroGLYCERIN (NITROSTAT) 0.4 MG SL tablet Place 1 tablet under the tongue every 5 minutes as needed for Chest pain, Disp-25 tablet, R-3Normal      famotidine (PEPCID) 20 MG tablet Take 1 tablet by mouth 2 times daily, Disp-60 tablet, R-3Normal      sucralfate (CARAFATE) 1 GM tablet Take 1 tablet by mouth 4 times daily, Disp-120 tablet, R-3Normal      traMADol (ULTRAM) 50 MG tablet Take 50 mg by mouth every 6 hours as needed for Pain. Patient does not know frequency ordered. Historical Med      aspirin 81 MG EC tablet Take 1 tablet by mouth 2 times daily, Disp-80 tablet, R-0Print      prasugrel (EFFIENT) 10 MG TABS Take 1 tablet by mouth daily, Disp-90 tablet, R-3Normal      atorvastatin (LIPITOR) 80 MG tablet Take 1 tablet by mouth nightly, Disp-30 tablet, R-3Normal      metoprolol succinate (TOPROL XL) 25 MG extended release tablet Take 1 tablet by mouth daily, Disp-30 tablet, R-3Normal      metFORMIN (GLUCOPHAGE) 500 MG tablet HOLD METFORMIN FOR 48 HOURS AFTER THE CARDIAC CATHETERIZATION, Disp-60 tablet, R-3Normal      losartan-hydrochlorothiazide (HYZAAR) 100-25 MG per tablet Take 1 tablet by mouth dailyHistorical Med             ALLERGIES     Patient has no known allergies. FAMILY HISTORY       Family History   Problem Relation Age of Onset    Heart Disease Mother     Diabetes Mother     Cancer Father         LUNG CA          SOCIAL HISTORY       Social History     Socioeconomic History    Marital status:      Spouse name: Suzi Hinson Number of children: Not on file    Years of education: Not on file    Highest education level: Not on file   Occupational History    Not on file   Tobacco Use    Smoking status: Former Smoker     Types: Cigarettes     Quit date: 1995     Years since quittin.3    Smokeless tobacco: Never Used   Vaping Use    Vaping Use: Never used   Substance and Sexual Activity    Alcohol use: No    Drug use: No    Sexual activity: Not on file   Other Topics Concern    Not on file   Social History Narrative    Not on file     Social Determinants of Health     Financial Resource Strain:     Difficulty of Paying Living Expenses:    Food Insecurity:     Worried About Running Out of Food in the Last Year:     [address] in the Last Year:    Transportation Needs:     Lack of Transportation (Medical):      Lack of Transportation (Non-Medical):    Physical Activity:     Days of Exercise per Week:     Minutes of Exercise per Session:    Stress:     Feeling of Stress :    Social Connections:     Frequency of Communication with Friends and Family:     Frequency of Social Gatherings with Friends and Family:     Attends Anab[employer] Services:     Active Member of Clubs or Organizations:     Attends Club or Organization Meetings:     Marital Status:    Intimate Partner Violence:     Fear of Current or Ex-Partner:     Emotionally Abused:     Physically Abused:     Sexually Abused:        SCREENINGS    Ginny Coma Scale  Eye Opening: Spontaneous  Best Verbal Response: Oriented  Best Motor Response: Obeys commands  Ginny Coma Scale Score: 15        PHYSICAL EXAM    (up to 7 for level 4, 8 or more for level 5)     ED Triage Vitals [07/21/21 1231]   BP Temp Temp src Pulse Resp SpO2 Height Weight   115/72 98.2 °F (36.8 °C) -- 73 20 94 % 6' 1\" (1.854 m) 250 lb (113.4 kg)       Physical Exam  Vitals reviewed. Constitutional:       General: He is not in acute distress. Appearance: He is well-developed. HENT:      Head: Normocephalic and atraumatic. Right Ear: External ear normal.      Left Ear: External ear normal.      Ears:      Comments: No hemotympanum     Mouth/Throat:      Mouth: Mucous membranes are moist.      Pharynx: Oropharynx is clear. Eyes:      General: No scleral icterus. Extraocular Movements: Extraocular movements intact. Pupils: Pupils are equal, round, and reactive to light. Comments: No hyphema   Neck:      Vascular: No JVD. Cardiovascular:      Rate and Rhythm: Normal rate and regular rhythm. Pulses: Normal pulses. Heart sounds: Normal heart sounds. Pulmonary:      Effort: Pulmonary effort is normal. No respiratory distress. Breath sounds: Normal breath sounds. Chest:      Chest wall: No tenderness. Abdominal:      General: There is no distension. Palpations: Abdomen is soft. Tenderness: There is no abdominal tenderness. There is no right CVA tenderness, left CVA tenderness, guarding or rebound. Musculoskeletal:         General: No swelling or deformity. Normal range of motion. Left shoulder: Tenderness present. No swelling, deformity, effusion or crepitus. Normal range of motion. Normal strength. Normal pulse. Left upper arm: Normal.      Left elbow: Normal.      Left forearm: Normal.      Left wrist: Normal.      Left hand: Normal.      Cervical back: Neck supple. Tenderness present. Left hip: Tenderness present. No deformity or crepitus. Normal strength.       Left upper leg: Normal.      Left knee: Normal.      Left lower leg: Normal.      Left ankle: Normal.      Left foot: Normal.      Comments: Mild tenderness in the C and L-spine without obvious deformity or step-off. No tenderness in the T-spine. Pelvis stable. Mild tenderness left hip without obvious deformity. Normal range of motion. No tenderness in the femur or anywhere else distally left lower extremity. Does have some mild discomfort in the back of the left thigh but no tenderness. Mild pain/tenderness left shoulder with normal range of motion. No other tenderness/injury distal to shoulder and left upper extremity. Neurovascular intact distally all 4 extremities. Skin:     General: Skin is warm and dry. Neurological:      Mental Status: He is alert and oriented to person, place, and time. Psychiatric:         Behavior: Behavior normal.         DIAGNOSTIC RESULTS       RADIOLOGY:   Non-plain film images such as CT, Ultrasound and MRI are read by the radiologist. Armond Soler images are visualized and preliminarily interpreted by the emergency physician with the below findings:    Interpretation per the Radiologist below, if available at the time of this note:    CT PELVIS WO CONTRAST Additional Contrast? None   Final Result   1. No acute pelvic or hip fracture. 2.  Acute LEFT hamstring tear suspected given thickened inflamed   tendon and inflammation in the proximal muscle body. 3.  Trace amount of LEFT gluteal soft tissue gas, correlate with   history of subcutaneous injection or soft tissue injury. Signed by Dr Beronica Cheney   Final Result   No acute findings. Signed by Dr Ebony Carey      XR SHOULDER LEFT (MIN 2 VIEWS)   Final Result   Moderate AC joint arthrosis, probable subacromial   impingement. No acute fracture. Signed by Dr Harley Kwan. [place]      XR HIP 2-3 VW W PELVIS LEFT   Final Result   No acute osseous findings.    Signed by Dr Jacqueline Slater   Final Result Impression:    1. A mild compression deformity involving the upper endplate of L1 is   present-felt to be chronic in nature. No acute fracture lines are   present. 2. There is facet and ligamentous hypertrophy as well as bulging of   the disc with disc osteophyte complex at L3-L4 contributing to central   and foraminal stenosis as described above. Foraminal narrowing is also   noted at other levels as described above. 3. Status post fusion at the L4-S1 level without evidence of   complications. The patient is status post bilateral laminectomy at L4   and L5. .   Signed by Dr Twila Rodriguez   Final Result   1. No acute fracture or subluxation. 2.  Postoperative change of C3-C4-C5 ACDF without evidence of   complication. 3.  Multilevel cervical spine degenerative change. Signed by Dr Jerome Nye   Final Result   1. Mild cerebral and cerebellar volume loss with chronic microvascular   disease but no evidence of acute intracranial process. 2. Extra-axial calcification within the right frontal convexity   suspicious for a 1 cm partially calcified meningioma. Signed by Dr Birgit Adame and DIFFERENTIALDIAGNOSIS/MDM:   Vitals:    Vitals:    07/21/21 1400 07/21/21 1500 07/21/21 1600 07/21/21 1700   BP: (!) 109/57  (!) 122/58 126/60   Pulse: 72 76 74 72   Resp: 18 18 18 17   Temp:       SpO2: 95% 96% 96% 96%   Weight:       Height:           MDM  Gas in the soft tissues of the left gluteal region is likely from IM injection. Lots of findings on imaging that are likely chronic findings. Only thing that looks like a probable acute injury is probable left hamstring tear which is consistent with the pain he has in the back of his left thigh. Discussed incidental findings with patient and told him to follow-up with primary care and his orthopedist to discuss/review all of his imaging results from today.   He already sees Dr. Stephy Cervantes. I spoke with Dr. Louis Phelps, on-call orthopedist, concerning the left hamstring tear and he said the patient may weight-bear as tolerated and use a walker. Patient said he already has a walker at home. Is able to ambulate with a walker here in the department and has no other complaints on repeat exam.  Plan discussed with patient who is agreeable with the plan. He will follow-up with primary care and Dr. Stephy Cervantes who he actually already had an appointment with this week. Told to return to the ER if he has change worsening symptoms or new concerns. Patient agreeable plan. CONSULTS:  None    PROCEDURES:  Unless otherwise notedbelow, none     Procedures    FINAL IMPRESSION     1. Fall, initial encounter    2. Acute pain of left shoulder    3. Tear of left hamstring, initial encounter    4. Strain of neck muscle, initial encounter    5. Abnormal CT scan          DISPOSITION/PLAN   DISPOSITION Decision To Discharge 07/21/2021 05:35:38 PM      PATIENT REFERRED TO:  @FUP@    DISCHARGE MEDICATIONS:  Discharge Medication List as of 7/21/2021  5:38 PM      START taking these medications    Details   HYDROcodone-acetaminophen (NORCO) 5-325 MG per tablet Take 1 tablet by mouth every 6 hours as needed for Pain for up to 3 days. Intended supply: 3 days.  Take lowest dose possible to manage pain, Disp-12 tablet, R-0Normal                (Please note that portions of this note were completed with a voice recognition program.  Efforts were made to edit the dictations butoccasionally words are mis-transcribed.)    Doron Childs MD (electronically signed)  AttendingEmergency Physician         Doron Childs MD  07/22/21 1200

## 2021-07-23 ENCOUNTER — HOSPITAL ENCOUNTER (INPATIENT)
Age: 71
LOS: 1 days | Discharge: HOME | DRG: 914 | End: 2021-07-27
Attending: FAMILY MEDICINE | Admitting: FAMILY MEDICINE
Payer: MEDICARE

## 2021-07-23 DIAGNOSIS — M50.321 DEGENERATION OF INTERVERTEBRAL DISC AT C4-C5 LEVEL: Primary | ICD-10-CM

## 2021-07-23 DIAGNOSIS — G95.9 CERVICAL MYELOPATHY (HCC): ICD-10-CM

## 2021-07-23 PROBLEM — S76.312A TEAR OF LEFT HAMSTRING: Status: ACTIVE | Noted: 2021-07-23

## 2021-07-23 LAB
ANION GAP SERPL CALCULATED.3IONS-SCNC: 9 MMOL/L (ref 7–19)
BUN BLDV-MCNC: 12 MG/DL (ref 8–23)
CALCIUM SERPL-MCNC: 9.8 MG/DL (ref 8.8–10.2)
CHLORIDE BLD-SCNC: 102 MMOL/L (ref 98–111)
CO2: 25 MMOL/L (ref 22–29)
CREAT SERPL-MCNC: 0.7 MG/DL (ref 0.5–1.2)
GFR AFRICAN AMERICAN: >59
GFR NON-AFRICAN AMERICAN: >60
GLUCOSE BLD-MCNC: 121 MG/DL (ref 74–109)
HCT VFR BLD CALC: 41.1 % (ref 42–52)
HEMOGLOBIN: 14.1 G/DL (ref 14–18)
MCH RBC QN AUTO: 30 PG (ref 27–31)
MCHC RBC AUTO-ENTMCNC: 34.3 G/DL (ref 33–37)
MCV RBC AUTO: 87.4 FL (ref 80–94)
PDW BLD-RTO: 13.4 % (ref 11.5–14.5)
PLATELET # BLD: 139 K/UL (ref 130–400)
PMV BLD AUTO: 12.3 FL (ref 9.4–12.4)
POTASSIUM REFLEX MAGNESIUM: 4.3 MMOL/L (ref 3.5–5)
RBC # BLD: 4.7 M/UL (ref 4.7–6.1)
SODIUM BLD-SCNC: 136 MMOL/L (ref 136–145)
WBC # BLD: 6.6 K/UL (ref 4.8–10.8)

## 2021-07-23 PROCEDURE — 36415 COLL VENOUS BLD VENIPUNCTURE: CPT

## 2021-07-23 PROCEDURE — 6360000002 HC RX W HCPCS: Performed by: FAMILY MEDICINE

## 2021-07-23 PROCEDURE — 96374 THER/PROPH/DIAG INJ IV PUSH: CPT

## 2021-07-23 PROCEDURE — G0378 HOSPITAL OBSERVATION PER HR: HCPCS

## 2021-07-23 PROCEDURE — 2580000003 HC RX 258: Performed by: FAMILY MEDICINE

## 2021-07-23 PROCEDURE — G0379 DIRECT REFER HOSPITAL OBSERV: HCPCS

## 2021-07-23 PROCEDURE — 80048 BASIC METABOLIC PNL TOTAL CA: CPT

## 2021-07-23 PROCEDURE — 6370000000 HC RX 637 (ALT 250 FOR IP): Performed by: FAMILY MEDICINE

## 2021-07-23 PROCEDURE — 85027 COMPLETE CBC AUTOMATED: CPT

## 2021-07-23 RX ORDER — ACETAMINOPHEN 650 MG/1
650 SUPPOSITORY RECTAL EVERY 6 HOURS PRN
Status: DISCONTINUED | OUTPATIENT
Start: 2021-07-23 | End: 2021-07-27 | Stop reason: HOSPADM

## 2021-07-23 RX ORDER — SODIUM CHLORIDE 9 MG/ML
INJECTION, SOLUTION INTRAVENOUS CONTINUOUS
Status: DISCONTINUED | OUTPATIENT
Start: 2021-07-23 | End: 2021-07-26

## 2021-07-23 RX ORDER — ATORVASTATIN CALCIUM 10 MG/1
10 TABLET, FILM COATED ORAL DAILY
Status: DISCONTINUED | OUTPATIENT
Start: 2021-07-23 | End: 2021-07-27 | Stop reason: HOSPADM

## 2021-07-23 RX ORDER — HYDROCODONE BITARTRATE AND ACETAMINOPHEN 5; 325 MG/1; MG/1
1 TABLET ORAL EVERY 6 HOURS PRN
Status: ON HOLD | COMMUNITY
End: 2021-07-27 | Stop reason: HOSPADM

## 2021-07-23 RX ORDER — SODIUM CHLORIDE 0.9 % (FLUSH) 0.9 %
5-40 SYRINGE (ML) INJECTION PRN
Status: DISCONTINUED | OUTPATIENT
Start: 2021-07-23 | End: 2021-07-27 | Stop reason: HOSPADM

## 2021-07-23 RX ORDER — ONDANSETRON 2 MG/ML
4 INJECTION INTRAMUSCULAR; INTRAVENOUS EVERY 6 HOURS PRN
Status: DISCONTINUED | OUTPATIENT
Start: 2021-07-23 | End: 2021-07-27 | Stop reason: HOSPADM

## 2021-07-23 RX ORDER — SODIUM CHLORIDE 9 MG/ML
25 INJECTION, SOLUTION INTRAVENOUS PRN
Status: DISCONTINUED | OUTPATIENT
Start: 2021-07-23 | End: 2021-07-27 | Stop reason: HOSPADM

## 2021-07-23 RX ORDER — ONDANSETRON 4 MG/1
4 TABLET, ORALLY DISINTEGRATING ORAL EVERY 8 HOURS PRN
Status: DISCONTINUED | OUTPATIENT
Start: 2021-07-23 | End: 2021-07-27 | Stop reason: HOSPADM

## 2021-07-23 RX ORDER — LOSARTAN POTASSIUM AND HYDROCHLOROTHIAZIDE 25; 100 MG/1; MG/1
1 TABLET ORAL DAILY
Status: DISCONTINUED | OUTPATIENT
Start: 2021-07-23 | End: 2021-07-23 | Stop reason: CLARIF

## 2021-07-23 RX ORDER — NITROGLYCERIN 0.4 MG/1
0.4 TABLET SUBLINGUAL EVERY 5 MIN PRN
Status: DISCONTINUED | OUTPATIENT
Start: 2021-07-23 | End: 2021-07-27 | Stop reason: HOSPADM

## 2021-07-23 RX ORDER — LOSARTAN POTASSIUM 100 MG/1
100 TABLET ORAL DAILY
Status: DISCONTINUED | OUTPATIENT
Start: 2021-07-23 | End: 2021-07-27 | Stop reason: HOSPADM

## 2021-07-23 RX ORDER — SIMVASTATIN 40 MG
40 TABLET ORAL NIGHTLY
COMMUNITY

## 2021-07-23 RX ORDER — OXYCODONE AND ACETAMINOPHEN 7.5; 325 MG/1; MG/1
1 TABLET ORAL EVERY 4 HOURS PRN
Status: DISCONTINUED | OUTPATIENT
Start: 2021-07-23 | End: 2021-07-27 | Stop reason: HOSPADM

## 2021-07-23 RX ORDER — SODIUM CHLORIDE 0.9 % (FLUSH) 0.9 %
5-40 SYRINGE (ML) INJECTION EVERY 12 HOURS SCHEDULED
Status: DISCONTINUED | OUTPATIENT
Start: 2021-07-23 | End: 2021-07-27 | Stop reason: HOSPADM

## 2021-07-23 RX ORDER — ACETAMINOPHEN 325 MG/1
650 TABLET ORAL EVERY 6 HOURS PRN
Status: DISCONTINUED | OUTPATIENT
Start: 2021-07-23 | End: 2021-07-27 | Stop reason: HOSPADM

## 2021-07-23 RX ORDER — POLYETHYLENE GLYCOL 3350 17 G/17G
17 POWDER, FOR SOLUTION ORAL DAILY PRN
Status: DISCONTINUED | OUTPATIENT
Start: 2021-07-23 | End: 2021-07-27 | Stop reason: HOSPADM

## 2021-07-23 RX ORDER — HYDROMORPHONE HYDROCHLORIDE 1 MG/ML
1 INJECTION, SOLUTION INTRAMUSCULAR; INTRAVENOUS; SUBCUTANEOUS
Status: DISCONTINUED | OUTPATIENT
Start: 2021-07-23 | End: 2021-07-27 | Stop reason: HOSPADM

## 2021-07-23 RX ORDER — HYDROCHLOROTHIAZIDE 25 MG/1
25 TABLET ORAL DAILY
Status: DISCONTINUED | OUTPATIENT
Start: 2021-07-23 | End: 2021-07-27 | Stop reason: HOSPADM

## 2021-07-23 RX ADMIN — OXYCODONE HYDROCHLORIDE AND ACETAMINOPHEN 1 TABLET: 7.5; 325 TABLET ORAL at 14:30

## 2021-07-23 RX ADMIN — SODIUM CHLORIDE: 9 INJECTION, SOLUTION INTRAVENOUS at 14:40

## 2021-07-23 RX ADMIN — OXYCODONE HYDROCHLORIDE AND ACETAMINOPHEN 1 TABLET: 7.5; 325 TABLET ORAL at 21:00

## 2021-07-23 RX ADMIN — ONDANSETRON HYDROCHLORIDE 4 MG: 2 SOLUTION INTRAMUSCULAR; INTRAVENOUS at 15:34

## 2021-07-23 RX ADMIN — HYDROMORPHONE HYDROCHLORIDE 1 MG: 1 INJECTION, SOLUTION INTRAMUSCULAR; INTRAVENOUS; SUBCUTANEOUS at 15:34

## 2021-07-23 ASSESSMENT — PAIN SCALES - GENERAL
PAINLEVEL_OUTOF10: 8
PAINLEVEL_OUTOF10: 4
PAINLEVEL_OUTOF10: 10

## 2021-07-24 PROCEDURE — G0378 HOSPITAL OBSERVATION PER HR: HCPCS

## 2021-07-24 PROCEDURE — 96376 TX/PRO/DX INJ SAME DRUG ADON: CPT

## 2021-07-24 PROCEDURE — 97161 PT EVAL LOW COMPLEX 20 MIN: CPT

## 2021-07-24 PROCEDURE — 6360000002 HC RX W HCPCS: Performed by: FAMILY MEDICINE

## 2021-07-24 PROCEDURE — 2580000003 HC RX 258: Performed by: FAMILY MEDICINE

## 2021-07-24 PROCEDURE — 97116 GAIT TRAINING THERAPY: CPT

## 2021-07-24 PROCEDURE — 6370000000 HC RX 637 (ALT 250 FOR IP): Performed by: ORTHOPAEDIC SURGERY

## 2021-07-24 PROCEDURE — 6370000000 HC RX 637 (ALT 250 FOR IP): Performed by: FAMILY MEDICINE

## 2021-07-24 RX ORDER — IBUPROFEN 400 MG/1
400 TABLET ORAL
Status: DISCONTINUED | OUTPATIENT
Start: 2021-07-24 | End: 2021-07-27 | Stop reason: HOSPADM

## 2021-07-24 RX ADMIN — SODIUM CHLORIDE: 9 INJECTION, SOLUTION INTRAVENOUS at 19:53

## 2021-07-24 RX ADMIN — BENZOCAINE 6 MG-MENTHOL 10 MG LOZENGES 1 LOZENGE: at 14:30

## 2021-07-24 RX ADMIN — HYDROMORPHONE HYDROCHLORIDE 1 MG: 1 INJECTION, SOLUTION INTRAMUSCULAR; INTRAVENOUS; SUBCUTANEOUS at 12:00

## 2021-07-24 RX ADMIN — HYDROMORPHONE HYDROCHLORIDE 1 MG: 1 INJECTION, SOLUTION INTRAMUSCULAR; INTRAVENOUS; SUBCUTANEOUS at 08:13

## 2021-07-24 RX ADMIN — OXYCODONE HYDROCHLORIDE AND ACETAMINOPHEN 1 TABLET: 7.5; 325 TABLET ORAL at 05:26

## 2021-07-24 RX ADMIN — IBUPROFEN 400 MG: 400 TABLET ORAL at 18:35

## 2021-07-24 RX ADMIN — HYDROMORPHONE HYDROCHLORIDE 1 MG: 1 INJECTION, SOLUTION INTRAMUSCULAR; INTRAVENOUS; SUBCUTANEOUS at 00:40

## 2021-07-24 RX ADMIN — SODIUM CHLORIDE, PRESERVATIVE FREE 10 ML: 5 INJECTION INTRAVENOUS at 08:11

## 2021-07-24 RX ADMIN — LOSARTAN POTASSIUM 100 MG: 100 TABLET, FILM COATED ORAL at 08:10

## 2021-07-24 RX ADMIN — SODIUM CHLORIDE, PRESERVATIVE FREE 10 ML: 5 INJECTION INTRAVENOUS at 21:27

## 2021-07-24 RX ADMIN — OXYCODONE HYDROCHLORIDE AND ACETAMINOPHEN 1 TABLET: 7.5; 325 TABLET ORAL at 14:30

## 2021-07-24 RX ADMIN — METFORMIN HYDROCHLORIDE 500 MG: 500 TABLET ORAL at 08:11

## 2021-07-24 RX ADMIN — ATORVASTATIN CALCIUM 10 MG: 10 TABLET, FILM COATED ORAL at 08:10

## 2021-07-24 RX ADMIN — HYDROMORPHONE HYDROCHLORIDE 1 MG: 1 INJECTION, SOLUTION INTRAMUSCULAR; INTRAVENOUS; SUBCUTANEOUS at 21:36

## 2021-07-24 RX ADMIN — IBUPROFEN 400 MG: 400 TABLET ORAL at 12:00

## 2021-07-24 RX ADMIN — HYDROCHLOROTHIAZIDE 25 MG: 25 TABLET ORAL at 08:10

## 2021-07-24 RX ADMIN — OXYCODONE HYDROCHLORIDE AND ACETAMINOPHEN 1 TABLET: 7.5; 325 TABLET ORAL at 09:16

## 2021-07-24 ASSESSMENT — PAIN DESCRIPTION - PROGRESSION
CLINICAL_PROGRESSION: NOT CHANGED
CLINICAL_PROGRESSION_2: NOT CHANGED

## 2021-07-24 ASSESSMENT — PAIN SCALES - GENERAL
PAINLEVEL_OUTOF10: 7
PAINLEVEL_OUTOF10: 9
PAINLEVEL_OUTOF10: 8
PAINLEVEL_OUTOF10: 6
PAINLEVEL_OUTOF10: 9
PAINLEVEL_OUTOF10: 8
PAINLEVEL_OUTOF10: 5
PAINLEVEL_OUTOF10: 8
PAINLEVEL_OUTOF10: 7
PAINLEVEL_OUTOF10: 9
PAINLEVEL_OUTOF10: 9
PAINLEVEL_OUTOF10: 7

## 2021-07-24 ASSESSMENT — PAIN DESCRIPTION - PAIN TYPE
TYPE: ACUTE PAIN
TYPE_2: ACUTE PAIN
TYPE: ACUTE PAIN
TYPE: ACUTE PAIN

## 2021-07-24 ASSESSMENT — PAIN - FUNCTIONAL ASSESSMENT
PAIN_FUNCTIONAL_ASSESSMENT: PREVENTS OR INTERFERES SOME ACTIVE ACTIVITIES AND ADLS

## 2021-07-24 ASSESSMENT — PAIN DESCRIPTION - ONSET
ONSET: ON-GOING
ONSET: ON-GOING
ONSET_2: ON-GOING
ONSET: ON-GOING

## 2021-07-24 ASSESSMENT — PAIN DESCRIPTION - ORIENTATION
ORIENTATION: LEFT
ORIENTATION: LEFT
ORIENTATION_2: LEFT
ORIENTATION: LEFT

## 2021-07-24 ASSESSMENT — PAIN DESCRIPTION - INTENSITY: RATING_2: 8

## 2021-07-24 ASSESSMENT — PAIN DESCRIPTION - DESCRIPTORS
DESCRIPTORS: SHARP;STABBING
DESCRIPTORS: ACHING;DISCOMFORT
DESCRIPTORS_2: DISCOMFORT;ACHING
DESCRIPTORS: ACHING;DISCOMFORT

## 2021-07-24 ASSESSMENT — PAIN DESCRIPTION - LOCATION
LOCATION: SHOULDER
LOCATION: SHOULDER
LOCATION: SHOULDER;LEG
LOCATION_2: HIP

## 2021-07-24 ASSESSMENT — PAIN DESCRIPTION - DURATION: DURATION_2: CONTINUOUS

## 2021-07-24 ASSESSMENT — PAIN DESCRIPTION - FREQUENCY
FREQUENCY: CONTINUOUS

## 2021-07-24 NOTE — CONSULTS
Orthopaedic Inpatient Consultation    NAME:  James Bridges   : 1950  MRN: [id number]    2021 10:23 AM        CHIEF COMPLAINT:  left shoulder pain low back pain neck pain left hip pain. HISTORY OF PRESENT ILLNESS:   The patient is a 79 y.o. male right-hand-dominant who presents with the above complaint after an injury at home last Wednesday. Patient was holding a ladder for his son who was over the patient's head level and his son fell knocking the ladder over and landed on top of the patient. Patient fell to the ground with his son landing on top of him. He was seen in the ER x-rays taken and discharged. The pain got too severe and he was readmitted yesterday. His worst pain is his left shoulder he points deep into the shoulder. The patient has had a C3-C5 fusion by Dr. Lilli Mcgowan in the past and is done well with that minimal neck pain. He is complaining of more neck pain now. He is also had a previous L4 S1 fusion has done well with that but is having some left-sided low back pain now. He is also having some left hamstring pain. No loss of consciousness.   Past Medical History:        Diagnosis Date    CAD (coronary artery disease)     recent stent per violeta    Cervical myelopathy ([facility] [facility] 2019    Degeneration of intervertebral disc at C4-C5 level 2019    Diabetes mellitus ([facility] [address] [facility]     Hyperlipidemia     Hypertension     Knee pain        Past Surgical History:        Procedure Laterality Date    BACK SURGERY      CARDIAC SURGERY  1999    CABG X 4VLIMA to LAD, SVG to OM, diag 1 and RCA    CERVICAL FUSION N/A 2019    C3-4 C4-5 ACDF performed by Nadeen Mccord MD at [address] [place] [address] CATH LAB PROCEDURE      KNEE SURGERY Right     scope, age 35ish    [employer] TOTAL KNEE ARTHROPLASTY Right 2020    RIGHT TOTAL KNEE REPLACEMENT performed by Pelon Hewitt MD at [address] [place] [address]       Current Medications:   Prior to Admission medications    Medication Sig Start Date End Date Taking? Authorizing Provider   simvastatin (ZOCOR) 40 MG tablet Take 40 mg by mouth nightly   Yes Historical Provider, MD   HYDROcodone-acetaminophen (NORCO) 5-325 MG per tablet Take 1 tablet by mouth every 6 hours as needed for Pain. Yes Historical Provider, MD   nitroGLYCERIN (NITROSTAT) 0.4 MG SL tablet Place 1 tablet under the tongue every 5 minutes as needed for Chest pain 20   Nadiya Ortiz MD   traMADol (ULTRAM) 50 MG tablet Take 50 mg by mouth every 6 hours as needed for Pain. Patient does not know frequency ordered. Historical Provider, MD   aspirin 81 MG EC tablet Take 1 tablet by mouth 2 times daily 20  Lilly Vivas PA-C   prasugrel (EFFIENT) 10 MG TABS Take 1 tablet by mouth daily  Patient not taking: Reported on 2021   MARIETTA Gonzalez - NP   metFORMIN (GLUCOPHAGE) 500 MG tablet HOLD METFORMIN FOR  [employer]  Patient taking differently: Take 500 mg by mouth daily (with breakfast)  20   Alfredo Swan MD   losartan-hydrochlorothiazide (HYZAAR) 100-25 MG per tablet Take 1 tablet by mouth daily    Historical Provider, MD       Allergies:  Patient has no known allergies.     Social History:   Social History     Socioeconomic History    Marital status:      Spouse name: Suzi Hinson Number of children: Not on file    Years of education: Not on file    Highest education level: Not on file   Occupational History    Not on file   Tobacco Use    Smoking status: Former Smoker     Types: Cigarettes     Quit date: 1995     Years since quittin.3    Smokeless tobacco: Never Used   Vaping Use    Vaping Use: Never used   Substance and Sexual Activity    Alcohol use: No    Drug use: No    Sexual activity: Not on file   Other Topics Concern    Not on file   Social History Narrative    Not on file     Social Determinants of Health     Financial Resource Strain:     Difficulty of Paying Living Expenses:    Food Insecurity:     Worried About [address] in the Last Year:     [address] in the Last Year:    Transportation Needs:     Lack of Transportation (Medical):  Lack of Transportation (Non-Medical):    Physical Activity:     Days of Exercise per Week:     Minutes of Exercise per Session:    Stress:     Feeling of Stress :    Social Connections:     Frequency of Communication with Friends and Family:     Frequency of Social Gatherings with Friends and Family:     Attends Sikhism Services:     Active Member of Clubs or Organizations:     Attends Club or Organization Meetings:     Marital Status:    Intimate Partner Violence:     Fear of Current or Ex-Partner:     Emotionally Abused:     Physically Abused:     Sexually Abused:        Family History:   Family History   Problem Relation Age of Onset    Heart Disease Mother     Diabetes Mother     Cancer Father         LUNG CA       REVIEW OF SYSTEMS:  14 point review of systems has been reviewed from the patient's emergency room visit, reviewed with the patient on today's date with no new changes. PHYSICAL EXAM:      Physical Examination:  Vitals:   Vitals:    07/23/21 1204 07/23/21 1703 07/23/21 2255 07/24/21 0706   BP: (!) 140/72 136/73 (!) 131/56 135/68   Pulse: 72 71 83 80   Resp: 18 18 17 14   Temp: 97 °F (36.1 °C) 95.9 °F (35.5 °C) 97.1 °F (36.2 °C) 97.3 °F (36.3 °C)   TempSrc: Temporal Temporal Temporal    SpO2: 93% 93% 94% 95%     General:  Appears stated age, no distress. Orientation:  Alert and oriented to time, place, and person. Mood and Affect:  Cooperative and pleasant. Gait:  Resting comfortably in bed. Cardiovascular:  Symmetric 1-2 plus pulses in upper and lower extremities. Lymph:  No cervical or inguinal lymphadenopathy noted. Sensation:  Grossly intact to light touch. DTR:  Normal, no pathologic reflexes.   Coordination/balance:  Normal    Musculoskeletal:    Neck is somewhat stiff with decreased range of motion. Minimal tenderness cervical spine. Minimal tenderness lumbar spine. Right upper extremity exam:  There is no tenderness to palpation about the shoulder, elbow, wrist or hand. Range of motion normal  .  5/5 strength, normal sensation, good radial pulse and skin is normal.      Left upper extremity exam:  There is  tenderness to palpation about the shoulder laterally and in the infraspinatus fossa but not the elbow, wrist or hand. Range of motion normal .   5/5 strength except for 3/5 abduction strength., normal sensation, good radial pulse and skin is normal.     Right lower extremity exam:  There is no tenderness to palpation about the hip, knee, ankle or foot. Range of motion normal .  5/5 strength, normal sensation, good dorsalis pedis pulse  and skin is normal.  Well-healed right knee incision. Left lower extremity exam:  There is minimal tenderness tenderness to palpation about the proximal hamstring but not the hip, knee, ankle or foot.  Range of motion normal .   5/5 strength normal sensation, good dorsalis pedis pulse and skin is normal.      DATA:    CBC with Differential:    Lab Results   Component Value Date    WBC 6.6 07/23/2021    RBC 4.70 07/23/2021    HGB 14.1 07/23/2021    HCT 41.1 07/23/2021     07/23/2021    MCV 87.4 07/23/2021    MCH 30.0 07/23/2021    MCHC 34.3 07/23/2021    RDW 13.4 07/23/2021    LYMPHOPCT 31.6 07/14/2020    MONOPCT 9.9 07/14/2020    BASOPCT 0.9 07/14/2020    MONOSABS 0.60 07/14/2020    LYMPHSABS 2.1 07/14/2020    EOSABS 0.40 07/14/2020    BASOSABS 0.10 07/14/2020     CMP:    Lab Results   Component Value Date     07/23/2021    K 4.3 07/23/2021     07/23/2021    CO2 25 07/23/2021    BUN 12 07/23/2021    CREATININE 0.7 07/23/2021    GFRAA >59 07/23/2021    LABGLOM >60 07/23/2021    GLUCOSE 121 07/23/2021    PROT 7.5 12/22/2020    CALCIUM 9.8 07/23/2021    BILITOT 0.4 12/22/2020    ALKPHOS 84 12/22/2020    AST 15 12/22/2020 ALT 17 12/22/2020     BMP:    Lab Results   Component Value Date     07/23/2021    K 4.3 07/23/2021     07/23/2021    CO2 25 07/23/2021    BUN 12 07/23/2021    CREATININE 0.7 07/23/2021    CALCIUM 9.8 07/23/2021    GFRAA >59 07/23/2021    LABGLOM >60 07/23/2021    GLUCOSE 121 07/23/2021         Radiology: CT Head WO Contrast    Result Date: 7/21/2021  CT BRAIN without contrast 7/21/2021 2:08 PM HISTORY: Sudden fell on him from a 12 foot ladder. COMPARISON: 4/29/2015 DLP: 655 mGy cm. All CT scans are performed using dose optimization techniques as appropriate to the performed exam and include at least one of the following: Automated exposure control, adjustment of the mA and/or kV according to size, and the use of the iterative reconstruction technique. TECHNIQUE: Serial axial tomographic images of the brain were obtained without the use of intravenous contrast. FINDINGS: The midline structures are nondisplaced. There is mild cerebral and cerebellar volume loss, with an associated increase in the prominence of the ventricles and sulci. The basilar cisterns are normal in size and configuration. There is no evidence of intracranial hemorrhage or mass-effect. There is low attenuation in the periventricular white matter, consistent with chronic ischemic change. There are no abnormal extra-axial fluid collections. There is no evidence of tonsillar herniation. There is a partially calcified lesion which is within the right frontal convex to the and is extra-axial in location. This measures 10 mm in diameter and I suspect represents a partially calcified meningioma. It is unchanged in size or appearance from the previous examination. The included orbits and their contents are unremarkable. The visualized paranasal sinuses, mastoid air cells and middle ear cavities are clear. The visualized osseous structures and overlying soft tissues of the skull and face are intact.      1. Mild cerebral and cerebellar volume described above. 3. Status post fusion at the L4-S1 level without evidence of complications. The patient is status post bilateral laminectomy at L4 and L5. . Signed by Dr Amna Melgar Additional Contrast? None    Result Date: 7/21/2021  EXAM: CT PELVIS WO CONTRAST - 7/21/2021 3:55 PM INDICATION: LEFT hip pain, fall COMPARISON: None available. DLP: 1106 mGy cm. In order to have a CT radiation dose as low as reasonably achievable, Automated Exposure Control was utilized for adjustment of the mA and/or KV according to patient size. FINDINGS: Sacrum and SI joints appear unremarkable. Pubic symphysis appears unremarkable. No acute pelvic fracture. No acute hip fracture or dislocation. There is thickening of the LEFT hamstring tendon with inflammatory stranding throughout the proximal visualized muscle body. There is also some scattered calcifications in the LEFT hamstring tendon. Findings in the lower lumbar spine are described in a separate same-day report. A few colonic diverticula are noted. No free pelvic fluid. Trace amount of LEFT gluteal soft tissue gas, correlate with history of subcutaneous injection or soft tissue injury. 1.  No acute pelvic or hip fracture. 2.  Acute LEFT hamstring tear suspected given thickened inflamed tendon and inflammation in the proximal muscle body. 3.  Trace amount of LEFT gluteal soft tissue gas, correlate with history of subcutaneous injection or soft tissue injury. Signed by Dr Nathaly Pickens    XR SHOULDER LEFT (MIN 2 VIEWS)    Result Date: 7/21/2021  EXAMINATION: XR SHOULDER LEFT (MIN 2 VIEWS) 7/21/2021 3:24 PM HISTORY: Fall, pain. Report: 3 views of the left shoulder were obtained. COMPARISON: There are no correlative imaging studies for comparison. There is mild superior subluxation of the humeral head with narrowing of the subacromial interval, moderate overgrowth of the AC joint, no fracture is identified. The glenohumeral joint is observed.  The soft tissues appear within normal limits. The visualized left ribs are intact. Moderate AC joint arthrosis, probable subacromial impingement. No acute fracture. Signed by Dr Yun Field. [place]    XR CHEST PORTABLE    Result Date: 7/21/2021  EXAM: XR CHEST PORTABLE - 7/21/2021 1:55 PM INDICATION: Fall COMPARISON: 12/22/2020 FINDINGS: Cardiac silhouette is normal in size. Prior median sternotomy. No pleural effusion or pneumothorax. No focal airspace opacity. Partially imaged cervical spine hardware. No acute osseous finding. No acute findings. Signed by Dr Jessica Merino 2-3 VW W PELVIS LEFT    Result Date: 7/21/2021  EXAM: XR HIP 2-3 VW W PELVIS LEFT - 7/21/2021 1:40 PM INDICATION: Fall, hip pain COMPARISON: None available. FINDINGS: Pubic symphysis and SI joints are intact. Sacral arcuate lines are symmetric. The ilioischial and iliopectineal lines are intact. No evidence of pubic rami fracture or iliac wing fracture. No evidence of hip fracture or dislocation. Mild bilateral hip degenerative change with acetabular osteophyte development. Lumbar spine fusion hardware is noted. No radiopaque foreign body or soft tissue gas. No acute osseous findings. Signed by Dr Elvi Grajeda:   Cervical sprain after previous cervical fusion. Probable left rotator cuff tear acute on chronic. Lumbar sprain after previous lumbar fusion. Probable left proximal hamstring strain. Plan: Physical therapy for gait training and general range of motion. Follow along with you. Pain medication. Consider left shoulder MRI on an outpatient basis if left shoulder pain does not resolve. Thanks for this consult. Provider:  Chirag Morfin MD  Date: 7/24/2021

## 2021-07-24 NOTE — PROGRESS NOTES
[facility]  Daily Progress Note  Josey Medina  MRN: [id number] LOS: 0    Admit Date: 2021 7:25 AM          Chief Complaint:  Crush injury resulting in left hamstring tear    Interval History:    Reviewed overnight events and nursing notes  Denies chest pain  No shortness of breath  Rested okay last night. Pain better controlled with current regimen      DIET:  ADULT DIET; Regular    Medications:      sodium chloride      sodium chloride 75 mL/hr at 21 1440      sodium chloride flush  5-40 mL Intravenous 2 times per day    metFORMIN  500 mg Oral Daily with breakfast    atorvastatin  10 mg Oral Daily    losartan  100 mg Oral Daily    And    hydroCHLOROthiazide  25 mg Oral Daily       Data:     Code Status: Full Code    Family History   Problem Relation Age of Onset    Heart Disease Mother     Diabetes Mother     Cancer Father         LUNG CA     Social History     Socioeconomic History    Marital status:      Spouse name: Yajaira Lloyd Number of children: Not on file    Years of education: Not on file    Highest education level: Not on file   Occupational History    Not on file   Tobacco Use    Smoking status: Former Smoker     Types: Cigarettes     Quit date: 1995     Years since quittin.3    Smokeless tobacco: Never Used   Vaping Use    Vaping Use: Never used   Substance and Sexual Activity    Alcohol use: No    Drug use: No    Sexual activity: Not on file   Other Topics Concern    Not on file   Social History Narrative    Not on file     Social Determinants of Health     Financial Resource Strain:     Difficulty of Paying Living Expenses:    Food Insecurity:     Worried About Running Out of Food in the Last Year:     Ran Out of Food in the Last Year:    Transportation Needs:     Lack of Transportation (Medical):      Lack of Transportation (Non-Medical):    Physical Activity:     Days of Exercise per Week:     Minutes of Exercise per Session: Stress:     Feeling of Stress :    Social Connections:     Frequency of Communication with Friends and Family:     Frequency of Social Gatherings with Friends and Family:     Attends Jehovah's witness Services:     Active Member of Clubs or Organizations:     Attends Club or Organization Meetings:     Marital Status:    Intimate Partner Violence:     Fear of Current or Ex-Partner:     Emotionally Abused:     Physically Abused:     Sexually Abused:        Labs:  Hematology:  Recent Labs     21  1253   WBC 6.6   HGB 14.1   HCT 41.1*        Chemistry:  Recent Labs     21  1253      K 4.3      CO2 25   GLUCOSE 121*   BUN 12   CREATININE 0.7   ANIONGAP 9   LABGLOM >60   GFRAA >59   CALCIUM 9.8     No results for input(s): PROT, LABALBU, LABA1C, Q7CWTFB, R1WUYNN, FT4, TSH, AST, ALT, LDH, GGT, ALKPHOS, BILITOT, BILIDIR, AMMONIA, AMYLASE, LIPASE, LACTATE, CHOL, HDL, LDLCHOLESTEROL, CHOLHDLRATIO, TRIG, VLDL, PHENYTOIN, PHENYF in the last 72 hours. Objective:     Vitals: /68   Pulse 80   Temp 97.3 °F (36.3 °C)   Resp 14   SpO2 95%      Intake/Output Summary (Last 24 hours) at 2021 0725  Last data filed at 2021 0527  Gross per 24 hour   Intake 1604.72 ml   Output 640 ml   Net 964.72 ml    Temp (24hrs), Av.1 °F (36.2 °C), Min:95.9 °F (35.5 °C), Max:98.3 °F (36.8 °C)    Glucose:  No results for input(s): POCGLU in the last 72 hours. Physical Examination:   GENERAL: WD/WN not in acute distress, resting well in bed  HEENT: NC/AT PERRL, EOMI grossly, TM's clear, OP negative without sig erythema or exudate, neck is supple without masses or carotid bruit noted    CV: normal S1 and S2, no sig murmur, lift or gallop, normal PMI  CHEST: clear to auscultation both anterior and posterior, no rales rhonchi or wheeze appreciated. ABD: soft NT/ND with normoactive BS noted.  No guarding or rebounding noted  /rectal: deferred  EXT: decreased ROM of bilateral lower ext with L>R, hematoma noted with good pulses noted distally  DERM: skin is warm and dry without sig rashes on exposed areas, hematoma noted  PSYCH: appears mentally stable with no obvious abnormalities  NEURO: CN 2-12 apper grossly intact without sig deficits in motor or sensory         Assessment and Plan:       Hospital Problem list:  Active Problems:    Tear of left hamstring  Atherosclerotic coronary artery disease---s/p CABG four-vessel  Chronic back and neck pain--s/p ACDF & lumbar laminectomy  Osteoarthritis--s/p TKA last year  Trauma on multiple anticoagulants--aspirin and Effient  Gait instability  Essential hypertension--Hyzaar 100/25  Diabetes mellitus type 2--continue with Metformin  Mixed hyperlipidemia--statin therapy    Treatment Plan:  admit, hold blood thinners, ice, elevate, Ortho consult, will need PT/OT eval and be able to get to and from bathroom with pain controlled on oral agents prior to discharge.     Discharge planning: Home    Reviewed treatment plans with the patient, family and nursing staff  20 minutes spent in face to face interaction and coordination of care. Electronically signed by José Antonio Bush PA-C on 7/24/2021 at 7:25 AM     Ice/compression/elevation. Hold blood thinners. Ortho to evaluate. PT/OT-will need pain control with oral meds and ability to get to and from the bathroom prior to discharge home    I have discussed the care of Bella Yuen, including pertinent history and exam findings with the ARNP/PA. I have seen and examined the patient and the key elements of all parts of the encounter have been performed by me. I agree with the assessment and plan as outlined by the ARNP/PA. Please refer to my separate note for complete documentation.      Electronically signed by Srikanth Waters MD on 7/24/2021 at 8:32 AM

## 2021-07-24 NOTE — H&P
[facility]  History and Physical    Patient:  Lisa Hoff  MRN: [id number]    CHIEF COMPLAINT:  \"Son fell off ladder on top of me\"      PCP: Leanna Millan MD    HISTORY OF PRESENT ILLNESS:   The patient is a 79 y.o. male who presents with contusion and acute hamstring tear after his son fell off the top of a ladder he was holding at the bottom. His son is a rather large man as is Mr Akbar Oliveira. Apparently presented to ER and CT scan showed an acute tear in hamstring, pt sent home with pain meds and muscle relaxers. The pain increased and he could not bear weight so contacted office and directly admitted for pain control and orthopedic consultation. No other precipitating or relieving factors noted, pain rated 8-9/10 with poor pain control. REVIEW OF SYSTEMS:    Past Medical History:      Diagnosis Date    CAD (coronary artery disease)     recent stent per violeta    Cervical myelopathy ([facility] [address] 4/11/2019    Degeneration of intervertebral disc at C4-C5 level 4/11/2019    Diabetes mellitus ([facility] [address]     Hyperlipidemia     Hypertension     Knee pain        Past Surgical History:      Procedure Laterality Date    BACK SURGERY      CARDIAC SURGERY  01/01/1999    CABG X 4VLIMA to LAD, SVG to OM, diag 1 and RCA    CERVICAL FUSION N/A 4/11/2019    C3-4 C4-5 ACDF performed by Rigo Magallanes MD at [address] [place] [address] CATH LAB PROCEDURE      KNEE SURGERY Right     scope, age 35ish    Vivian Me TOTAL KNEE ARTHROPLASTY Right 7/20/2020    RIGHT TOTAL KNEE REPLACEMENT performed by Monica Miller MD at [address] [place] [address]       Medications Prior to Admission:    Prior to Admission medications    Medication Sig Start Date End Date Taking? Authorizing Provider   simvastatin (ZOCOR) 40 MG tablet Take 40 mg by mouth nightly   Yes Historical Provider, MD   HYDROcodone-acetaminophen (NORCO) 5-325 MG per tablet Take 1 tablet by mouth every 6 hours as needed for Pain.    Yes Historical Provider, MD nitroGLYCERIN (NITROSTAT) 0.4 MG SL tablet Place 1 tablet under the tongue every 5 minutes as needed for Chest pain 20   Andi Park MD   traMADol (ULTRAM) 50 MG tablet Take 50 mg by mouth every 6 hours as needed for Pain. Patient does not know frequency ordered. Historical Provider, MD   aspirin 81 MG EC tablet Take 1 tablet by mouth 2 times daily 20  Emilie Rubio PA-C   prasugrel (EFFIENT) 10 MG TABS Take 1 tablet by mouth daily  Patient not taking: Reported on 2021   MARIETTA Bobo - NP   metFORMIN (GLUCOPHAGE) 500 MG tablet HOLD METFORMIN FOR  [employer]  Patient taking differently: Take 500 mg by mouth daily (with breakfast)  20   Naida Knott MD   losartan-hydrochlorothiazide (HYZAAR) 100-25 MG per tablet Take 1 tablet by mouth daily    Historical Provider, MD       Allergies:  Patient has no known allergies. Social History:   Social History     Socioeconomic History    Marital status:      Spouse name: Tracy Chain Number of children: Not on file    Years of education: Not on file    Highest education level: Not on file   Occupational History    Not on file   Tobacco Use    Smoking status: Former Smoker     Types: Cigarettes     Quit date: 1995     Years since quittin.3    Smokeless tobacco: Never Used   Vaping Use    Vaping Use: Never used   Substance and Sexual Activity    Alcohol use: No    Drug use: No    Sexual activity: Not on file   Other Topics Concern    Not on file   Social History Narrative    Not on file     Social Determinants of Health     Financial Resource Strain:     Difficulty of Paying Living Expenses:    Food Insecurity:     Worried About Running Out of Food in the Last Year:     [address] in the Last Year:    Transportation Needs:     Lack of Transportation (Medical):      Lack of Transportation (Non-Medical):    Physical Activity:     Days of Exercise per

## 2021-07-24 NOTE — PROGRESS NOTES
Physical Therapy    Facility/Department: [facility] SURG SERVICES  Initial Assessment    NAME: Jaron Wiseman  : 1950  MRN: [id number]    Date of Service: 2021    Discharge Recommendations:  Continue to assess pending progress (pt plans to d/c home when able; pt would benefit from [facility] services if unable to ambulate w/ normal gait pattern or lack of LLE ROM due to pain upon d/c; will continue to follow until d/c)        Assessment   Body structures, Functions, Activity limitations: Decreased functional mobility ; Decreased ADL status; Decreased strength;Decreased endurance; Increased pain;Decreased balance  Assessment: pt would benefit from skilled physical therapy in this setting to address functional mobility and strength/endurance deficits  Prognosis: Good  Decision Making: Low Complexity  PT Education: General Safety;Gait Training;PT Role;Functional Mobility Training; Injury Prevention;Transfer Training  REQUIRES PT FOLLOW UP: Yes  Activity Tolerance  Activity Tolerance: Patient Tolerated treatment well;Patient limited by pain       Patient Diagnosis(es): There were no encounter diagnoses. has a past medical history of CAD (coronary artery disease), Cervical myelopathy (HCC), Degeneration of intervertebral disc at C4-C5 level, Diabetes mellitus ([address] Hyperlipidemia, Hypertension, and Knee pain. has a past surgical history that includes back surgery; knee surgery (Right); Cholecystectomy; cervical fusion (N/A, 2019); Diagnostic Cardiac Cath Lab Procedure; Cardiac surgery (1999); and Total knee arthroplasty (Right, 2020).     Restrictions  Restrictions/Precautions  Restrictions/Precautions: Fall Risk  Vision/Hearing        Subjective  General  Family / Caregiver Present: No  Diagnosis: L hamstring tear, L shoulder pain  Follows Commands: Within Functional Limits  Subjective  Subjective: pt willing to work with physical therapy  Pain Screening  Patient Currently in Pain: Yes  Pain Assessment  Pain Assessment: 0-10  Pain Level: 9  Patient's Stated Pain Goal: No pain  Pain Type: Acute pain  Pain Location: Shoulder  Pain Orientation: Left  Pain Descriptors: Aching;Discomfort  Pain Frequency: Continuous  Pain Onset: On-going  Clinical Progression: Not changed  Functional Pain Assessment: Prevents or interferes some active activities and ADLs  Non-Pharmaceutical Pain Intervention(s): Repositioned;Cold applied; Ambulation/Increased Activity  Response to Pain Intervention: Patient Satisfied  Vital Signs  Patient Currently in Pain: Yes  Pre Treatment Pain Screening  Intervention List: Patient able to continue with treatment    Orientation     Social/Functional History  Social/Functional History  Lives With: Spouse  Type of Home: House  Home Layout: One level  Home Access: Stairs to enter with rails  Entrance Stairs - Number of Steps: 5  Entrance Stairs - Rails: Both  Bathroom Shower/Tub: Tub/Shower unit, Walk-in shower  Bathroom Toilet: Handicap height  Home Equipment: Rolling walker, Cane  ADL Assistance: Independent  Ambulation Assistance: Independent  Transfer Assistance: Independent  Active : Yes  Cognition        Objective     Observation/Palpation  Observation: Very large dark purple bruise on back of LLE    AROM RLE (degrees)  RLE AROM: WFL  AROM LLE (degrees)  LLE AROM : WFL  Strength RLE  Strength RLE: WFL  Strength LLE  Strength LLE: WFL        Bed mobility  Supine to Sit: Stand by assistance  Sit to Supine: Stand by assistance  Transfers  Sit to Stand: Contact guard assistance  Stand to sit: Contact guard assistance  Ambulation  Ambulation?: Yes  WB Status: fwb  Ambulation 1  Surface: level tile  Device: Rolling Walker  Assistance: Contact guard assistance  Gait Deviations: Slow Mallika; Increased ARETHA; Decreased step length;Decreased step height  Distance: 10 ft  Comments: Pt demonstrates antalgic gait pattern due to pain in LLE and LUE     Balance  Sitting - Static: Good        Plan Plan  Times per week: 3-5  Plan weeks: 2  Current Treatment Recommendations: Strengthening, ROM, Balance Training, Functional Mobility Training, Transfer Training, Pain Management, Positioning, Gait Training, Endurance Training, Safety Education & Training  Safety Devices  Type of devices: Bed alarm in place, Call light within reach, Patient at risk for falls, Left in bed    Goals  Short term goals  Time Frame for Short term goals: 2 weeks  Short term goal 1: Sit<>Stand w/ SBA  Short term goal 2: Ambulate 75 ft w/ rw w/ CGA       Therapy Time   Individual Concurrent Group Co-treatment   Time In           Time Out           Minutes                   [employer]     Electronically signed by [employer], Student PT on 7/24/2021 at 3:42 PM

## 2021-07-25 PROCEDURE — 6370000000 HC RX 637 (ALT 250 FOR IP): Performed by: ORTHOPAEDIC SURGERY

## 2021-07-25 PROCEDURE — 2580000003 HC RX 258: Performed by: FAMILY MEDICINE

## 2021-07-25 PROCEDURE — 97116 GAIT TRAINING THERAPY: CPT

## 2021-07-25 PROCEDURE — 6370000000 HC RX 637 (ALT 250 FOR IP): Performed by: PHYSICIAN ASSISTANT

## 2021-07-25 PROCEDURE — G0378 HOSPITAL OBSERVATION PER HR: HCPCS

## 2021-07-25 PROCEDURE — 6360000002 HC RX W HCPCS: Performed by: FAMILY MEDICINE

## 2021-07-25 PROCEDURE — 96376 TX/PRO/DX INJ SAME DRUG ADON: CPT

## 2021-07-25 PROCEDURE — 6370000000 HC RX 637 (ALT 250 FOR IP): Performed by: FAMILY MEDICINE

## 2021-07-25 RX ORDER — GABAPENTIN 300 MG/1
300 CAPSULE ORAL 4 TIMES DAILY
Status: DISCONTINUED | OUTPATIENT
Start: 2021-07-25 | End: 2021-07-27 | Stop reason: HOSPADM

## 2021-07-25 RX ADMIN — IBUPROFEN 400 MG: 400 TABLET ORAL at 11:28

## 2021-07-25 RX ADMIN — ATORVASTATIN CALCIUM 10 MG: 10 TABLET, FILM COATED ORAL at 08:14

## 2021-07-25 RX ADMIN — OXYCODONE HYDROCHLORIDE AND ACETAMINOPHEN 1 TABLET: 7.5; 325 TABLET ORAL at 11:28

## 2021-07-25 RX ADMIN — SODIUM CHLORIDE, PRESERVATIVE FREE 10 ML: 5 INJECTION INTRAVENOUS at 21:50

## 2021-07-25 RX ADMIN — OXYCODONE HYDROCHLORIDE AND ACETAMINOPHEN 1 TABLET: 7.5; 325 TABLET ORAL at 06:32

## 2021-07-25 RX ADMIN — OXYCODONE HYDROCHLORIDE AND ACETAMINOPHEN 1 TABLET: 7.5; 325 TABLET ORAL at 15:11

## 2021-07-25 RX ADMIN — HYDROMORPHONE HYDROCHLORIDE 1 MG: 1 INJECTION, SOLUTION INTRAMUSCULAR; INTRAVENOUS; SUBCUTANEOUS at 04:36

## 2021-07-25 RX ADMIN — OXYCODONE HYDROCHLORIDE AND ACETAMINOPHEN 1 TABLET: 7.5; 325 TABLET ORAL at 19:36

## 2021-07-25 RX ADMIN — LOSARTAN POTASSIUM 100 MG: 100 TABLET, FILM COATED ORAL at 08:14

## 2021-07-25 RX ADMIN — SODIUM CHLORIDE: 9 INJECTION, SOLUTION INTRAVENOUS at 08:12

## 2021-07-25 RX ADMIN — GABAPENTIN 300 MG: 300 CAPSULE ORAL at 21:50

## 2021-07-25 RX ADMIN — METFORMIN HYDROCHLORIDE 500 MG: 500 TABLET ORAL at 08:14

## 2021-07-25 RX ADMIN — HYDROMORPHONE HYDROCHLORIDE 1 MG: 1 INJECTION, SOLUTION INTRAMUSCULAR; INTRAVENOUS; SUBCUTANEOUS at 00:35

## 2021-07-25 RX ADMIN — IBUPROFEN 400 MG: 400 TABLET ORAL at 17:21

## 2021-07-25 RX ADMIN — SODIUM CHLORIDE, PRESERVATIVE FREE 10 ML: 5 INJECTION INTRAVENOUS at 08:14

## 2021-07-25 RX ADMIN — SODIUM CHLORIDE: 9 INJECTION, SOLUTION INTRAVENOUS at 21:50

## 2021-07-25 RX ADMIN — IBUPROFEN 400 MG: 400 TABLET ORAL at 08:13

## 2021-07-25 RX ADMIN — HYDROCHLOROTHIAZIDE 25 MG: 25 TABLET ORAL at 08:14

## 2021-07-25 ASSESSMENT — PAIN SCALES - GENERAL
PAINLEVEL_OUTOF10: 10
PAINLEVEL_OUTOF10: 8
PAINLEVEL_OUTOF10: 8
PAINLEVEL_OUTOF10: 10
PAINLEVEL_OUTOF10: 10
PAINLEVEL_OUTOF10: 8
PAINLEVEL_OUTOF10: 10
PAINLEVEL_OUTOF10: 7
PAINLEVEL_OUTOF10: 10
PAINLEVEL_OUTOF10: 7

## 2021-07-25 ASSESSMENT — PAIN DESCRIPTION - DESCRIPTORS
DESCRIPTORS: SHARP;STABBING
DESCRIPTORS: BURNING

## 2021-07-25 ASSESSMENT — PAIN DESCRIPTION - PAIN TYPE
TYPE: ACUTE PAIN
TYPE: ACUTE PAIN

## 2021-07-25 ASSESSMENT — PAIN DESCRIPTION - ONSET
ONSET: ON-GOING
ONSET: ON-GOING

## 2021-07-25 ASSESSMENT — PAIN - FUNCTIONAL ASSESSMENT
PAIN_FUNCTIONAL_ASSESSMENT: PREVENTS OR INTERFERES SOME ACTIVE ACTIVITIES AND ADLS
PAIN_FUNCTIONAL_ASSESSMENT: ACTIVITIES ARE NOT PREVENTED

## 2021-07-25 ASSESSMENT — PAIN DESCRIPTION - ORIENTATION
ORIENTATION: LEFT
ORIENTATION: LEFT

## 2021-07-25 ASSESSMENT — PAIN DESCRIPTION - FREQUENCY
FREQUENCY: CONTINUOUS
FREQUENCY: CONTINUOUS

## 2021-07-25 ASSESSMENT — PAIN DESCRIPTION - LOCATION
LOCATION: SHOULDER
LOCATION: SHOULDER

## 2021-07-25 ASSESSMENT — PAIN DESCRIPTION - PROGRESSION
CLINICAL_PROGRESSION: NOT CHANGED
CLINICAL_PROGRESSION: RAPIDLY WORSENING

## 2021-07-25 NOTE — PROGRESS NOTES
[facility]  Daily Progress Note  Rachel Osborne  MRN: [id number] LOS: 0    Admit Date: 2021 7:25 AM          Chief Complaint:  Crush injury resulting in left hamstring tear    Interval History:    Reviewed overnight events and nursing notes  Denies chest pain  No shortness of breath  Rested okay last night. Pain moderately controlled. DIET:  ADULT DIET; Regular    Medications:      sodium chloride      sodium chloride 75 mL/hr at 21      ibuprofen  400 mg Oral TID WC    sodium chloride flush  5-40 mL Intravenous 2 times per day    metFORMIN  500 mg Oral Daily with breakfast    atorvastatin  10 mg Oral Daily    losartan  100 mg Oral Daily    And    hydroCHLOROthiazide  25 mg Oral Daily       Data:     Code Status: Full Code    Family History   Problem Relation Age of Onset    Heart Disease Mother     Diabetes Mother     Cancer Father         LUNG CA     Social History     Socioeconomic History    Marital status:      Spouse name: Aurora Louie Number of children: Not on file    Years of education: Not on file    Highest education level: Not on file   Occupational History    Not on file   Tobacco Use    Smoking status: Former Smoker     Types: Cigarettes     Quit date: 1995     Years since quittin.3    Smokeless tobacco: Never Used   Vaping Use    Vaping Use: Never used   Substance and Sexual Activity    Alcohol use: No    Drug use: No    Sexual activity: Not on file   Other Topics Concern    Not on file   Social History Narrative    Not on file     Social Determinants of Health     Financial Resource Strain:     Difficulty of Paying Living Expenses:    Food Insecurity:     Worried About Running Out of Food in the Last Year:     Ran Out of Food in the Last Year:    Transportation Needs:     Lack of Transportation (Medical):      Lack of Transportation (Non-Medical):    Physical Activity:     Days of Exercise per Week:     Minutes of Exercise per Session:    Stress:     Feeling of Stress :    Social Connections:     Frequency of Communication with Friends and Family:     Frequency of Social Gatherings with Friends and Family:     Attends Hinduism Services:     Active Member of Clubs or Organizations:     Attends Club or Organization Meetings:     Marital Status:    Intimate Partner Violence:     Fear of Current or Ex-Partner:     Emotionally Abused:     Physically Abused:     Sexually Abused:        Labs:  Hematology:  Recent Labs     21  1253   WBC 6.6   HGB 14.1   HCT 41.1*        Chemistry:  Recent Labs     21  1253      K 4.3      CO2 25   GLUCOSE 121*   BUN 12   CREATININE 0.7   ANIONGAP 9   LABGLOM >60   GFRAA >59   CALCIUM 9.8     No results for input(s): PROT, LABALBU, LABA1C, H1CSDVE, R9GVAWH, FT4, TSH, AST, ALT, LDH, GGT, ALKPHOS, BILITOT, BILIDIR, AMMONIA, AMYLASE, LIPASE, LACTATE, CHOL, HDL, LDLCHOLESTEROL, CHOLHDLRATIO, TRIG, VLDL, PHENYTOIN, PHENYF in the last 72 hours. Objective:     Vitals: BP (!) 145/70   Pulse 78   Temp 97.5 °F (36.4 °C)   Resp 18   SpO2 95%      Intake/Output Summary (Last 24 hours) at 2021 0725  Last data filed at 2021 0443  Gross per 24 hour   Intake 2247 ml   Output 1900 ml   Net 347 ml    Temp (24hrs), Av.8 °F (36 °C), Min:96 °F (35.6 °C), Max:97.5 °F (36.4 °C)    Glucose:  No results for input(s): POCGLU in the last 72 hours. Physical Examination:   GENERAL: WD/WN not in acute distress, resting well in bed  HEENT: NC/AT PERRL, EOMI grossly, TM's clear, OP negative without sig erythema or exudate, neck is supple without masses or carotid bruit noted    CV: normal S1 and S2, no sig murmur, lift or gallop, normal PMI  CHEST: clear to auscultation both anterior and posterior, no rales rhonchi or wheeze appreciated. ABD: soft NT/ND with normoactive BS noted.  No guarding or rebounding noted  /rectal: deferred  EXT: decreased ROM of bilateral lower ext with L>R, hematoma noted with good pulses noted distally  DERM: skin is warm and dry without sig rashes on exposed areas, hematoma noted  PSYCH: appears mentally stable with no obvious abnormalities  NEURO: CN 2-12 apper grossly intact without sig deficits in motor or sensory         Assessment and Plan:       Hospital Problem list:  Active Problems:    Tear of left hamstring  Atherosclerotic coronary artery disease---s/p CABG four-vessel  Osteoarthritis--s/p TKA last year  Trauma on multiple anticoagulants--aspirin and Effient  Gait instability  Essential hypertension--Hyzaar 100/25. BP slightly up but stable. Will hold on adjustment of medications as there is a concern for potential orthostasis and hypotension with pain medication. Diabetes mellitus type 2--continue with Metformin  Mixed hyperlipidemia--statin therapy  Chronic back and neck pain--s/p ACDF & lumbar laminectomy      Treatment Plan:  Pain control  Ice for anti-inflammatory effect  Muscle relaxer  IV and p.o. pain medication. Will monitor closely. Judicious use of opioids. Patient with chronic pain syndrome neck and back. Ortho consult reviewed and appreciated  Hold blood thinners  Continue maximize efforts with physical therapy  Patient states he is not ready for discharge secondary to immobility and pain. Hopefully home 1 to 2 days as he is clinically stable.     Discharge planning: Home    Reviewed treatment plans with the patient, family and nursing staff  30 minutes spent in face to face interaction and coordination of care. Electronically signed by Jaz Gaitan PA-C on 7/25/2021 at 7:25 AM       Still requiring IV pain control. Work with PT/OT. Discharge home 1-2 days. I have discussed the care of Sherlyn Sarkar, including pertinent history and exam findings with the ARNP/PA. I have seen and examined the patient and the key elements of all parts of the encounter have been performed by me.  I agree with the assessment and plan as outlined by the ARNP/PA. Please refer to my separate note for complete documentation.      Electronically signed by Lazaor Layne MD on 7/25/2021 at 9:30 AM

## 2021-07-25 NOTE — PROGRESS NOTES
07/25/21 1207   Restrictions/Precautions   Restrictions/Precautions Fall Risk   General   Chart Reviewed Yes   Family / Caregiver Present No   Subjective   Subjective I don't think I will make it to the [facility]   Pain Screening   Patient Currently in Pain Yes   Intervention List Patient able to continue with treatment   Pain Assessment   Pain Assessment 0-10   Pain Level 10   Patient's Stated Pain Goal No pain   Pain Type Acute pain   Pain Location Shoulder   Pain Orientation Left   Pain Descriptors Sharp; Stabbing   Pain Frequency Continuous   Pain Onset On-going   Clinical Progression Not changed   Functional Pain Assessment Prevents or interferes some active activities and ADLs   Non-Pharmaceutical Pain Intervention(s) Cold applied   Response to Pain Intervention Patient Satisfied   Oxygen Therapy   O2 Device None (Room air)   Bed Mobility   Supine to Sit Stand by assistance   Sit to Supine Stand by assistance   Transfers   Sit to Stand Contact guard assistance   Stand to sit Contact guard assistance   Ambulation   Ambulation? Yes   WB Status fwb   Ambulation 1   Device Rolling Walker   Assistance Contact guard assistance   Quality of Gait Slow no LOB   Gait Deviations Slow Mallika; Increased ARETHA; Decreased step length;Decreased step height   Distance 12'   Comments Patient in bed post gait   Activity Tolerance   Activity Tolerance Patient limited by pain   Safety Devices   Type of devices Left in bed;Call light within reach; Bed alarm in place   Physical Therapy    Electronically signed by Sola Tavarez PTA on 7/25/2021 at 12:13 PM

## 2021-07-25 NOTE — PROGRESS NOTES
Subjective:     Hospital  Day: 2 No complaints shoulder still pretty sore. Walked to the door yesterday. Objective:     Patient Vitals for the past 24 hrs:   BP Temp Temp src Pulse Resp SpO2   07/25/21 0657 (!) 145/70 97.5 °F (36.4 °C) -- 78 18 95 %   07/25/21 0512 (!) 142/60 96 °F (35.6 °C) Temporal 79 16 96 %   07/24/21 2140 (!) 150/68 96.3 °F (35.7 °C) Temporal 81 18 94 %   07/24/21 1920 -- -- -- 70 -- --   07/24/21 1839 134/65 96.5 °F (35.8 °C) Temporal 71 16 94 %   07/24/21 1636 118/60 97.1 °F (36.2 °C) Temporal 71 16 93 %   07/24/21 1002 113/69 97.3 °F (36.3 °C) Temporal 76 15 93 %       General: alert, appears stated age and cooperative   Wound: na    Neurovascular: Exam normal    Left thigh shows posterior bruising and tenderness around the proximal hamstrings. Left shoulder full active range of motion without much pain. Negative impingement. Good abduction strength 4/5         Data Review:  Recent Labs     07/23/21  1253   HGB 14.1     Recent Labs     07/23/21  1253      K 4.3   CREATININE 0.7   GLUCOSE 121*     No results for input(s): POCGLU in the last 72 hours. No orders to display         Assessment:     Left hamstring strain. Left shoulder strain. Low back sprain.  Neck sprain    Plan:     Pain control  PT/OT  Ice and elevate  Discharge Home soon

## 2021-07-26 PROCEDURE — 6370000000 HC RX 637 (ALT 250 FOR IP): Performed by: FAMILY MEDICINE

## 2021-07-26 PROCEDURE — 6370000000 HC RX 637 (ALT 250 FOR IP): Performed by: PHYSICIAN ASSISTANT

## 2021-07-26 PROCEDURE — 6360000002 HC RX W HCPCS: Performed by: FAMILY MEDICINE

## 2021-07-26 PROCEDURE — 1210000000 HC MED SURG R&B

## 2021-07-26 PROCEDURE — 2500000003 HC RX 250 WO HCPCS: Performed by: FAMILY MEDICINE

## 2021-07-26 PROCEDURE — 6370000000 HC RX 637 (ALT 250 FOR IP): Performed by: ORTHOPAEDIC SURGERY

## 2021-07-26 PROCEDURE — 96372 THER/PROPH/DIAG INJ SC/IM: CPT

## 2021-07-26 PROCEDURE — 2580000003 HC RX 258: Performed by: FAMILY MEDICINE

## 2021-07-26 PROCEDURE — 3E0U33Z INTRODUCTION OF ANTI-INFLAMMATORY INTO JOINTS, PERCUTANEOUS APPROACH: ICD-10-PCS | Performed by: ORTHOPAEDIC SURGERY

## 2021-07-26 RX ORDER — LIDOCAINE HYDROCHLORIDE 10 MG/ML
5 INJECTION, SOLUTION EPIDURAL; INFILTRATION; INTRACAUDAL; PERINEURAL ONCE
Status: COMPLETED | OUTPATIENT
Start: 2021-07-26 | End: 2021-07-26

## 2021-07-26 RX ORDER — METHYLPREDNISOLONE ACETATE 80 MG/ML
80 INJECTION, SUSPENSION INTRA-ARTICULAR; INTRALESIONAL; INTRAMUSCULAR; SOFT TISSUE ONCE
Status: DISCONTINUED | OUTPATIENT
Start: 2021-07-26 | End: 2021-07-27 | Stop reason: HOSPADM

## 2021-07-26 RX ORDER — AMLODIPINE BESYLATE 5 MG/1
5 TABLET ORAL DAILY
Status: DISCONTINUED | OUTPATIENT
Start: 2021-07-26 | End: 2021-07-27 | Stop reason: HOSPADM

## 2021-07-26 RX ORDER — BETAMETHASONE SODIUM PHOSPHATE AND BETAMETHASONE ACETATE 3; 3 MG/ML; MG/ML
12 INJECTION, SUSPENSION INTRA-ARTICULAR; INTRALESIONAL; INTRAMUSCULAR; SOFT TISSUE ONCE
Status: COMPLETED | OUTPATIENT
Start: 2021-07-26 | End: 2021-07-26

## 2021-07-26 RX ADMIN — GABAPENTIN 300 MG: 300 CAPSULE ORAL at 12:43

## 2021-07-26 RX ADMIN — IBUPROFEN 400 MG: 400 TABLET ORAL at 18:13

## 2021-07-26 RX ADMIN — AMLODIPINE BESYLATE 5 MG: 5 TABLET ORAL at 08:39

## 2021-07-26 RX ADMIN — LIDOCAINE HYDROCHLORIDE 3 ML: 10 INJECTION, SOLUTION EPIDURAL; INFILTRATION; INTRACAUDAL; PERINEURAL at 07:56

## 2021-07-26 RX ADMIN — GABAPENTIN 300 MG: 300 CAPSULE ORAL at 18:13

## 2021-07-26 RX ADMIN — GABAPENTIN 300 MG: 300 CAPSULE ORAL at 08:39

## 2021-07-26 RX ADMIN — POLYETHYLENE GLYCOL 3350 17 G: 17 POWDER, FOR SOLUTION ORAL at 18:17

## 2021-07-26 RX ADMIN — HYDROCHLOROTHIAZIDE 25 MG: 25 TABLET ORAL at 08:39

## 2021-07-26 RX ADMIN — GABAPENTIN 300 MG: 300 CAPSULE ORAL at 21:42

## 2021-07-26 RX ADMIN — OXYCODONE HYDROCHLORIDE AND ACETAMINOPHEN 1 TABLET: 7.5; 325 TABLET ORAL at 11:05

## 2021-07-26 RX ADMIN — LOSARTAN POTASSIUM 100 MG: 100 TABLET, FILM COATED ORAL at 08:39

## 2021-07-26 RX ADMIN — OXYCODONE HYDROCHLORIDE AND ACETAMINOPHEN 1 TABLET: 7.5; 325 TABLET ORAL at 00:27

## 2021-07-26 RX ADMIN — Medication 12 MG: at 07:58

## 2021-07-26 RX ADMIN — ATORVASTATIN CALCIUM 10 MG: 10 TABLET, FILM COATED ORAL at 08:39

## 2021-07-26 RX ADMIN — OXYCODONE HYDROCHLORIDE AND ACETAMINOPHEN 1 TABLET: 7.5; 325 TABLET ORAL at 18:17

## 2021-07-26 RX ADMIN — OXYCODONE HYDROCHLORIDE AND ACETAMINOPHEN 1 TABLET: 7.5; 325 TABLET ORAL at 06:44

## 2021-07-26 RX ADMIN — IBUPROFEN 400 MG: 400 TABLET ORAL at 08:39

## 2021-07-26 RX ADMIN — METFORMIN HYDROCHLORIDE 500 MG: 500 TABLET ORAL at 08:39

## 2021-07-26 RX ADMIN — SODIUM CHLORIDE, PRESERVATIVE FREE 10 ML: 5 INJECTION INTRAVENOUS at 21:42

## 2021-07-26 RX ADMIN — IBUPROFEN 400 MG: 400 TABLET ORAL at 12:43

## 2021-07-26 ASSESSMENT — PAIN SCALES - GENERAL
PAINLEVEL_OUTOF10: 7
PAINLEVEL_OUTOF10: 6
PAINLEVEL_OUTOF10: 6
PAINLEVEL_OUTOF10: 7
PAINLEVEL_OUTOF10: 6
PAINLEVEL_OUTOF10: 4
PAINLEVEL_OUTOF10: 5

## 2021-07-26 NOTE — CARE COORDINATION
Spoke with patient regarding MD orders for [facility] services. Patient agreeable and has chosen [facility]. Referral Faxed. [facility] [phone]. [phone]. Please notify [facility] when patient discharges and fax DC Summary,  DC med list and any new [facility] orders. The Patient and/or patient representative was provided with a choice of provider and agrees   with the discharge plan. [x] Yes [] No    Freedom of choice list was provided with basic dialogue that supports the patient's individualized plan of care/goals, treatment preferences and shares the quality data associated with the providers.  [x] Yes [] No  Electronically signed by Sarina Gilbert RN on 7/26/2021 at 11:29 AM

## 2021-07-26 NOTE — PROGRESS NOTES
07/26/21 0521   General Comment   Comments Patient declined due to pain in L hand   Physical Therapy    Electronically signed by Golden Traylor PTA on 7/26/2021 at 4:56 PM

## 2021-07-26 NOTE — PROGRESS NOTES
[facility]  Daily Progress Note  Katie Holly  MRN: [id number] LOS: 0    Admit Date: 2021 7:32 AM          Chief Complaint:  Crush injury resulting in left hamstring tear    Interval History:    Reviewed overnight events and nursing notes  Denies chest pain  No shortness of breath  Rested okay last night. Poor pain control      DIET:  ADULT DIET; Regular    Medications:      sodium chloride      sodium chloride 75 mL/hr at 21 2150      gabapentin  300 mg Oral 4x Daily    ibuprofen  400 mg Oral TID WC    sodium chloride flush  5-40 mL Intravenous 2 times per day    metFORMIN  500 mg Oral Daily with breakfast    atorvastatin  10 mg Oral Daily    losartan  100 mg Oral Daily    And    hydroCHLOROthiazide  25 mg Oral Daily       Data:     Code Status: Full Code    Family History   Problem Relation Age of Onset    Heart Disease Mother     Diabetes Mother     Cancer Father         LUNG CA     Social History     Socioeconomic History    Marital status:      Spouse name: Alexandrea Roper Number of children: Not on file    Years of education: Not on file    Highest education level: Not on file   Occupational History    Not on file   Tobacco Use    Smoking status: Former Smoker     Types: Cigarettes     Quit date: 1995     Years since quittin.3    Smokeless tobacco: Never Used   Vaping Use    Vaping Use: Never used   Substance and Sexual Activity    Alcohol use: No    Drug use: No    Sexual activity: Not on file   Other Topics Concern    Not on file   Social History Narrative    Not on file     Social Determinants of Health     Financial Resource Strain:     Difficulty of Paying Living Expenses:    Food Insecurity:     Worried About Running Out of Food in the Last Year:     Ran Out of Food in the Last Year:    Transportation Needs:     Lack of Transportation (Medical):      Lack of Transportation (Non-Medical):    Physical Activity:     Days of Exercise per Week:     Minutes of Exercise per Session:    Stress:     Feeling of Stress :    Social Connections:     Frequency of Communication with Friends and Family:     Frequency of Social Gatherings with Friends and Family:     Attends Hoah[employer]ism Services:     Active Member of Clubs or Organizations:     Attends Club or Organization Meetings:     Marital Status:    Intimate Partner Violence:     Fear of Current or Ex-Partner:     Emotionally Abused:     Physically Abused:     Sexually Abused:        Labs:  Hematology:  Recent Labs     21  1253   WBC 6.6   HGB 14.1   HCT 41.1*        Chemistry:  Recent Labs     21  1253      K 4.3      CO2 25   GLUCOSE 121*   BUN 12   CREATININE 0.7   ANIONGAP 9   LABGLOM >60   GFRAA >59   CALCIUM 9.8     No results for input(s): PROT, LABALBU, LABA1C, G5IJIQW, Y5EFPIQ, FT4, TSH, AST, ALT, LDH, GGT, ALKPHOS, BILITOT, BILIDIR, AMMONIA, AMYLASE, LIPASE, LACTATE, CHOL, HDL, LDLCHOLESTEROL, CHOLHDLRATIO, TRIG, VLDL, PHENYTOIN, PHENYF in the last 72 hours. Objective:     Vitals: BP (!) 161/92   Pulse 89   Temp 96.7 °F (35.9 °C) (Temporal)   Resp 18   Ht 6' 1\" (1.854 m)   Wt 250 lb (113.4 kg)   SpO2 94%   BMI 32.98 kg/m²      Intake/Output Summary (Last 24 hours) at 2021 0732  Last data filed at 2021 0646  Gross per 24 hour   Intake 2471.5 ml   Output 1775 ml   Net 696.5 ml    Temp (24hrs), Av.4 °F (36.3 °C), Min:96.7 °F (35.9 °C), Max:97.9 °F (36.6 °C)    Glucose:  No results for input(s): POCGLU in the last 72 hours. Physical Examination:   GENERAL: WD/WN not in acute distress, resting well in bed  HEENT: NC/AT PERRL, EOMI grossly, TM's clear, OP negative without sig erythema or exudate, neck is supple without masses or carotid bruit noted    CV: normal S1 and S2, no sig murmur, lift or gallop, normal PMI  CHEST: clear to auscultation both anterior and posterior, no rales rhonchi or wheeze appreciated.   ABD: soft NT/ND with normoactive BS noted. No guarding or rebounding noted  /rectal: deferred  EXT: decreased ROM of bilateral lower ext with L>R, hematoma noted with good pulses noted distally  DERM: skin is warm and dry without sig rashes on exposed areas, hematoma noted  PSYCH: appears mentally stable with no obvious abnormalities  NEURO: CN 2-12 apper grossly intact without sig deficits in motor or sensory         Assessment and Plan:       Hospital Problem list:  Active Problems:    Tear of left hamstring  Atherosclerotic coronary artery disease---s/p CABG four-vessel  Osteoarthritis--s/p TKA last year  Trauma on multiple anticoagulants--aspirin and Effient  Gait instability  Essential hypertension--Hyzaar 100/25. BP slightly up but stable. Add Norvasc for better blood pressure control. Diabetes mellitus type 2--continue with Metformin  Mixed hyperlipidemia--statin therapy  Chronic back and neck pain--s/p ACDF & lumbar laminectomy      Treatment Plan:  Pain control  Ice for anti-inflammatory effect  Muscle relaxer  IV and p.o. pain medication. Will monitor closely. Judicious use of opioids. Patient with chronic pain syndrome neck and back. Ortho consult reviewed and appreciated  Hold blood thinners  Continue maximize efforts with physical therapy  Patient states he is not ready for discharge secondary to immobility and pain. Hopefully home soon     Discharge planning: Home    Reviewed treatment plans with the patient, family and nursing staff  30 minutes spent in face to face interaction and coordination of care. Electronically signed by Valerie Olivo PA-C on 7/26/2021 at 7:32 AM     Left shoulder pain-status post cortisone injection today by orthopedics. Hamstring tear-ice anti-inflammatories and muscle relaxers. PT/OT to evaluate today. Discharge home in a.m. Will get  to arrange home PT/OT home health services.     I have discussed the care of Hernan Stuart, including pertinent history and exam findings with the ARNP/PA. I have seen and examined the patient and the key elements of all parts of the encounter have been performed by me. I agree with the assessment and plan as outlined by the ARNP/PA. Please refer to my separate note for complete documentation.      Electronically signed by Tamar Rivero MD on 7/26/2021 at 8:00 AM

## 2021-07-26 NOTE — PROCEDURES
INJECTION OPERATIVE NOTE    NAME OF SURGEON / : Ronal Branch MD  PATIENT:   Ashwini Corrales  Date: 7/26/2021        Time: 7:46 AM   Referring Physician: ________________________    PREOP DIAGNOSIS:   left shoulder  Rotator cuff tendonitis   POSTOP DIAGNOSIS:  Same     PROCEDURE:  left shoulder  Cortisone injection    FINDINGS: None  ANESTHESIA: local  COMPLICATIONS:  None          INDICATIONS:  Patient consents to the above procedure. Patient understands the risks of bleeding, infection, nerve injury, stiffness, and blood clots. Procedure in Detail:     The  area was prepped with chlorhexidine and alcohol . Ten milliliters of 1% lidocaine was injected using a 21 gauge spinal needle into the skin and then into through capsule. The subacromial space   was then injected with 12 mg of betamethasone and chased with 2 cc of lidocaine. The needle was withdrawn and a bandaid applied. Patient tolerated well. .       Electronically signed by Lucero Quesada MD on 7/26/2021 at 7:46 AM

## 2021-07-26 NOTE — PROGRESS NOTES
Subjective:     Hospital  Day: 3 No complaints shoulder still pretty sore. Walked to the door yesterday. Left leg better  Objective:     Patient Vitals for the past 24 hrs:   BP Temp Temp src Pulse Resp SpO2 Height Weight   07/26/21 0647 (!) 161/92 96.7 °F (35.9 °C) Temporal 89 18 94 % -- --   07/26/21 0204 -- -- -- -- -- -- 6' 1\" (1.854 m) 250 lb (113.4 kg)   07/26/21 0023 (!) 159/71 97.9 °F (36.6 °C) Temporal 80 20 93 % -- --   07/25/21 1925 (!) 140/72 97.5 °F (36.4 °C) Temporal 68 16 96 % -- --   07/25/21 1044 132/74 97.3 °F (36.3 °C) -- 71 18 94 % -- --       General: alert, appears stated age and cooperative   Wound: na    Neurovascular: Exam normal    Left thigh shows posterior bruising and tenderness around the proximal hamstrings. Left shoulder decreased range of motion without much pain. positive impingement. abduction strength 4/5         Data Review:  Recent Labs     07/23/21  1253   HGB 14.1     Recent Labs     07/23/21  1253      K 4.3   CREATININE 0.7   GLUCOSE 121*     No results for input(s): POCGLU in the last 72 hours. No orders to display         Assessment:     Left hamstring strain. Left shoulder strain. Low back sprain.  Neck sprain    Plan:   Injection left subacromial shoulder  Pain control  PT/OT  Ice and elevate  Discharge Home soon

## 2021-07-27 VITALS
SYSTOLIC BLOOD PRESSURE: 146 MMHG | TEMPERATURE: 97.9 F | DIASTOLIC BLOOD PRESSURE: 73 MMHG | RESPIRATION RATE: 16 BRPM | HEIGHT: 73 IN | OXYGEN SATURATION: 93 % | HEART RATE: 91 BPM | WEIGHT: 250 LBS | BODY MASS INDEX: 33.13 KG/M2

## 2021-07-27 PROCEDURE — 6370000000 HC RX 637 (ALT 250 FOR IP): Performed by: PHYSICIAN ASSISTANT

## 2021-07-27 PROCEDURE — 6370000000 HC RX 637 (ALT 250 FOR IP): Performed by: ORTHOPAEDIC SURGERY

## 2021-07-27 PROCEDURE — 97535 SELF CARE MNGMENT TRAINING: CPT

## 2021-07-27 PROCEDURE — 97116 GAIT TRAINING THERAPY: CPT

## 2021-07-27 PROCEDURE — 6370000000 HC RX 637 (ALT 250 FOR IP): Performed by: FAMILY MEDICINE

## 2021-07-27 PROCEDURE — 2580000003 HC RX 258: Performed by: FAMILY MEDICINE

## 2021-07-27 PROCEDURE — 97165 OT EVAL LOW COMPLEX 30 MIN: CPT

## 2021-07-27 RX ORDER — GABAPENTIN 300 MG/1
300 CAPSULE ORAL 4 TIMES DAILY
Qty: 120 CAPSULE | Refills: 0 | OUTPATIENT
Start: 2021-07-27 | End: 2021-08-26

## 2021-07-27 RX ORDER — OXYCODONE AND ACETAMINOPHEN 7.5; 325 MG/1; MG/1
1 TABLET ORAL EVERY 4 HOURS PRN
Qty: 20 TABLET | Refills: 0 | OUTPATIENT
Start: 2021-07-27 | End: 2021-07-30

## 2021-07-27 RX ADMIN — AMLODIPINE BESYLATE 5 MG: 5 TABLET ORAL at 08:35

## 2021-07-27 RX ADMIN — OXYCODONE HYDROCHLORIDE AND ACETAMINOPHEN 1 TABLET: 7.5; 325 TABLET ORAL at 05:19

## 2021-07-27 RX ADMIN — IBUPROFEN 400 MG: 400 TABLET ORAL at 08:35

## 2021-07-27 RX ADMIN — METFORMIN HYDROCHLORIDE 500 MG: 500 TABLET ORAL at 08:35

## 2021-07-27 RX ADMIN — ATORVASTATIN CALCIUM 10 MG: 10 TABLET, FILM COATED ORAL at 08:35

## 2021-07-27 RX ADMIN — HYDROCHLOROTHIAZIDE 25 MG: 25 TABLET ORAL at 08:35

## 2021-07-27 RX ADMIN — LOSARTAN POTASSIUM 100 MG: 100 TABLET, FILM COATED ORAL at 08:35

## 2021-07-27 RX ADMIN — GABAPENTIN 300 MG: 300 CAPSULE ORAL at 08:35

## 2021-07-27 RX ADMIN — SODIUM CHLORIDE, PRESERVATIVE FREE 10 ML: 5 INJECTION INTRAVENOUS at 08:35

## 2021-07-27 ASSESSMENT — PAIN DESCRIPTION - FREQUENCY
FREQUENCY: INTERMITTENT
FREQUENCY: CONTINUOUS

## 2021-07-27 ASSESSMENT — PAIN - FUNCTIONAL ASSESSMENT: PAIN_FUNCTIONAL_ASSESSMENT: PREVENTS OR INTERFERES SOME ACTIVE ACTIVITIES AND ADLS

## 2021-07-27 ASSESSMENT — PAIN SCALES - GENERAL
PAINLEVEL_OUTOF10: 6
PAINLEVEL_OUTOF10: 8
PAINLEVEL_OUTOF10: 4
PAINLEVEL_OUTOF10: 4

## 2021-07-27 ASSESSMENT — PAIN DESCRIPTION - PAIN TYPE
TYPE: NEUROPATHIC PAIN
TYPE: ACUTE PAIN

## 2021-07-27 ASSESSMENT — PAIN DESCRIPTION - DESCRIPTORS
DESCRIPTORS: BURNING
DESCRIPTORS: BURNING;ACHING;SHOOTING

## 2021-07-27 ASSESSMENT — PAIN SCALES - WONG BAKER: WONGBAKER_NUMERICALRESPONSE: 8

## 2021-07-27 ASSESSMENT — PAIN DESCRIPTION - ORIENTATION
ORIENTATION: LEFT
ORIENTATION: LEFT

## 2021-07-27 ASSESSMENT — PAIN DESCRIPTION - LOCATION
LOCATION: SHOULDER
LOCATION: SHOULDER

## 2021-07-27 NOTE — DISCHARGE INSTR - DIET

## 2021-07-27 NOTE — DISCHARGE SUMMARY
Hospital Discharge Summary    Anya Lott  :  1950  MRN:  [id number]    Admit date:  2021  Discharge date:  2021    Admitting Physician:    Manolo Ambrose MD    Discharge Diagnoses:    Principal Problem:    Tear of left hamstring  Active Problems:    Degeneration of intervertebral disc at C4-C5 level    CAD (coronary artery disease)    Essential hypertension    Mixed hyperlipidemia    Arthritis of knee    Diabetes mellitus type 2 with peripheral artery disease ([address]  Resolved Problems:    * No resolved hospital problems. [facility] Course: The patient was admitted for the above noted medical/surgical issues. Please see daily progress note for further details concerning their stay. The patient improved throughout their stay and reached maximum medical improvement on the day of discharge. The patient/family agree with the treatment plan as outlined above. All questions concerning their stay were answered prior to discharge. They understand the importance of follow up concerning any abnormal test results. Pleasant 77-year-old gentleman who was holding a letter for his son, his son fell and landed on top of him. Contusion to left shoulder and low back pain. Patient with history of chronic neck/low back pain. With all the above brought to the emergency room initially and was sent home. Patient contacted our office and was directly admitted for better pain control and orthopedic consultation. Patient was admitted and seen in consultation with Dr. Kin Kevin who gave him a cortisone shot in his left shoulder. Patient had difficult time with pain control. He was started on Neurontin for radicular pain and Percocet for the acute pain. He had been on Ultram and Norco at home which was not controlling his pain. He was evaluated by PT and OT and outpatient therapy was set up with outpatient home health care.   On the morning of 2021 he reached maximum inpatient benefit and was discharged home. Discharge Medications:       Cathye Spar   Home Medication Instructions HUV:[id number]    Printed on:07/27/21 0615   Medication Information                      aspirin 81 MG EC tablet  Take 1 tablet by mouth 2 times daily             gabapentin (NEURONTIN) 300 MG capsule  Take 1 capsule by mouth 4 times daily for 30 days. losartan-hydrochlorothiazide (HYZAAR) 100-25 MG per tablet  Take 1 tablet by mouth daily             metFORMIN (GLUCOPHAGE) 500 MG tablet  HOLD METFORMIN FOR 48 HOURS AFTER THE CARDIAC CATHETERIZATION             nitroGLYCERIN (NITROSTAT) 0.4 MG SL tablet  Place 1 tablet under the tongue every 5 minutes as needed for Chest pain             oxyCODONE-acetaminophen (PERCOCET) 7.5-325 MG per tablet  Take 1 tablet by mouth every 4 hours as needed for Pain for up to 3 days. prasugrel (EFFIENT) 10 MG TABS  Take 1 tablet by mouth daily             simvastatin (ZOCOR) 40 MG tablet  Take 40 mg by mouth nightly             traMADol (ULTRAM) 50 MG tablet  Take 50 mg by mouth every 6 hours as needed for Pain. Patient does not know frequency ordered. Consults: Orthopedics    Significant Diagnostic Studies:    See summary of labs/x-rays/path report for further details      Treatments:   Cortisone injection left shoulder/PT/OT    Disposition:   Home with home health  Follow up with Karina Landaverde MD within the week for hospital follow-up which can be done via TeleMed if ambulation is difficult for the patient.     Signed:  Karina Landaverde MD   7/27/2021, 8:39 AM

## 2021-07-27 NOTE — PROGRESS NOTES
[facility]  Daily Progress Note  Katie Holly  MRN: [id number] LOS: 1    Admit Date: 2021 8:00 AM          Chief Complaint:  Crush injury resulting in left hamstring tear    Interval History:    Reviewed overnight events and nursing notes  Denies chest pain  No shortness of breath  Unable to rest last night  Poor pain control      DIET:  ADULT DIET; Regular    Medications:      sodium chloride        amLODIPine  5 mg Oral Daily    methylPREDNISolone acetate  80 mg Intramuscular Once    gabapentin  300 mg Oral 4x Daily    ibuprofen  400 mg Oral TID WC    sodium chloride flush  5-40 mL Intravenous 2 times per day    metFORMIN  500 mg Oral Daily with breakfast    atorvastatin  10 mg Oral Daily    losartan  100 mg Oral Daily    And    hydroCHLOROthiazide  25 mg Oral Daily       Data:     Code Status: Full Code    Family History   Problem Relation Age of Onset    Heart Disease Mother     Diabetes Mother     Cancer Father         LUNG CA     Social History     Socioeconomic History    Marital status:      Spouse name: Alexandrea Roper Number of children: Not on file    Years of education: Not on file    Highest education level: Not on file   Occupational History    Not on file   Tobacco Use    Smoking status: Former Smoker     Types: Cigarettes     Quit date: 1995     Years since quittin.3    Smokeless tobacco: Never Used   Vaping Use    Vaping Use: Never used   Substance and Sexual Activity    Alcohol use: No    Drug use: No    Sexual activity: Not on file   Other Topics Concern    Not on file   Social History Narrative    Not on file     Social Determinants of Health     Financial Resource Strain:     Difficulty of Paying Living Expenses:    Food Insecurity:     Worried About Running Out of Food in the Last Year:     Ran Out of Food in the Last Year:    Transportation Needs:     Lack of Transportation (Medical):      Lack of Transportation (Non-Medical): auscultation both anterior and posterior, no rales rhonchi or wheeze appreciated. ABD: soft NT/ND with normoactive BS noted. No guarding or rebounding noted  /rectal: deferred  EXT: decreased ROM of bilateral lower ext with L>R, hematoma noted with good pulses noted distally  DERM: skin is warm and dry without sig rashes on exposed areas, hematoma noted  PSYCH: appears mentally stable with no obvious abnormalities  NEURO: CN 2-12 apper grossly intact without sig deficits in motor or sensory         Assessment and Plan:       Hospital Problem list:  Active Problems:    Tear of left hamstring  Atherosclerotic coronary artery disease---s/p CABG four-vessel  Osteoarthritis--s/p TKA last year  Trauma on multiple anticoagulants--aspirin and Effient  Gait instability  Essential hypertension--Hyzaar 100/25. BP slightly up but stable. Add Norvasc for better blood pressure control. Diabetes mellitus type 2--continue with Metformin  Mixed hyperlipidemia--statin therapy  Chronic back and neck pain--s/p ACDF & lumbar laminectomy      Treatment Plan:  Work on better pain control  Ice for anti-inflammatory effect  Muscle relaxer  Will monitor closely. Judicious use of opioids. Patient with chronic pain syndrome neck and back. Ortho consult reviewed and appreciated  Hold blood thinners  Continue maximize efforts with physical therapy  Patient states he is not ready for discharge secondary to immobility and pain left shoulder.      Discharge planning: Home possibly today. Appreciate Ortho's help thus far. Difficult situation with pain control. Reviewed treatment plans with the patient, family and nursing staff  30 minutes spent in face to face interaction and coordination of care. Electronically signed by Dima Plaza PA-C on 7/27/2021 at 7:59 AM     Will get consult eighth floor   Work on disposition. I have discussed the care of Marcelokushal Brooks, including pertinent history and exam findings with the ARNP/PA. I have seen and examined the patient and the key elements of all parts of the encounter have been performed by me. I agree with the assessment and plan as outlined by the ARNP/PA. Please refer to my separate note for complete documentation.      Electronically signed by Paige Minor MD on 7/27/2021 at 8:09 AM

## 2021-07-27 NOTE — PROGRESS NOTES
Physical Therapy  Name: Ilan Iraheta  MRN:  [id number]  Date of service:  7/27/2021 07/27/21 7779   Restrictions/Precautions   Restrictions/Precautions Fall Risk   General   Chart Reviewed Yes   Subjective   Subjective Pt in bed, agrees to work with therapy. Pain Screening   Patient Currently in Pain Yes   Pain Assessment   Pain Assessment 0-10   Pain Level 4   Pain Type Acute pain   Pain Location Shoulder   Pain Orientation Left   Pain Descriptors Burning   Pain Frequency Continuous   Functional Pain Assessment Prevents or interferes some active activities and ADLs   Non-Pharmaceutical Pain Intervention(s) Repositioned; Rest   Response to Pain Intervention Patient Satisfied  (once positioned for comfort)   Bed Mobility   Supine to Sit Stand by assistance   Sit to Supine Stand by assistance   Transfers   Sit to Stand Minimal Assistance  (bed elevated)   Stand to sit Contact guard assistance   Ambulation   Ambulation? Yes   WB Status fwb   Ambulation 1   Surface level tile   Device Rolling Walker   Assistance Contact guard assistance   Quality of Gait steady overall, may benefit from [employer] instead of rwx d/t L shoulder pain   Gait Deviations Slow Mallika; Increased ARETHA; Decreased step length;Decreased step height   Distance 20'   Comments amb in room   Short term goals   Time Frame for Short term goals 2 weeks   Short term goal 1 Sit<>Stand w/ SBA   Short term goal 2 Ambulate 75 ft w/ rw w/ CGA   Conditions Requiring Skilled Therapeutic Intervention   Body structures, Functions, Activity limitations Decreased functional mobility ; Decreased ADL status; Decreased strength;Decreased endurance; Increased pain;Decreased balance   Assessment Pt amb well with therapy, steady overall with rwx but does increase L shoulder pain, may benefit from [employer]. Pt declined up to chair, returned to supine and positioned for comfort with all needs in reach.    Activity Tolerance   Activity Tolerance Patient Tolerated treatment well;Patient limited by pain   Safety Devices   Type of devices Bed alarm in place;Call light within reach;Gait belt;Left in bed         Electronically signed by Norah Moreno PTA on 7/27/2021 at 9:07 AM

## 2021-07-27 NOTE — PROGRESS NOTES
Occupational Therapy   Occupational Therapy Initial Assessment  Date: 2021   Patient Name: Sherlyn Sarkar  MRN: [id number]     : 1950    Date of Service: 2021    Discharge Recommendations:  Home with Home health OT  OT Equipment Recommendations  Equipment Needed: Yes  Mobility Devices: Kit Champagne: Rolling    Assessment   Performance deficits / Impairments: Decreased functional mobility ; Decreased ADL status; Decreased endurance;Decreased strength;Decreased balance  Assessment: OT evaluation completed. OT to increase strength and function for safety of ADLS. OT treatment for ADLS at home. Prognosis: Good  Decision Making: Low Complexity  OT Education: OT Role;ADL Adaptive Strategies  Patient Education: Pt educated regarding ADLS and adaptation in the home to increase safety. REQUIRES OT FOLLOW UP: Yes  Activity Tolerance  Activity Tolerance: Pt has lay in bed to get his pain to decrease in the LUE and neck. Safety Devices  Safety Devices in place: Yes  Type of devices: Nurse notified; Left in bed;Call light within reach           Patient Diagnosis(es): The primary encounter diagnosis was Degeneration of intervertebral disc at C4-C5 level. A diagnosis of Cervical myelopathy (HCC) was also pertinent to this visit. has a past medical history of CAD (coronary artery disease), Cervical myelopathy (HCC), Degeneration of intervertebral disc at C4-C5 level, Diabetes mellitus ([facility] [address] Hyperlipidemia, Hypertension, and Knee pain. has a past surgical history that includes back surgery; knee surgery (Right); Cholecystectomy; cervical fusion (N/A, 2019); Diagnostic Cardiac Cath Lab Procedure; Cardiac surgery (1999); and Total knee arthroplasty (Right, 2020). Restrictions  Restrictions/Precautions  Restrictions/Precautions: Fall Risk    Subjective   General  Chart Reviewed: Yes  Patient assessed for rehabilitation services?: Yes  Additional Pertinent Hx: HTN, DMII, Deg.  Disc C4-5, cervical myelopathy, CAD, Left arm pain, hand pain, C3-C5 fusion. Family / Caregiver Present: Yes  Referring Practitioner: Pam Castaneda. Phani Sanchez MD  Diagnosis: Tear of left hamstring initial encounter  General Comment  Comments: Pt having LUE pain down into hand. Pt is to have an MRI in a few weeks. Pt has edema of the posterior scapula. Pt has had a cortizone shot in LUE.     Social/Functional History  Social/Functional History  Lives With: Spouse  Type of Home: House  Home Layout: One level  Home Access: Stairs to enter with rails  Entrance Stairs - Number of Steps: 5  Entrance Stairs - Rails: Both  Bathroom Shower/Tub: Tub/Shower unit, Walk-in shower  Bathroom Toilet: Handicap height  Bathroom Accessibility: Accessible  Home Equipment: Rolling walker, Cane  ADL Assistance: Independent  Homemaking Assistance: Independent  Ambulation Assistance: Independent  Transfer Assistance: Independent  Active : Yes  Occupation: Retired       Objective        Orientation  Overall Orientation Status: Within Normal Limits  Observation/Palpation  Posture: Good  Observation: Very large dark purple bruise on back of LLE  Balance  Sitting Balance: Independent  Standing Balance: Contact guard assistance  Functional Mobility  Functional - Mobility Device: Rolling Walker  Assist Level: Contact guard assistance  Toilet Transfers  Toilet Transfer: Contact guard assistance  ADL  Feeding: Independent  Grooming: Contact guard assistance  UE Bathing: Independent  LE Bathing: Independent  UE Dressing: Independent  LE Dressing: Independent  Toileting: Contact guard assistance   Bed mobility  Rolling to Left: Independent  Supine to Sit: Stand by assistance  Sit to Supine: Stand by assistance  Transfers  Stand Pivot Transfers: Contact guard assistance  Sit to stand: Contact guard assistance     Cognition  Overall Cognitive Status: WNL   LUE AROM (degrees)  LUE AROM : WNL  RUE AROM (degrees)  RUE AROM : WNL  LUE Strength  LUE Strength Comment: 4/5  RUE Strength  RUE Strength Comment: 4/5    OT treatment of 15 minutes for ADLS. Plan   Plan  Times per week: 3-5x wk  Times per day: Daily  Plan weeks: 2  Current Treatment Recommendations: Strengthening, Balance Training, Functional Mobility Training, Endurance Training, Patient/Caregiver Education & Training, Safety Education & Training, Pain Management  Plan Comment: OT to treat 3-5x wk for increasing safety and independence of ADLS. Goals  Short term goals  Time Frame for Short term goals: 2 weeks  Short term goal 1: To increase safety of toilet transfers. Short term goal 2: To increase activity tolerance to 10 minutes to increase strength and functional mobility. Short term goal 3: To increase safety of independence of ADLS. Long term goals  Long term goal 1: To return to PLOF. Patient Goals   Patient goals : To return home with home health.        Therapy Time   Individual Concurrent Group Co-treatment   Time In           Time Out           Minutes                   Martínez Cespedes OT Electronically signed by BHAVANA Jarquin MOT, OTR/L on 7/27/2021 at 9:53 AM

## 2021-08-06 NOTE — PROGRESS NOTES
Physician Progress Note      Enoc Morin  [facility] #:                  [id number]  :                       1950  ADMIT DATE:       2021 10:23 AM  DISCH DATE:        2021 10:43 AM  RESPONDING  PROVIDER #:        Chuck Tejada MD          QUERY TEXT:    Pt admitted with left hamstring tear. .  Noted documentation of chief complaint   of Crush injury resulting in left hamstring tear . If possible, please   document in progress notes and discharge summary:    The medical record reflects the following:  Risk Factors: Fall from ladder, cervical sprain after previous cervical   fusion. Probable left rotator cuff tear acute on chronic. Lumbar sprain after   previous lumbar fusion. Probable left proximal hamstring strain  Clinical Indicators: Chief complaint Crush injury resulting in left hamstring   tear  Treatment: Ortho consult, OT, PT. muscle relaxer, oxycodone,    Thank you    Meredith Velez RN, BSN,[facility]  [phone]  Options provided:  -- Crush injury  not clinically significant  -- Crush injury  is clinically significant  -- Other - I will add my own diagnosis  -- Disagree - Not applicable / Not valid  -- Disagree - Clinically unable to determine / Unknown  -- Refer to Clinical Documentation Reviewer    PROVIDER RESPONSE TEXT:    This patient has clinically significant crush injury .     Query created by: Gab Conde on 2021 2:39 PM      Electronically signed by:  Chuck Tejada MD 2021 8:36 PM

## 2021-08-19 ENCOUNTER — HOSPITAL ENCOUNTER (OUTPATIENT)
Dept: MRI IMAGING | Age: 71
Discharge: HOME | End: 2021-08-19
Payer: MEDICARE

## 2021-08-19 DIAGNOSIS — M25.512 LEFT SHOULDER PAIN, UNSPECIFIED CHRONICITY: ICD-10-CM

## 2021-08-19 PROCEDURE — 73221 MRI JOINT UPR EXTREM W/O DYE: CPT

## 2022-01-12 ENCOUNTER — TELEPHONE (OUTPATIENT)
Dept: UROLOGY | Facility: CLINIC | Age: 72
End: 2022-01-12

## 2022-01-12 NOTE — PROGRESS NOTES
"Chief Complaint  Kidney stone    Subjective          Christiano Ho presents to [facility] for assessment of a left proximal ureteral calculus.  2 weeks ago patient was then [place], [place].  Developed sudden severe onset of flank pain.  Seen in urgent care center.  CT showed a left 4 mm proximal ureteral calculus.  Currently his pain is intermittent. Took first pain pill in two weeks yesterday. Pain came back in flank again. No radiation. \"Typical\" stone pain.       Current Outpatient Medications:   •  aspirin (aspirin) 81 MG EC tablet, Take 1 tablet by mouth Daily., Disp: , Rfl:   •  atorvastatin (LIPITOR) 40 MG tablet, Take 40 mg by mouth Daily., Disp: , Rfl:   •  celecoxib (CeleBREX) 200 MG capsule, , Disp: , Rfl:   •  losartan-hydrochlorothiazide (HYZAAR) 100-25 MG per tablet, Take 1 tablet by mouth Daily., Disp: , Rfl:   •  metFORMIN (GLUCOPHAGE) 500 MG tablet, Take 500 mg by mouth Daily With Breakfast., Disp: , Rfl:   •  traMADol (ULTRAM) 50 MG tablet, Take 50 mg by mouth Every 6 (Six) Hours As Needed. for pain, Disp: , Rfl:   •  gabapentin (NEURONTIN) 300 MG capsule, , Disp: , Rfl:   •  meloxicam (MOBIC) 15 MG tablet, Take 15 mg by mouth Daily., Disp: , Rfl:   •  tamsulosin (FLOMAX) 0.4 MG capsule 24 hr capsule, Take 1 capsule by mouth Daily. To help pass stone or tolerate stent, Disp: 30 capsule, Rfl: 1  Past Medical History:   Diagnosis Date   • Arthritis     KNEES   • Cataract    • Coronary artery disease    • Diabetes mellitus (HCC)    • Elevated cholesterol    • High cholesterol    • Hypertension    • PONV (postoperative nausea and vomiting)      Past Surgical History:   Procedure Laterality Date   • BACK SURGERY  1970    TIMES 3 FROM INJURY /LUMBAR    • CARPAL TUNNEL RELEASE Bilateral    • CHOLECYSTECTOMY     • CIRCUMCISION N/A 9/4/2019    Procedure: CIRCUMCISION;  Surgeon: Ian Terrell MD;  Location: [facility];  Service: Urology   • CORONARY ARTERY BYPASS GRAFT  1999 " "  • KNEE ARTHROSCOPY Right    • NECK SURGERY  2019    BY [facility]     Review  of systems  Constitutional: Negative for chills or fever.   Gastrointestinal: Negative for abdominal pain, anal bleeding or blood in stool.   Genitourinary: Negative for gross hematuria or dysuria    Objective   PHYSICAL EXAM  Vital Signs:   Temp 98.1 °F (36.7 °C)   Ht 182.9 cm (72\")   Wt 110 kg (242 lb)   BMI 32.82 kg/m²     Constitutional: Patient is without distress or deformity.  Vital signs are reviewed as above.    Neuro: No confusion; No disorientation; Alert and oriented  Pulmonary: No respiratory distress.   Skin: No pallor or diaphoresis  GI: Slight abdominal distension      DATA  Result Review :              Results for orders placed or performed in visit on 01/13/22   POC Urinalysis Dipstick, Multipro    Specimen: Urine   Result Value Ref Range    Color Yellow Yellow, Straw, Dark Yellow, Lindsay    Clarity, UA Clear Clear    Glucose, UA Negative Negative, 1000 mg/dL (3+) mg/dL    Bilirubin Negative Negative    Ketones, UA Negative Negative    Specific Gravity  1.020 1.005 - 1.030    Blood, UA Large (A) Negative    pH, Urine 5.5 5.0 - 8.0    Protein, POC 30 mg/dL (A) Negative mg/dL    Urobilinogen, UA Normal Normal    Nitrite, UA Negative Negative    Leukocytes Negative Negative       Independent review of a CT scan of abdomen and pelvis without contrast  The CT scan of the abdomen/pelvis done without contrast is available for me to review.  Treatment recommendations require an independent review.  First I scanned the liver, spleen, and bowel pattern.  The retroperitoneum including the major vessels and lymphatic packages are briefly reviewed.  This film has been reviewed by the radiologist to determine any nonurologic abnormalities that are present.  The kidneys are closely inspected for size, symmetry, contour, parenchymal thickness, perinephric reaction, presence of calcifications, and intrarenal dilation of the collecting " system.  The ureters are inspected for their course, caliber, and any calcifications.  The bladder is inspected for its thickness, size, and presence of any calcifications.  This scan shows left proximal ureteral calculus is about 4 mm in size.  This is at the L3 vertebral level.  There are also small calcifications in each kidney..     ASSESSMENT AND PLAN          Problem List Items Addressed This Visit     None      Visit Diagnoses     Ureteral calculus, left proximal ureter    -  Primary    Relevant Medications    tamsulosin (FLOMAX) 0.4 MG capsule 24 hr capsule    Other Relevant Orders    XR Abdomen KUB (Completed)    XR abdomen kub    POC Urinalysis Dipstick, Multipro (Completed)      The patient has a 4 mm proximal  Ureter as defined by symptoms and radiographic studies reviewed with the patient.  Further evaluation will include stone analysis, retrograde pyelogram,, and postoperative renal ultrasound to be sure the obstruction has resolved.  Options for the management of such stone is discussed based upon size of stone, location, symptoms, and probable composition including watchful waiting, ESWL, ureteroscopy , use of ureteral stenting, percutaneous management, and open surgery.  Based upon this discussion we have elected to proceed with ureteroscopic management of the stone. Will put him on tamsulosin.  The patient understands that a laser or other fragmentation device may be needed.  The need for ureteral stenting including its required removal was discussed.  Risks of pain, discomfort from stent, ureteral stricture that may occur in the future, inability to render stone free, ureteral avulsion/perforation are all discussed.  I explained that in extreme cases this could lead to loss of function of the affected kidney.      FOLLOW UP     No follow-ups on file.        (Please note that portions of this note were completed with a voice recognition program.)  Ian Terrell MD  01/13/22  09:18 CST

## 2022-01-12 NOTE — TELEPHONE ENCOUNTER
Called pt to let him know he will need a KUB prior to his apt on 01/13 with dr wooten and that he will also need to bring a disc with him to his apt.

## 2022-01-13 ENCOUNTER — OFFICE VISIT (OUTPATIENT)
Dept: UROLOGY | Facility: CLINIC | Age: 72
End: 2022-01-13

## 2022-01-13 ENCOUNTER — HOSPITAL ENCOUNTER (OUTPATIENT)
Dept: GENERAL RADIOLOGY | Facility: HOSPITAL | Age: 72
Discharge: HOME | End: 2022-01-13
Admitting: UROLOGY

## 2022-01-13 VITALS — HEIGHT: 72 IN | BODY MASS INDEX: 32.78 KG/M2 | TEMPERATURE: 98.1 F | WEIGHT: 242 LBS

## 2022-01-13 DIAGNOSIS — N20.1 URETERAL CALCULUS, LEFT: Primary | ICD-10-CM

## 2022-01-13 DIAGNOSIS — N20.1 URETERAL CALCULUS, LEFT: ICD-10-CM

## 2022-01-13 LAB
BILIRUB BLD-MCNC: NEGATIVE MG/DL
CLARITY, POC: CLEAR
COLOR UR: YELLOW
GLUCOSE UR STRIP-MCNC: NEGATIVE MG/DL
KETONES UR QL: NEGATIVE
LEUKOCYTE EST, POC: NEGATIVE
NITRITE UR-MCNC: NEGATIVE MG/ML
PH UR: 5.5 [PH] (ref 5–8)
PROT UR STRIP-MCNC: ABNORMAL MG/DL
RBC # UR STRIP: ABNORMAL /UL
SP GR UR: 1.02 (ref 1–1.03)
UROBILINOGEN UR QL: NORMAL

## 2022-01-13 PROCEDURE — 74018 RADEX ABDOMEN 1 VIEW: CPT

## 2022-01-13 PROCEDURE — 99214 OFFICE O/P EST MOD 30 MIN: CPT | Performed by: UROLOGY

## 2022-01-13 PROCEDURE — 81001 URINALYSIS AUTO W/SCOPE: CPT | Performed by: UROLOGY

## 2022-01-13 RX ORDER — GABAPENTIN 300 MG/1
CAPSULE ORAL
COMMUNITY
Start: 2021-10-13

## 2022-01-13 RX ORDER — CELECOXIB 200 MG/1
CAPSULE ORAL
COMMUNITY
Start: 2021-11-11

## 2022-01-13 RX ORDER — ASPIRIN 81 MG/1
1 TABLET ORAL DAILY
COMMUNITY

## 2022-01-13 RX ORDER — TRAMADOL HYDROCHLORIDE 50 MG/1
50 TABLET ORAL EVERY 6 HOURS PRN
COMMUNITY
Start: 2021-10-13

## 2022-01-13 RX ORDER — TAMSULOSIN HYDROCHLORIDE 0.4 MG/1
1 CAPSULE ORAL DAILY
Qty: 30 CAPSULE | Refills: 1 | Status: SHIPPED | OUTPATIENT
Start: 2022-01-13 | End: 2022-01-17

## 2022-01-17 RX ORDER — TAMSULOSIN HYDROCHLORIDE 0.4 MG/1
CAPSULE ORAL
Qty: 90 CAPSULE | Refills: 3 | Status: ON HOLD | OUTPATIENT
Start: 2022-01-17 | End: 2022-09-20

## 2022-09-08 ENCOUNTER — OFFICE VISIT (OUTPATIENT)
Dept: GASTROENTEROLOGY | Facility: CLINIC | Age: 72
End: 2022-09-08

## 2022-09-08 VITALS
BODY MASS INDEX: 32.23 KG/M2 | DIASTOLIC BLOOD PRESSURE: 78 MMHG | OXYGEN SATURATION: 98 % | HEIGHT: 72 IN | TEMPERATURE: 96.8 F | HEART RATE: 69 BPM | SYSTOLIC BLOOD PRESSURE: 140 MMHG | WEIGHT: 238 LBS

## 2022-09-08 DIAGNOSIS — R19.5 POSITIVE COLORECTAL CANCER SCREENING USING COLOGUARD TEST: Primary | ICD-10-CM

## 2022-09-08 DIAGNOSIS — Z80.0 FAMILY HX OF COLON CANCER: ICD-10-CM

## 2022-09-08 PROCEDURE — 99204 OFFICE O/P NEW MOD 45 MIN: CPT | Performed by: NURSE PRACTITIONER

## 2022-09-08 RX ORDER — SODIUM PICOSULFATE, MAGNESIUM OXIDE, AND ANHYDROUS CITRIC ACID 10; 3.5; 12 MG/160ML; G/160ML; G/160ML
160 LIQUID ORAL ONCE
Qty: 320 ML | Refills: 0 | Status: SHIPPED | OUTPATIENT
Start: 2022-09-08 | End: 2022-09-08

## 2022-09-08 NOTE — PROGRESS NOTES
Chief Complaint   Patient presents with   • GI Problem     Positive cologuard       PCP: Oskar Badillo MD  REFER: Oskar Badillo MD    Subjective     HPI    He presents to office with positive cologaurd test from PCP office in 7/2022.  Coarse is constant.  Length of time test has been positive is unknown.  Testing performed as part of routine physical.  He denies change in bowels.  Bowels described as moving without difficulty, no change. No abdominal pain.  No BRBPR.  No melena.  Weight is stable.  He has undergone previous colonoscopy evaluation.  There is a family history of colon cancer or colon polyps.        Last colonoscopy more than 10 years ago. No colon polyps or colon cancer.   Son had colon cancer in his early 30's.  Maternal GF had colon cancer in his 80's.     Past Medical History:   Diagnosis Date   • Arthritis     KNEES   • Bell's palsy    • Cataract    • Coronary artery disease    • Diabetes mellitus (HCC)    • Elevated cholesterol    • High cholesterol    • Hypertension    • PONV (postoperative nausea and vomiting)        Past Surgical History:   Procedure Laterality Date   • BACK SURGERY  1970    TIMES 3 FROM INJURY /LUMBAR    • CARPAL TUNNEL RELEASE Bilateral    • CHOLECYSTECTOMY     • CIRCUMCISION N/A 9/4/2019    Procedure: CIRCUMCISION;  Surgeon: Ian Terrell MD;  Location: [facility] OR;  Service: Urology   • CORONARY ARTERY BYPASS GRAFT  1999   • KNEE ARTHROSCOPY Right    • NECK SURGERY  2019    [facility]       Outpatient Medications Marked as Taking for the 9/8/22 encounter (Office Visit) with Pia Laguerre APRN   Medication Sig Dispense Refill   • aspirin 81 MG EC tablet Take 1 tablet by mouth Daily.     • atorvastatin (LIPITOR) 40 MG tablet Take 40 mg by mouth Daily.     • celecoxib (CeleBREX) 200 MG capsule      • gabapentin (NEURONTIN) 300 MG capsule      • losartan-hydrochlorothiazide (HYZAAR) 100-25 MG per tablet Take 1 tablet by mouth Daily.     • metFORMIN  "(GLUCOPHAGE) 500 MG tablet Take 500 mg by mouth Daily With Breakfast.     • traMADol (ULTRAM) 50 MG tablet Take 50 mg by mouth Every 6 (Six) Hours As Needed. for pain         No Known Allergies    Social History     Socioeconomic History   • Marital status:    Tobacco Use   • Smoking status: Former Smoker     Quit date: 2004     Years since quittin.0   • Smokeless tobacco: Never Used   Vaping Use   • Vaping Use: Never used   Substance and Sexual Activity   • Alcohol use: No   • Drug use: No   • Sexual activity: Defer       Family History   Problem Relation Age of Onset   • No Known Problems Mother    • No Known Problems Father    • Colon cancer Maternal Grandfather    • Colon cancer Son        Review of Systems   Constitutional: Negative for chills, fever and unexpected weight change.   Respiratory: Negative for cough, shortness of breath and wheezing.    Cardiovascular: Negative for chest pain and palpitations.   Gastrointestinal: Negative for abdominal distention, abdominal pain, anal bleeding, blood in stool, constipation, diarrhea, nausea and vomiting.       Objective     Vitals:    22 0857   BP: 140/78   Pulse: 69   Temp: 96.8 °F (36 °C)   SpO2: 98%   Weight: 108 kg (238 lb)   Height: 182.9 cm (72\")     Body mass index is 32.28 kg/m².    Physical Exam  Vitals reviewed.   Constitutional:       General: He is not in acute distress.  Cardiovascular:      Rate and Rhythm: Normal rate and regular rhythm.      Heart sounds: Normal heart sounds.   Pulmonary:      Effort: Pulmonary effort is normal.      Breath sounds: Normal breath sounds.   Abdominal:      General: Bowel sounds are normal. There is no distension.      Palpations: Abdomen is soft.      Tenderness: There is no abdominal tenderness.   Skin:     General: Skin is warm and dry.   Neurological:      Mental Status: He is alert.         Imaging Results (Most Recent)     None          Body mass index is 32.28 kg/m².    Assessment & Plan "     Diagnoses and all orders for this visit:    1. Positive colorectal cancer screening using Cologuard test (Primary)  -     Case Request; Standing  -     Case Request    2. Family hx of colon cancer    Other orders  -     Sod Picosulfate-Mag Ox-Cit Acd (Clenpiq) 10-3.5-12 MG-GM -GM/160ML solution; Take 160 mL by mouth 1 (One) Time for 1 dose. Take as directed  Dispense: 320 mL; Refill: 0  -     Follow Anesthesia Guidelines / Protocol; Future  -     Obtain Informed Consent; Future        COLONOSCOPY WITH ANESTHESIA (N/A)      I have explained a positive cologuard indicates the presence of DNA/hgb in stool that is associated with colon polyps or colon cancer.  There is a chance the test is a false positive with that chance being higher for people over the age of 65. This is due to normal changes in DNA associated with getting older (AGA).  Due to the positive result of the Cologuard I recommend pt undergoing colonoscopy.  Colonoscopy remains the gold standard for detection of colon polyps/colon cancer.      All risks, benefits, alternatives, and indications of colonoscopy procedure have been discussed with the patient. Risks to include perforation of the colon requiring possible surgery or colostomy, risk of bleeding from biopsies or removal of colon tissue, possibility of missing a colon polyp or cancer, or adverse drug reaction.  Benefits to include the diagnosis and management of disease of the colon and rectum.  Pt verbalizes understanding and agrees to proceed with procedure.

## 2022-09-09 PROBLEM — R19.5 POSITIVE COLORECTAL CANCER SCREENING USING COLOGUARD TEST: Status: ACTIVE | Noted: 2022-09-09

## 2022-09-20 ENCOUNTER — ANESTHESIA EVENT (OUTPATIENT)
Dept: GASTROENTEROLOGY | Facility: HOSPITAL | Age: 72
End: 2022-09-20

## 2022-09-20 ENCOUNTER — ANESTHESIA (OUTPATIENT)
Dept: GASTROENTEROLOGY | Facility: HOSPITAL | Age: 72
End: 2022-09-20

## 2022-09-20 ENCOUNTER — HOSPITAL ENCOUNTER (OUTPATIENT)
Facility: HOSPITAL | Age: 72
Setting detail: HOSPITAL OUTPATIENT SURGERY
Discharge: HOME | End: 2022-09-20
Attending: INTERNAL MEDICINE | Admitting: INTERNAL MEDICINE

## 2022-09-20 VITALS
OXYGEN SATURATION: 97 % | HEART RATE: 59 BPM | BODY MASS INDEX: 31.56 KG/M2 | WEIGHT: 233 LBS | SYSTOLIC BLOOD PRESSURE: 122 MMHG | HEIGHT: 72 IN | RESPIRATION RATE: 18 BRPM | DIASTOLIC BLOOD PRESSURE: 76 MMHG | TEMPERATURE: 96.6 F

## 2022-09-20 DIAGNOSIS — R19.5 POSITIVE COLORECTAL CANCER SCREENING USING COLOGUARD TEST: ICD-10-CM

## 2022-09-20 LAB — GLUCOSE BLDC GLUCOMTR-MCNC: 99 MG/DL (ref 70–130)

## 2022-09-20 PROCEDURE — 25010000002 PROPOFOL 10 MG/ML EMULSION: Performed by: NURSE ANESTHETIST, CERTIFIED REGISTERED

## 2022-09-20 PROCEDURE — 82962 GLUCOSE BLOOD TEST: CPT

## 2022-09-20 PROCEDURE — 45385 COLONOSCOPY W/LESION REMOVAL: CPT | Performed by: INTERNAL MEDICINE

## 2022-09-20 PROCEDURE — 88305 TISSUE EXAM BY PATHOLOGIST: CPT | Performed by: INTERNAL MEDICINE

## 2022-09-20 DEVICE — DEV CLIP ENDO RESOLUTION360 CONTRL ROT 235CM: Type: IMPLANTABLE DEVICE | Site: COLON | Status: FUNCTIONAL

## 2022-09-20 RX ORDER — ONDANSETRON 2 MG/ML
4 INJECTION INTRAMUSCULAR; INTRAVENOUS ONCE AS NEEDED
Status: DISCONTINUED | OUTPATIENT
Start: 2022-09-20 | End: 2022-09-20 | Stop reason: HOSPADM

## 2022-09-20 RX ORDER — SODIUM CHLORIDE 0.9 % (FLUSH) 0.9 %
10 SYRINGE (ML) INJECTION AS NEEDED
Status: DISCONTINUED | OUTPATIENT
Start: 2022-09-20 | End: 2022-09-20 | Stop reason: HOSPADM

## 2022-09-20 RX ORDER — LIDOCAINE HYDROCHLORIDE 10 MG/ML
0.5 INJECTION, SOLUTION EPIDURAL; INFILTRATION; INTRACAUDAL; PERINEURAL ONCE AS NEEDED
Status: DISCONTINUED | OUTPATIENT
Start: 2022-09-20 | End: 2022-09-20 | Stop reason: HOSPADM

## 2022-09-20 RX ORDER — PROPOFOL 10 MG/ML
VIAL (ML) INTRAVENOUS AS NEEDED
Status: DISCONTINUED | OUTPATIENT
Start: 2022-09-20 | End: 2022-09-20 | Stop reason: SURG

## 2022-09-20 RX ORDER — SODIUM CHLORIDE 9 MG/ML
500 INJECTION, SOLUTION INTRAVENOUS CONTINUOUS PRN
Status: DISCONTINUED | OUTPATIENT
Start: 2022-09-20 | End: 2022-09-20 | Stop reason: HOSPADM

## 2022-09-20 RX ADMIN — PROPOFOL 50 MG: 10 INJECTION, EMULSION INTRAVENOUS at 11:35

## 2022-09-20 RX ADMIN — PROPOFOL 50 MG: 10 INJECTION, EMULSION INTRAVENOUS at 11:32

## 2022-09-20 RX ADMIN — PROPOFOL 50 MG: 10 INJECTION, EMULSION INTRAVENOUS at 11:50

## 2022-09-20 RX ADMIN — PROPOFOL 100 MG: 10 INJECTION, EMULSION INTRAVENOUS at 11:21

## 2022-09-20 RX ADMIN — PROPOFOL 50 MG: 10 INJECTION, EMULSION INTRAVENOUS at 11:45

## 2022-09-20 RX ADMIN — PROPOFOL 100 MG: 10 INJECTION, EMULSION INTRAVENOUS at 11:26

## 2022-09-20 RX ADMIN — PROPOFOL 50 MG: 10 INJECTION, EMULSION INTRAVENOUS at 11:38

## 2022-09-20 RX ADMIN — PROPOFOL 50 MG: 10 INJECTION, EMULSION INTRAVENOUS at 11:29

## 2022-09-20 RX ADMIN — SODIUM CHLORIDE 500 ML: 9 INJECTION, SOLUTION INTRAVENOUS at 10:17

## 2022-09-20 NOTE — ANESTHESIA PREPROCEDURE EVALUATION
Anesthesia Evaluation     no history of anesthetic complications:  NPO Solid Status: > 8 hours  NPO Liquid Status: > 2 hours           Airway   Mallampati: II  TM distance: >3 FB  Neck ROM: full  Dental    (+) lower dentures and upper dentures    Pulmonary    (-) COPD, asthma, sleep apnea, not a smoker  Cardiovascular   Exercise tolerance: good (4-7 METS)    ECG reviewed    (+) hypertension, CAD, CABG (4V), cardiac stents more than 12 months ago hyperlipidemia,   (-) pacemaker, past MI, angina      Neuro/Psych  (-) seizures, TIA, CVA  GI/Hepatic/Renal/Endo    (+) obesity,   renal disease stones, diabetes mellitus,   (-) GERD, liver disease    Musculoskeletal     Abdominal    Substance History      OB/GYN          Other                          Anesthesia Plan    ASA 3     MAC     intravenous induction     Anesthetic plan, risks, benefits, and alternatives have been provided, discussed and informed consent has been obtained with: patient.

## 2022-09-20 NOTE — ANESTHESIA POSTPROCEDURE EVALUATION
Patient: Christiano SAMANO    Procedure Summary     Date: 09/20/22 Room / Location: [facility] ENDOSCOPY 4 / [facility] ENDOSCOPY    Anesthesia Start: 1119 Anesthesia Stop: 1157    Procedure: COLONOSCOPY WITH ANESTHESIA (N/A ) Diagnosis:       Positive colorectal cancer screening using Cologuard test      (Positive colorectal cancer screening using Cologuard test [R19.5])    Surgeons: Yann Burnett MD Provider: Vance Carpenter CRNA    Anesthesia Type: MAC ASA Status: 3          Anesthesia Type: MAC    Vitals  Vitals Value Taken Time   /59 09/20/22 1201   Temp     Pulse 72 09/20/22 1202   Resp 19 09/20/22 1155   SpO2 93 % 09/20/22 1202   Vitals shown include unvalidated device data.        Post Anesthesia Care and Evaluation    Patient location during evaluation: PHASE II  Patient participation: complete - patient participated  Level of consciousness: awake and alert  Pain management: adequate    Airway patency: patent  Anesthetic complications: No anesthetic complications  PONV Status: none  Cardiovascular status: acceptable and stable  Respiratory status: acceptable and unassisted  Hydration status: acceptable

## 2022-09-29 LAB
CYTO UR: NORMAL
LAB AP CASE REPORT: NORMAL
Lab: NORMAL
PATH REPORT.FINAL DX SPEC: NORMAL
PATH REPORT.GROSS SPEC: NORMAL

## 2022-10-10 ENCOUNTER — TELEPHONE (OUTPATIENT)
Dept: FAMILY MEDICINE CLINIC | Age: 72
End: 2022-10-10

## 2022-10-10 NOTE — TELEPHONE ENCOUNTER
Chart audit shows patient had a positive (abnormal) Cologuard test completed 07/18/2022. Audit shows results were entered correctly, ordering physician/APC reviewed and follow up plan in place.

## 2022-12-29 ENCOUNTER — TELEPHONE (OUTPATIENT)
Dept: GASTROENTEROLOGY | Facility: CLINIC | Age: 72
End: 2022-12-29

## 2022-12-29 NOTE — TELEPHONE ENCOUNTER
PT IS DUE FOR A 6 MONTHS COLONOSCOPY REPEAT IN MARCH 2023. HE HAD A COLONOSCOPY IN SEPT 2022.   DOES HE NEED AN OV BEFORE YOU CAN SCHEDULE HIS SCOPE?

## 2023-01-05 ENCOUNTER — APPOINTMENT (OUTPATIENT)
Dept: CT IMAGING | Age: 73
End: 2023-01-05
Payer: MEDICARE

## 2023-01-05 ENCOUNTER — HOSPITAL ENCOUNTER (EMERGENCY)
Age: 73
Discharge: HOME | End: 2023-01-05
Attending: EMERGENCY MEDICINE
Payer: MEDICARE

## 2023-01-05 VITALS
WEIGHT: 240 LBS | DIASTOLIC BLOOD PRESSURE: 86 MMHG | TEMPERATURE: 97 F | OXYGEN SATURATION: 97 % | HEIGHT: 72 IN | HEART RATE: 69 BPM | SYSTOLIC BLOOD PRESSURE: 142 MMHG | RESPIRATION RATE: 20 BRPM | BODY MASS INDEX: 32.51 KG/M2

## 2023-01-05 DIAGNOSIS — R10.9 RIGHT FLANK PAIN: Primary | ICD-10-CM

## 2023-01-05 DIAGNOSIS — N20.1 URETEROLITHIASIS: ICD-10-CM

## 2023-01-05 LAB
ALBUMIN SERPL-MCNC: 4.3 G/DL (ref 3.5–5.2)
ALP BLD-CCNC: 75 U/L (ref 40–130)
ALT SERPL-CCNC: 41 U/L (ref 5–41)
ANION GAP SERPL CALCULATED.3IONS-SCNC: 14 MMOL/L (ref 7–19)
AST SERPL-CCNC: 27 U/L (ref 5–40)
BACTERIA: NEGATIVE /HPF
BASOPHILS ABSOLUTE: 0.1 K/UL (ref 0–0.2)
BASOPHILS RELATIVE PERCENT: 1.2 % (ref 0–1)
BILIRUB SERPL-MCNC: 0.7 MG/DL (ref 0.2–1.2)
BILIRUBIN URINE: NEGATIVE
BLOOD, URINE: ABNORMAL
BUN BLDV-MCNC: 17 MG/DL (ref 8–23)
CALCIUM SERPL-MCNC: 10.5 MG/DL (ref 8.8–10.2)
CHLORIDE BLD-SCNC: 104 MMOL/L (ref 98–111)
CLARITY: ABNORMAL
CO2: 23 MMOL/L (ref 22–29)
COLOR: YELLOW
CREAT SERPL-MCNC: 0.9 MG/DL (ref 0.5–1.2)
CRYSTALS, UA: ABNORMAL /HPF
EOSINOPHILS ABSOLUTE: 0.1 K/UL (ref 0–0.6)
EOSINOPHILS RELATIVE PERCENT: 1.9 % (ref 0–5)
EPITHELIAL CELLS, UA: 2 /HPF (ref 0–5)
GFR SERPL CREATININE-BSD FRML MDRD: >60 ML/MIN/{1.73_M2}
GLUCOSE BLD-MCNC: 153 MG/DL (ref 74–109)
GLUCOSE URINE: NEGATIVE MG/DL
HCT VFR BLD CALC: 50.4 % (ref 42–52)
HEMOGLOBIN: 16.9 G/DL (ref 14–18)
HYALINE CASTS: 2 /HPF (ref 0–8)
IMMATURE GRANULOCYTES #: 0 K/UL
KETONES, URINE: ABNORMAL MG/DL
LACTIC ACID: 2.2 MMOL/L (ref 0.5–1.9)
LEUKOCYTE ESTERASE, URINE: ABNORMAL
LIPASE: 24 U/L (ref 13–60)
LYMPHOCYTES ABSOLUTE: 1.3 K/UL (ref 1.1–4.5)
LYMPHOCYTES RELATIVE PERCENT: 19.1 % (ref 20–40)
MCH RBC QN AUTO: 30 PG (ref 27–31)
MCHC RBC AUTO-ENTMCNC: 33.5 G/DL (ref 33–37)
MCV RBC AUTO: 89.4 FL (ref 80–94)
MONOCYTES ABSOLUTE: 0.5 K/UL (ref 0–0.9)
MONOCYTES RELATIVE PERCENT: 7.6 % (ref 0–10)
NEUTROPHILS ABSOLUTE: 4.8 K/UL (ref 1.5–7.5)
NEUTROPHILS RELATIVE PERCENT: 69.8 % (ref 50–65)
NITRITE, URINE: NEGATIVE
PDW BLD-RTO: 13.1 % (ref 11.5–14.5)
PH UA: 6 (ref 5–8)
PLATELET # BLD: 165 K/UL (ref 130–400)
PMV BLD AUTO: 11.7 FL (ref 9.4–12.4)
POTASSIUM SERPL-SCNC: 4.4 MMOL/L (ref 3.5–5)
PROTEIN UA: 100 MG/DL
RBC # BLD: 5.64 M/UL (ref 4.7–6.1)
RBC UA: >900 /HPF (ref 0–4)
SODIUM BLD-SCNC: 141 MMOL/L (ref 136–145)
SPECIFIC GRAVITY UA: 1.02 (ref 1–1.03)
TOTAL PROTEIN: 7.7 G/DL (ref 6.6–8.7)
UROBILINOGEN, URINE: 1 E.U./DL
WBC # BLD: 6.8 K/UL (ref 4.8–10.8)
WBC UA: 4 /HPF (ref 0–5)

## 2023-01-05 PROCEDURE — 74150 CT ABDOMEN W/O CONTRAST: CPT

## 2023-01-05 PROCEDURE — 74176 CT ABD & PELVIS W/O CONTRAST: CPT | Performed by: RADIOLOGY

## 2023-01-05 PROCEDURE — 36415 COLL VENOUS BLD VENIPUNCTURE: CPT

## 2023-01-05 PROCEDURE — 83690 ASSAY OF LIPASE: CPT

## 2023-01-05 PROCEDURE — 80053 COMPREHEN METABOLIC PANEL: CPT

## 2023-01-05 PROCEDURE — 85025 COMPLETE CBC W/AUTO DIFF WBC: CPT

## 2023-01-05 PROCEDURE — 99284 EMERGENCY DEPT VISIT MOD MDM: CPT

## 2023-01-05 PROCEDURE — 83605 ASSAY OF LACTIC ACID: CPT

## 2023-01-05 PROCEDURE — 81001 URINALYSIS AUTO W/SCOPE: CPT

## 2023-01-05 RX ORDER — HYDROCODONE BITARTRATE AND ACETAMINOPHEN 7.5; 325 MG/1; MG/1
1 TABLET ORAL EVERY 6 HOURS PRN
Qty: 12 TABLET | Refills: 0 | Status: SHIPPED | OUTPATIENT
Start: 2023-01-05 | End: 2023-01-08

## 2023-01-05 RX ORDER — TAMSULOSIN HYDROCHLORIDE 0.4 MG/1
0.4 CAPSULE ORAL DAILY
Qty: 10 CAPSULE | Refills: 0 | Status: SHIPPED | OUTPATIENT
Start: 2023-01-05

## 2023-01-05 RX ORDER — ONDANSETRON 4 MG/1
4 TABLET, ORALLY DISINTEGRATING ORAL 3 TIMES DAILY PRN
Qty: 21 TABLET | Refills: 0 | Status: SHIPPED | OUTPATIENT
Start: 2023-01-05

## 2023-01-05 ASSESSMENT — PAIN - FUNCTIONAL ASSESSMENT: PAIN_FUNCTIONAL_ASSESSMENT: 0-10

## 2023-01-05 ASSESSMENT — ENCOUNTER SYMPTOMS
NAUSEA: 0
SHORTNESS OF BREATH: 0
DIARRHEA: 0
EYE DISCHARGE: 0
FACIAL SWELLING: 0
COUGH: 0
CONSTIPATION: 0
SORE THROAT: 0
CHOKING: 0
BLOOD IN STOOL: 0
SINUS PRESSURE: 0
ABDOMINAL PAIN: 0
VOICE CHANGE: 0
APNEA: 0

## 2023-01-05 ASSESSMENT — PAIN DESCRIPTION - DESCRIPTORS: DESCRIPTORS: SHARP

## 2023-01-05 ASSESSMENT — PAIN SCALES - GENERAL: PAINLEVEL_OUTOF10: 9

## 2023-01-05 ASSESSMENT — PAIN DESCRIPTION - LOCATION: LOCATION: FLANK

## 2023-01-05 ASSESSMENT — PAIN DESCRIPTION - ORIENTATION: ORIENTATION: RIGHT

## 2023-01-05 NOTE — ED PROVIDER NOTES
[facility] EMERGENCY DEPT  eMERGENCY dEPARTMENT eNCOUnter      Pt Name: Funmilayo Humphries  MRN: [id number]  [place] 1950  Date of evaluation: 1/5/2023  Provider: Colby Strong MD    [address] [place] [address]       Chief Complaint   Patient presents with    Flank Pain     Pt arrived to the ed with c/o right flank pain. Onset today. Pt has a hx of kidney stones and believes this is what that is. HISTORY OF PRESENT ILLNESS   (Location/Symptom, Timing/Onset,Context/Setting, Quality, Duration, Modifying Factors, Severity)  Note limiting factors. Funmilayo Humphries is a 67 y.o. male who presents to the emergency department valuation of right flank pain. 77-year-old male presents with cute onset of right flank pain this morning. History of kidney stones. He has passed 1 at least before. Without intervention. Has not seen urology in over a year. Denies any fever chills or infectious symptoms. No difficulty with urination. Pain comes and goes seems comfortable right now. Some nausea no vomiting. No bowel changes. He thought he saw some blood in his urine a couple days ago but did not have any pain with that. He was not sure. The history is provided by the patient and the spouse. NursingNotes were reviewed. REVIEW OF SYSTEMS    (2-9 systems for level 4, 10 or more for level 5)     Review of Systems   Constitutional:  Negative for chills and fever. HENT:  Negative for congestion, drooling, facial swelling, nosebleeds, sinus pressure, sore throat and voice change. Eyes:  Negative for discharge. Respiratory:  Negative for apnea, cough, choking and shortness of breath. Cardiovascular:  Negative for chest pain and leg swelling. Gastrointestinal:  Negative for abdominal pain, blood in stool, constipation, diarrhea and nausea. Genitourinary:  Positive for flank pain and hematuria. Negative for difficulty urinating, dysuria and enuresis. Musculoskeletal:  Negative for joint swelling.    Skin:  Negative for rash and wound. Neurological:  Negative for seizures and syncope. Psychiatric/Behavioral:  Negative for behavioral problems, hallucinations and suicidal ideas. All other systems reviewed and are negative. A complete review of systems was performed and is negative except as noted above in the HPI. PAST MEDICAL HISTORY     Past Medical History:   Diagnosis Date    CAD (coronary artery disease)     recent stent per violeta    Cervical myelopathy ([facility] [address] 4/11/2019    Degeneration of intervertebral disc at C4-C5 level 4/11/2019    Diabetes mellitus ([facility] [address]     Hyperlipidemia     Hypertension     Knee pain          SURGICAL HISTORY       Past Surgical History:   Procedure Laterality Date    BACK SURGERY      CARDIAC SURGERY  01/01/1999    CABG X 4VLIMA to LAD, SVG to OM, diag 1 and RCA    CERVICAL FUSION N/A 4/11/2019    C3-4 C4-5 ACDF performed by Aleisha Sanchez MD at [address] [facility] [employer] CATH LAB PROCEDURE      KNEE SURGERY Right     scope, age 35ish     TOTAL KNEE ARTHROPLASTY Right 7/20/2020    RIGHT TOTAL KNEE [facility] [address] [facility]       Previous Medications    ASPIRIN 81 MG EC TABLET    Take 1 tablet by mouth 2 times daily    GABAPENTIN (NEURONTIN) 300 MG CAPSULE    Take 1 capsule by mouth 4 times daily for 30 days. LOSARTAN-HYDROCHLOROTHIAZIDE (HYZAAR) 100-25 MG PER TABLET    Take 1 tablet by mouth daily    METFORMIN (GLUCOPHAGE) 500 MG TABLET    HOLD METFORMIN FOR 48 HOURS AFTER THE CARDIAC CATHETERIZATION    NITROGLYCERIN (NITROSTAT) 0.4 MG SL TABLET    Place 1 tablet under the tongue every 5 minutes as needed for Chest pain    PRASUGREL (EFFIENT) 10 MG TABS    Take 1 tablet by mouth daily    SIMVASTATIN (ZOCOR) 40 MG TABLET    Take 40 mg by mouth nightly    TRAMADOL (ULTRAM) 50 MG TABLET    Take 50 mg by mouth every 6 hours as needed for Pain. Patient does not know frequency ordered.        ALLERGIES Patient has no known allergies. FAMILY HISTORY       Family History   Problem Relation Age of Onset    Heart Disease Mother     Diabetes Mother     Cancer Father         LUNG CA          SOCIAL HISTORY       Social History     Socioeconomic History    Marital status:      Spouse name: Ivone Acevedo    Number of children: None    Years of education: None    Highest education level: None   Tobacco Use    Smoking status: Former     Types: Cigarettes     Quit date: 1995     Years since quittin.7    Smokeless tobacco: Never   Vaping Use    Vaping Use: Never used   Substance and Sexual Activity    Alcohol use: No    Drug use: No       SCREENINGS    Ginny Coma Scale  Eye Opening: Spontaneous  Best Verbal Response: Oriented  Best Motor Response: Obeys commands  Ginny Coma Scale Score: 15        PHYSICAL EXAM    (up to 7 for level 4, 8 or more for level 5)     ED Triage Vitals [23 1220]   BP Temp Temp Source Heart Rate Resp SpO2 Height Weight   (!) 142/86 97 °F (36.1 °C) Temporal 69 20 97 % 6' (1.829 m) 240 lb (108.9 kg)       Physical Exam  Vitals and nursing note reviewed. Constitutional:       General: He is not in acute distress. Appearance: He is well-developed. HENT:      Head: Normocephalic and atraumatic. Right Ear: External ear normal.      Left Ear: External ear normal.   Eyes:      General: No scleral icterus. Conjunctiva/sclera: Conjunctivae normal.      Pupils: Pupils are equal, round, and reactive to light. Cardiovascular:      Rate and Rhythm: Normal rate and regular rhythm. Pulses: Normal pulses. Heart sounds: No murmur heard. Pulmonary:      Effort: Pulmonary effort is normal.   Abdominal:      General: Bowel sounds are normal.      Tenderness: There is right CVA tenderness. Musculoskeletal:         General: Normal range of motion. Cervical back: Normal range of motion and neck supple. Skin:     General: Skin is warm and dry.       Coloration: Skin is not jaundiced or pale. Findings: No rash. Neurological:      General: No focal deficit present. Mental Status: He is alert and oriented to person, place, and time. Psychiatric:         Mood and Affect: Mood normal.         Behavior: Behavior normal.       DIAGNOSTIC RESULTS     EKG: All EKG's are interpreted by the Emergency Department Physician who either signs or Co-signs this chart in the absence of a cardiologist.        RADIOLOGY:   Non-plain film images such as CT, Ultrasound and MRI are read by the radiologist. Plainradiographic images are visualized and preliminarily interpreted by the emergency physician with the below findings:    I have reviewed the images and results. Interpretation per the Radiologist below, if available at the time of this note:    CT KIDNEY WO CONTRAST   Final Result   Right hydroureteronephrosis with a 2 mm distal ureteric stone. Bilateral nephrolithiasis. Atherosclerosis. Postsurgical changes of the lumbar spine. Cholecystectomy. All CT scans at this facility utilize dose modulation, iterative reconstruction, and/or weight based dosing when appropriate to reduce radiation dose to as low as reasonably achievable. Dictated and Electronically Signed by Edda Wilkerson MD at 05-Jan-2023 04:12:01 PM                     ED BEDSIDE ULTRASOUND:   Performed by ED Physician - none    LABS:  Labs Reviewed   CBC WITH AUTO DIFFERENTIAL - Abnormal; Notable for the following components:       Result Value    Neutrophils % 69.8 (*)     Lymphocytes % 19.1 (*)     Basophils % 1.2 (*)     All other components within normal limits   COMPREHENSIVE METABOLIC PANEL - Abnormal; Notable for the following components:    Glucose 153 (*)     Calcium 10.5 (*)     All other components within normal limits   LACTIC ACID - Abnormal; Notable for the following components:    Lactic Acid 2.2 (*)     All other components within normal limits    Narrative:     CALL  Bello  CLARA tel. ,  Chemistry results called to and read back by MAJO STEPHENSON [address], 01/05/2023 13:27,  by BON   URINALYSIS - Abnormal; Notable for the following components:    Clarity, UA CLOUDY (*)     Ketones, Urine TRACE (*)     Blood, Urine LARGE (*)     Protein,  (*)     Leukocyte Esterase, Urine TRACE (*)     All other components within normal limits   MICROSCOPIC URINALYSIS - Abnormal; Notable for the following components:    Bacteria, UA NEGATIVE (*)     Crystals, UA NEG (*)     RBC, UA >900 (*)     All other components within normal limits   LIPASE       All other labs were within normal range or not returned as of this dictation. EMERGENCY DEPARTMENT COURSE and DIFFERENTIALDIAGNOSIS/MDM:   Vitals:    Vitals:    01/05/23 1220   BP: (!) 142/86   Pulse: 69   Resp: 20   Temp: 97 °F (36.1 °C)   TempSrc: Temporal   SpO2: 97%   Weight: 240 lb (108.9 kg)   Height: 6' (1.829 m)       MDM  Number of Diagnoses or Management Options  Right flank pain  Ureterolithiasis  Diagnosis management comments: Distal stone seen on CT. Patient does not want anything for pain or nausea at this moment seems to be tolerating it well. But we will fix him up with some prescriptions at home. He has seen Dr. Jennyfer Sanchez group and          CONSULTS:  None    PROCEDURES:  Unless otherwise notedbelow, none     Procedures    FINAL IMPRESSION     1. Right flank pain    2. Ureterolithiasis          DISPOSITION/PLAN   DISPOSITION Decision To Discharge 01/05/2023 03:31:19 PM      PATIENT REFERRED TO:  @FUP@    DISCHARGE MEDICATIONS:  New Prescriptions    HYDROCODONE-ACETAMINOPHEN (NORCO) 7.5-325 MG PER TABLET    Take 1 tablet by mouth every 6 hours as needed for Pain for up to 3 days.  . Take lowest dose possible to manage pain Max Daily Amount: 4 tablets    ONDANSETRON (ZOFRAN-ODT) 4 MG DISINTEGRATING TABLET    Take 1 tablet by mouth 3 times daily as needed for Nausea or Vomiting    TAMSULOSIN (FLOMAX) 0.4 MG CAPSULE    Take 1 capsule by mouth daily (Please note that portions of this note were completed with a voice recognition program.  Efforts were made to edit the dictations butoccasionally words are mis-transcribed.)    Rodney Munson MD (electronically signed)  AttendingEmergency Physician          Landon Hernandez MD  01/05/23 [id number]

## 2023-01-05 NOTE — DISCHARGE INSTRUCTIONS
Strain your urine see if you can catch the stone when you pass it.   If symptoms persist you do not pass the stone you will need to follow-up with your urologist.

## 2023-01-17 ENCOUNTER — PREP FOR SURGERY (OUTPATIENT)
Dept: OTHER | Facility: HOSPITAL | Age: 73
End: 2023-01-17
Payer: MEDICARE

## 2023-01-17 DIAGNOSIS — Z80.0 ENCOUNTER FOR COLONOSCOPY IN PATIENT WITH FAMILY HISTORY OF COLON CANCER: Primary | ICD-10-CM

## 2023-01-17 DIAGNOSIS — Z86.010 HISTORY OF COLON POLYPS: ICD-10-CM

## 2023-01-17 DIAGNOSIS — Z12.11 ENCOUNTER FOR COLONOSCOPY IN PATIENT WITH FAMILY HISTORY OF COLON CANCER: Primary | ICD-10-CM

## 2023-01-18 RX ORDER — SODIUM PICOSULFATE, MAGNESIUM OXIDE, AND ANHYDROUS CITRIC ACID 10; 3.5; 12 MG/160ML; G/160ML; G/160ML
160 LIQUID ORAL ONCE
Qty: 320 ML | Refills: 0 | Status: SHIPPED | OUTPATIENT
Start: 2023-01-18 | End: 2023-01-18

## 2023-01-19 ENCOUNTER — TELEPHONE (OUTPATIENT)
Dept: GASTROENTEROLOGY | Facility: CLINIC | Age: 73
End: 2023-01-19
Payer: MEDICARE

## 2023-01-19 NOTE — TELEPHONE ENCOUNTER
Called and spoke with Navin (Dr Badillo office)      Told her that PT had canceled his recall colonoscopy.

## 2023-07-19 ENCOUNTER — APPOINTMENT (OUTPATIENT)
Dept: GENERAL RADIOLOGY | Facility: HOSPITAL | Age: 73
End: 2023-07-19
Payer: MEDICARE

## 2023-07-19 ENCOUNTER — HOSPITAL ENCOUNTER (EMERGENCY)
Facility: HOSPITAL | Age: 73
Discharge: HOME | End: 2023-07-19
Attending: STUDENT IN AN ORGANIZED HEALTH CARE EDUCATION/TRAINING PROGRAM | Admitting: STUDENT IN AN ORGANIZED HEALTH CARE EDUCATION/TRAINING PROGRAM
Payer: MEDICARE

## 2023-07-19 VITALS
RESPIRATION RATE: 16 BRPM | SYSTOLIC BLOOD PRESSURE: 128 MMHG | HEART RATE: 68 BPM | OXYGEN SATURATION: 95 % | TEMPERATURE: 97.1 F | WEIGHT: 238 LBS | DIASTOLIC BLOOD PRESSURE: 78 MMHG | BODY MASS INDEX: 32.23 KG/M2 | HEIGHT: 72 IN

## 2023-07-19 DIAGNOSIS — R07.9 CHEST PAIN, UNSPECIFIED TYPE: Primary | ICD-10-CM

## 2023-07-19 LAB
ANION GAP SERPL CALCULATED.3IONS-SCNC: 13 MMOL/L (ref 5–15)
BASOPHILS # BLD AUTO: 0.07 10*3/MM3 (ref 0–0.2)
BASOPHILS NFR BLD AUTO: 1.5 % (ref 0–1.5)
BUN SERPL-MCNC: 14 MG/DL (ref 8–23)
BUN/CREAT SERPL: 17.1 (ref 7–25)
CALCIUM SPEC-SCNC: 9.7 MG/DL (ref 8.6–10.5)
CHLORIDE SERPL-SCNC: 107 MMOL/L (ref 98–107)
CO2 SERPL-SCNC: 21 MMOL/L (ref 22–29)
CREAT SERPL-MCNC: 0.82 MG/DL (ref 0.76–1.27)
DEPRECATED RDW RBC AUTO: 42 FL (ref 37–54)
EGFRCR SERPLBLD CKD-EPI 2021: 93.3 ML/MIN/1.73
EOSINOPHIL # BLD AUTO: 0.24 10*3/MM3 (ref 0–0.4)
EOSINOPHIL NFR BLD AUTO: 5.2 % (ref 0.3–6.2)
ERYTHROCYTE [DISTWIDTH] IN BLOOD BY AUTOMATED COUNT: 13.2 % (ref 12.3–15.4)
GEN 5 2HR TROPONIN T REFLEX: 9 NG/L
GLUCOSE SERPL-MCNC: 115 MG/DL (ref 65–99)
HCT VFR BLD AUTO: 44.9 % (ref 37.5–51)
HGB BLD-MCNC: 14.9 G/DL (ref 13–17.7)
IMM GRANULOCYTES # BLD AUTO: 0.01 10*3/MM3 (ref 0–0.05)
IMM GRANULOCYTES NFR BLD AUTO: 0.2 % (ref 0–0.5)
LYMPHOCYTES # BLD AUTO: 1.42 10*3/MM3 (ref 0.7–3.1)
LYMPHOCYTES NFR BLD AUTO: 30.5 % (ref 19.6–45.3)
MAGNESIUM SERPL-MCNC: 2 MG/DL (ref 1.6–2.4)
MCH RBC QN AUTO: 28.9 PG (ref 26.6–33)
MCHC RBC AUTO-ENTMCNC: 33.2 G/DL (ref 31.5–35.7)
MCV RBC AUTO: 87 FL (ref 79–97)
MONOCYTES # BLD AUTO: 0.65 10*3/MM3 (ref 0.1–0.9)
MONOCYTES NFR BLD AUTO: 14 % (ref 5–12)
NEUTROPHILS NFR BLD AUTO: 2.26 10*3/MM3 (ref 1.7–7)
NEUTROPHILS NFR BLD AUTO: 48.6 % (ref 42.7–76)
NRBC BLD AUTO-RTO: 0 /100 WBC (ref 0–0.2)
PLATELET # BLD AUTO: 167 10*3/MM3 (ref 140–450)
PMV BLD AUTO: 11.7 FL (ref 6–12)
POTASSIUM SERPL-SCNC: 4.4 MMOL/L (ref 3.5–5.2)
RBC # BLD AUTO: 5.16 10*6/MM3 (ref 4.14–5.8)
SODIUM SERPL-SCNC: 141 MMOL/L (ref 136–145)
TROPONIN T DELTA: -3 NG/L
TROPONIN T SERPL HS-MCNC: 12 NG/L
WBC NRBC COR # BLD: 4.65 10*3/MM3 (ref 3.4–10.8)

## 2023-07-19 PROCEDURE — 83735 ASSAY OF MAGNESIUM: CPT | Performed by: STUDENT IN AN ORGANIZED HEALTH CARE EDUCATION/TRAINING PROGRAM

## 2023-07-19 PROCEDURE — 93005 ELECTROCARDIOGRAM TRACING: CPT

## 2023-07-19 PROCEDURE — 93005 ELECTROCARDIOGRAM TRACING: CPT | Performed by: STUDENT IN AN ORGANIZED HEALTH CARE EDUCATION/TRAINING PROGRAM

## 2023-07-19 PROCEDURE — 85025 COMPLETE CBC W/AUTO DIFF WBC: CPT | Performed by: STUDENT IN AN ORGANIZED HEALTH CARE EDUCATION/TRAINING PROGRAM

## 2023-07-19 PROCEDURE — 80048 BASIC METABOLIC PNL TOTAL CA: CPT | Performed by: STUDENT IN AN ORGANIZED HEALTH CARE EDUCATION/TRAINING PROGRAM

## 2023-07-19 PROCEDURE — 99284 EMERGENCY DEPT VISIT MOD MDM: CPT

## 2023-07-19 PROCEDURE — 71045 X-RAY EXAM CHEST 1 VIEW: CPT

## 2023-07-19 PROCEDURE — 36415 COLL VENOUS BLD VENIPUNCTURE: CPT

## 2023-07-19 PROCEDURE — 84484 ASSAY OF TROPONIN QUANT: CPT | Performed by: STUDENT IN AN ORGANIZED HEALTH CARE EDUCATION/TRAINING PROGRAM

## 2023-07-19 PROCEDURE — 93010 ELECTROCARDIOGRAM REPORT: CPT | Performed by: INTERNAL MEDICINE

## 2023-07-19 RX ORDER — ASPIRIN 81 MG/1
324 TABLET, CHEWABLE ORAL ONCE
Status: DISCONTINUED | OUTPATIENT
Start: 2023-07-19 | End: 2023-07-19

## 2023-07-19 RX ORDER — NITROGLYCERIN 0.4 MG/1
0.4 TABLET SUBLINGUAL ONCE
Status: COMPLETED | OUTPATIENT
Start: 2023-07-19 | End: 2023-07-19

## 2023-07-19 RX ADMIN — NITROGLYCERIN 0.4 MG: 0.4 TABLET SUBLINGUAL at 10:45

## 2023-07-19 NOTE — DISCHARGE INSTRUCTIONS
Plan to follow-up for your stress test in the next couple of days; I have requested it to be done Friday.  See Dr. Badillo in the morning as scheduled and mention this to him for follow-up.  Come back for any chest pain or if you feel unwell such as nausea sweatiness lightheadedness or passing out.

## 2023-07-19 NOTE — ED PROVIDER NOTES
Subjective   History of Present Illness  Patient presents due to chest pain.  Started around 2 AM.  He was already awake because of the storms.  It was a gradual onset of pressure on the left side of his chest.  Nonradiating.  At its worst was a 6 or 7 and made him diaphoretic and nauseous and this lasted just a few minutes.  He did have some tingling in his left arm.  Felt lightheaded, did not pass out.  No vomiting.  No shortness of breath at any point.  Pain did not move to his back.  It went away and then about an hour ago he had pressure again, got up to about a 5 or 6, and is now down to about a 2.  No shortness of breath.  No diaphoresis or nausea.  No radiating of the chest pain.  History of nerve problems in his legs but no new focal leg pain or swelling, no history of blood clot.    Review of Systems   Constitutional:  Negative for chills and fever.   Respiratory:  Negative for cough and shortness of breath.    Cardiovascular:  Positive for chest pain. Negative for palpitations.   Gastrointestinal:  Positive for nausea. Negative for abdominal pain and vomiting.   Genitourinary:  Negative for difficulty urinating and dysuria.   Neurological:  Negative for syncope and light-headedness.     Past Medical History:   Diagnosis Date    Arthritis     KNEES    Bell's palsy     Cataract     Coronary artery disease     Diabetes mellitus     Elevated cholesterol     High cholesterol     Hypertension        No Known Allergies    Past Surgical History:   Procedure Laterality Date    BACK SURGERY  1970    TIMES 3 FROM INJURY /LUMBAR     CARPAL TUNNEL RELEASE Bilateral     CHOLECYSTECTOMY      CIRCUMCISION N/A 9/4/2019    Procedure: CIRCUMCISION;  Surgeon: Ian Terrell MD;  Location: [facility] OR;  Service: Urology    COLONOSCOPY N/A 9/20/2022    Procedure: COLONOSCOPY WITH ANESTHESIA;  Surgeon: Yann Burnett MD;  Location: [facility] ENDOSCOPY;  Service: Gastroenterology;  Laterality: N/A;  pre positive  cologuard  post polyps; clip x2  Oskar Badillo MD    CORONARY ARTERY BYPASS GRAFT      KNEE ARTHROSCOPY Right     NECK SURGERY  2019    BY VERN VALENTIN       Family History   Problem Relation Age of Onset    Heart disease Mother     Cancer Father     Colon cancer Son     Colon cancer Maternal Grandfather        Social History     Socioeconomic History    Marital status:    Tobacco Use    Smoking status: Former     Types: Cigarettes     Quit date: 2004     Years since quittin.9    Smokeless tobacco: Never   Vaping Use    Vaping Use: Never used   Substance and Sexual Activity    Alcohol use: No    Drug use: No    Sexual activity: Defer           Objective   Physical Exam  Vitals reviewed.   Constitutional:       General: He is not in acute distress.  HENT:      Head: Normocephalic and atraumatic.   Eyes:      Extraocular Movements: Extraocular movements intact.      Conjunctiva/sclera: Conjunctivae normal.   Cardiovascular:      Pulses: Normal pulses.      Heart sounds: Normal heart sounds.   Pulmonary:      Effort: Pulmonary effort is normal. No respiratory distress.      Breath sounds: No wheezing.   Abdominal:      General: Abdomen is flat. There is no distension.      Tenderness: There is no abdominal tenderness. There is no guarding.   Musculoskeletal:         General: No swelling or tenderness.      Cervical back: Normal range of motion and neck supple.      Right lower leg: No tenderness. No edema.      Left lower leg: No tenderness. No edema.   Skin:     General: Skin is warm and dry.   Neurological:      General: No focal deficit present.      Mental Status: He is alert. Mental status is at baseline.   Psychiatric:         Behavior: Behavior normal.         Thought Content: Thought content normal.       Procedures           ED Course  ED Course as of 23 1438      1124 -Laboratory studies reviewed by me and are notable for no focal abnormality. Initial Tn 12.  [AS]   1306 Patient reports being chest pain-free after nitroglycerin. [AS]   1415 Delta trop <4 [AS]      ED Course User Index  [AS] Nader Brice MD                                           Medical Decision Making  Problems Addressed:  Chest pain, unspecified type: complicated acute illness or injury    Amount and/or Complexity of Data Reviewed  Labs: ordered.  Radiology: ordered.  ECG/medicine tests: ordered.    Risk  OTC drugs.  Prescription drug management.      Christiano SAMANO is a 72 y.o. male with PMH above who presents to the Emergency Department with chest pain. Diagnoses considered include ACS, MSK chest pain, pleurisy, GERD, pneumonia, pericarditis, aortic dissection. Low suspicion for PE at this time given lack of shortness of breath, no history of DVT, no leg pain or leg swelling. Patient hemodynamically stable; tamponade physiology unlikely. Given the differential, initial evaluation will include ECG, CXR, CBC, BMP, Tn x2.     ED Course:   - Patient received ASA, NTG/  - EKG reviewed by me; shows sinus rhythm, no focal ischemic changes, no arrhythmia.  - chest x-ray reviewed by me and shows no focal consolidations, no mediastinal widening, no cardiomegaly, no other acute cardiopulmonary process.  - Lab studies reviewed by me and are significant for no focal abnormality.- On re-evaluation, pt chest pain free. Repeat Troponin negative. At this time, ACS is unlikely given the above ECG interpretation and negative troponin. Aortic dissection unlikely given lack of widened mediastinum on CXR, pain does not radiate to the back, is not described as tearing, and peripheral pulses noted to be equal in bilateral upper extremities. Pericarditis unlikely as the pain is not positional, no diffuse ST changes on ECG. No sign of pneumonia on CXR. Tamponade physiology unlikely given BP stable, no JVD on exam, no electrical alternans on ECG.     Given the negative troponin evaluation with overall negative  workup and no current chest pain, patient was offered admission vs discharge and opts for discharge with close outpatient follow-up.  I strongly recommended the patient stay for stress testing because he has not had recent restratification testing and I discussed the risks of leaving without getting this testing especially given his significant history.  I did discuss with the patient the elevated risk for major adverse cardiac event including MI, chest pain, other event, or death and the patient understands this risk an would still like to follow-up outpatient; he refuses testing or admission and says he sees his doctor in the morning anyway.  Outpt sress test ordered.Patient received strict return precautions including worsening chest pain, shortness of breath, lightheadedness, passing out, or other concerns. All questions answered. Patient/family was understanding and in agreement with today's assessment and plan. The patient was monitored during their stay in the ED and dispositioned without acute event.  Electronically signed by:  Nader Brice MD 7/19/2023 14:38 CDT  Note: Dragon medical dictation software was used in the creation of this note.      Final diagnoses:   Chest pain, unspecified type       ED Disposition  ED Disposition       ED Disposition   Discharge    Condition   Stable    Comment   --               Oskar Badillo MD  [address] [place] [id number]  [phone]               Medication List      No changes were made to your prescriptions during this visit.            Nader Brice MD  07/19/23 [id number]

## 2023-07-21 LAB
QT INTERVAL: 400 MS
QTC INTERVAL: 419 MS

## 2024-11-07 ENCOUNTER — OFFICE VISIT (OUTPATIENT)
Age: 74
End: 2024-11-07

## 2024-11-07 VITALS — WEIGHT: 238 LBS | BODY MASS INDEX: 31.54 KG/M2 | HEIGHT: 73 IN

## 2024-11-07 DIAGNOSIS — M25.462 EFFUSION OF LEFT KNEE JOINT: ICD-10-CM

## 2024-11-07 DIAGNOSIS — M17.12 PRIMARY OSTEOARTHRITIS OF LEFT KNEE: Primary | ICD-10-CM

## 2024-11-07 DIAGNOSIS — G89.29 CHRONIC PAIN OF LEFT KNEE: ICD-10-CM

## 2024-11-07 DIAGNOSIS — M25.562 CHRONIC PAIN OF LEFT KNEE: ICD-10-CM

## 2024-11-07 RX ORDER — LIDOCAINE HYDROCHLORIDE 10 MG/ML
2.5 INJECTION, SOLUTION INFILTRATION; PERINEURAL ONCE
Status: COMPLETED | OUTPATIENT
Start: 2024-11-07 | End: 2024-11-07

## 2024-11-07 RX ORDER — ATORVASTATIN CALCIUM 40 MG/1
1 TABLET, FILM COATED ORAL
COMMUNITY

## 2024-11-07 RX ORDER — LOSARTAN POTASSIUM 50 MG/1
TABLET ORAL
COMMUNITY
Start: 2024-09-02

## 2024-11-07 RX ORDER — CELECOXIB 200 MG/1
CAPSULE ORAL
COMMUNITY
Start: 2024-10-26

## 2024-11-07 RX ORDER — BUPIVACAINE HYDROCHLORIDE 5 MG/ML
30 INJECTION, SOLUTION EPIDURAL; INTRACAUDAL ONCE
Status: COMPLETED | OUTPATIENT
Start: 2024-11-07 | End: 2024-11-07

## 2024-11-07 RX ORDER — BETAMETHASONE SODIUM PHOSPHATE AND BETAMETHASONE ACETATE 3; 3 MG/ML; MG/ML
12 INJECTION, SUSPENSION INTRA-ARTICULAR; INTRALESIONAL; INTRAMUSCULAR; SOFT TISSUE ONCE
Status: COMPLETED | OUTPATIENT
Start: 2024-11-07 | End: 2024-11-07

## 2024-11-07 RX ADMIN — LIDOCAINE HYDROCHLORIDE 2.5 ML: 10 INJECTION, SOLUTION INFILTRATION; PERINEURAL at 09:52

## 2024-11-07 RX ADMIN — BETAMETHASONE SODIUM PHOSPHATE AND BETAMETHASONE ACETATE 12 MG: 3; 3 INJECTION, SUSPENSION INTRA-ARTICULAR; INTRALESIONAL; INTRAMUSCULAR; SOFT TISSUE at 09:51

## 2024-11-07 RX ADMIN — BUPIVACAINE HYDROCHLORIDE 150 MG: 5 INJECTION, SOLUTION EPIDURAL; INTRACAUDAL at 09:52

## 2024-11-07 NOTE — PROGRESS NOTES
Orthopaedic Clinic Note - Established Patient    NAME:  Martin Benjamin   : 1950  MRN: [id number]      2024      CHIEF COMPLAINT:  follow up knee pain, repeat injection      HISTORY OF PRESENT ILLNESS:   Patient is a 74 y.o. male who returns today for follow up of left knee pain and swelling, requesting repeat injection. It has been at least 3 months since last injection was completed. Patient denies any recent trauma, fall, or other other injury. Pain is rated 6/10 today.  They would like to continue with conservative care with repeat injection today.    Past Medical History:        Diagnosis Date    CAD (coronary artery disease)     recent stent per violeta    Cervical myelopathy (HCC) 2019    Degeneration of intervertebral disc at C4-C5 level 2019    Diabetes mellitus (HCC)     Hyperlipidemia     Hypertension     Knee pain        Past Surgical History:        Procedure Laterality Date    BACK SURGERY      CARDIAC SURGERY  1999    CABG X 4VLIMA to LAD, SVG to OM, diag 1 and RCA    CERVICAL FUSION N/A 2019    C3-4 C4-5 ACDF performed by Sekou Wang MD at [facility] OR    CHOLECYSTECTOMY      DIAGNOSTIC CARDIAC CATH LAB PROCEDURE      KNEE SURGERY Right     scope, age 30ish     TOTAL KNEE ARTHROPLASTY Right 2020    RIGHT TOTAL KNEE REPLACEMENT performed by Willis Ann MD at [facility] OR       Current Medications:   Prior to Admission medications    Medication Sig Start Date End Date Taking? Authorizing Provider   celecoxib (CELEBREX) 200 MG capsule  10/26/24  Yes Sariak Carl MD   losartan (COZAAR) 50 MG tablet  24  Yes Sarika Carl MD   atorvastatin (LIPITOR) 40 MG tablet Take 1 tablet by mouth    Sarika Carl MD   tamsulosin (FLOMAX) 0.4 MG capsule Take 1 capsule by mouth daily 23   Dale Willard MD   ondansetron (ZOFRAN-ODT) 4 MG disintegrating tablet Take 1 tablet by mouth 3 times daily as needed for Nausea or Vomiting 23   Dale Willard

## 2025-02-10 ENCOUNTER — OFFICE VISIT (OUTPATIENT)
Age: 75
End: 2025-02-10

## 2025-02-10 VITALS — WEIGHT: 238 LBS | BODY MASS INDEX: 31.54 KG/M2 | HEIGHT: 73 IN

## 2025-02-10 DIAGNOSIS — G89.29 CHRONIC PAIN OF LEFT KNEE: ICD-10-CM

## 2025-02-10 DIAGNOSIS — M25.562 CHRONIC PAIN OF LEFT KNEE: ICD-10-CM

## 2025-02-10 DIAGNOSIS — M17.12 PRIMARY OSTEOARTHRITIS OF LEFT KNEE: Primary | ICD-10-CM

## 2025-02-10 RX ORDER — LIDOCAINE HYDROCHLORIDE 10 MG/ML
2.5 INJECTION, SOLUTION INFILTRATION; PERINEURAL ONCE
Status: COMPLETED | OUTPATIENT
Start: 2025-02-10 | End: 2025-02-10

## 2025-02-10 RX ORDER — BETAMETHASONE SODIUM PHOSPHATE AND BETAMETHASONE ACETATE 3; 3 MG/ML; MG/ML
12 INJECTION, SUSPENSION INTRA-ARTICULAR; INTRALESIONAL; INTRAMUSCULAR; SOFT TISSUE ONCE
Status: COMPLETED | OUTPATIENT
Start: 2025-02-10 | End: 2025-02-10

## 2025-02-10 RX ORDER — BUPIVACAINE HYDROCHLORIDE 5 MG/ML
5 INJECTION, SOLUTION PERINEURAL ONCE
Status: COMPLETED | OUTPATIENT
Start: 2025-02-10 | End: 2025-02-10

## 2025-02-10 RX ORDER — TRIAMCINOLONE ACETONIDE 1 MG/G
CREAM TOPICAL
COMMUNITY
Start: 2024-12-18

## 2025-02-10 RX ADMIN — LIDOCAINE HYDROCHLORIDE 2.5 ML: 10 INJECTION, SOLUTION INFILTRATION; PERINEURAL at 11:18

## 2025-02-10 RX ADMIN — BUPIVACAINE HYDROCHLORIDE 25 MG: 5 INJECTION, SOLUTION PERINEURAL at 11:17

## 2025-02-10 RX ADMIN — BETAMETHASONE SODIUM PHOSPHATE AND BETAMETHASONE ACETATE 12 MG: 3; 3 INJECTION, SUSPENSION INTRA-ARTICULAR; INTRALESIONAL; INTRAMUSCULAR; SOFT TISSUE at 11:17

## 2025-02-10 NOTE — PROGRESS NOTES
Orthopaedic Clinic Note - Established Patient    NAME:  Martin Benjamin   : 1950  MRN: [id number]      2/10/2025      CHIEF COMPLAINT:  follow up knee pain, repeat injection      HISTORY OF PRESENT ILLNESS:   Patient is a 74 y.o. male who returns today for follow up of left knee pain and swelling, requesting repeat injection. It has been at least 3 months since last injection was completed. Patient denies any recent trauma, fall, or other other injury. Pain is rated 8 today.  They would like to continue with conservative care with repeat injection today.    Past Medical History:        Diagnosis Date    CAD (coronary artery disease)     recent stent per violeta    Cervical myelopathy (HCC) 2019    Degeneration of intervertebral disc at C4-C5 level 2019    Diabetes mellitus (HCC)     Hyperlipidemia     Hypertension     Knee pain        Past Surgical History:        Procedure Laterality Date    BACK SURGERY      CARDIAC SURGERY  1999    CABG X 4VLIMA to LAD, SVG to OM, diag 1 and RCA    CERVICAL FUSION N/A 2019    C3-4 C4-5 ACDF performed by Sekou Wang MD at [facility] OR    CHOLECYSTECTOMY      DIAGNOSTIC CARDIAC CATH LAB PROCEDURE      KNEE SURGERY Right     scope, age 30ish     TOTAL KNEE ARTHROPLASTY Right 2020    RIGHT TOTAL KNEE REPLACEMENT performed by Willis Ann MD at [facility] OR       Current Medications:   Prior to Admission medications    Medication Sig Start Date End Date Taking? Authorizing Provider   celecoxib (CELEBREX) 200 MG capsule  10/26/24  Yes Sarika Carl MD   losartan (COZAAR) 50 MG tablet  24  Yes Sarika Carl MD   atorvastatin (LIPITOR) 40 MG tablet Take 1 tablet by mouth   Yes Sarika Carl MD   simvastatin (ZOCOR) 40 MG tablet Take 1 tablet by mouth nightly   Yes ProviderSarika MD   nitroGLYCERIN (NITROSTAT) 0.4 MG SL tablet Place 1 tablet under the tongue every 5 minutes as needed for Chest pain 20  Yes Alex Rivera

## 2025-04-08 ENCOUNTER — TRANSCRIBE ORDERS (OUTPATIENT)
Dept: ADMINISTRATIVE | Facility: HOSPITAL | Age: 75
End: 2025-04-08
Payer: MEDICARE

## 2025-04-08 DIAGNOSIS — I25.720: Primary | ICD-10-CM

## 2025-05-07 ENCOUNTER — HOSPITAL ENCOUNTER (OUTPATIENT)
Dept: CARDIOLOGY | Facility: HOSPITAL | Age: 75
Discharge: HOME | End: 2025-05-07
Payer: MEDICARE

## 2025-05-07 VITALS
SYSTOLIC BLOOD PRESSURE: 137 MMHG | HEART RATE: 64 BPM | HEIGHT: 72 IN | WEIGHT: 240 LBS | BODY MASS INDEX: 32.51 KG/M2 | DIASTOLIC BLOOD PRESSURE: 77 MMHG

## 2025-05-07 DIAGNOSIS — I25.720: ICD-10-CM

## 2025-05-07 LAB
BH CV REST NUCLEAR ISOTOPE DOSE: 11.5 MCI
BH CV STRESS BP STAGE 1: NORMAL
BH CV STRESS COMMENTS STAGE 1: NORMAL
BH CV STRESS DOSE REGADENOSON STAGE 1: 0.4
BH CV STRESS DURATION MIN STAGE 1: 0
BH CV STRESS DURATION SEC STAGE 1: 10
BH CV STRESS HR STAGE 1: 87
BH CV STRESS NUCLEAR ISOTOPE DOSE: 32.5 MCI
BH CV STRESS PROTOCOL 1: NORMAL
BH CV STRESS RECOVERY BP: NORMAL MMHG
BH CV STRESS RECOVERY HR: 75 BPM
BH CV STRESS STAGE 1: 1
MAXIMAL PREDICTED HEART RATE: 146 BPM
PERCENT MAX PREDICTED HR: 59.59 %
SPECT HRT GATED+EF W RNC IV: 51 %
STRESS BASELINE BP: NORMAL MMHG
STRESS BASELINE HR: 64 BPM
STRESS PERCENT HR: 70 %
STRESS POST EXERCISE DUR SEC: 10 SEC
STRESS POST PEAK BP: NORMAL MMHG
STRESS POST PEAK HR: 87 BPM
STRESS TARGET HR: 124 BPM

## 2025-05-07 PROCEDURE — A9502 TC99M TETROFOSMIN: HCPCS | Performed by: FAMILY MEDICINE

## 2025-05-07 PROCEDURE — 34310000005 TECHNETIUM TETROFOSMIN KIT: Performed by: FAMILY MEDICINE

## 2025-05-07 PROCEDURE — 93017 CV STRESS TEST TRACING ONLY: CPT

## 2025-05-07 PROCEDURE — 25010000002 REGADENOSON 0.4 MG/5ML SOLUTION: Performed by: HOSPITALIST

## 2025-05-07 PROCEDURE — 78452 HT MUSCLE IMAGE SPECT MULT: CPT

## 2025-05-07 RX ORDER — REGADENOSON 0.08 MG/ML
0.4 INJECTION, SOLUTION INTRAVENOUS ONCE
Status: COMPLETED | OUTPATIENT
Start: 2025-05-07 | End: 2025-05-07

## 2025-05-07 RX ADMIN — TETROFOSMIN 1 DOSE: 1.38 INJECTION, POWDER, LYOPHILIZED, FOR SOLUTION INTRAVENOUS at 10:31

## 2025-05-07 RX ADMIN — REGADENOSON 0.4 MG: 0.08 INJECTION, SOLUTION INTRAVENOUS at 10:31

## 2025-05-07 RX ADMIN — TETROFOSMIN 1 DOSE: 1.38 INJECTION, POWDER, LYOPHILIZED, FOR SOLUTION INTRAVENOUS at 09:25

## 2025-05-12 ENCOUNTER — OFFICE VISIT (OUTPATIENT)
Age: 75
End: 2025-05-12
Payer: MEDICARE

## 2025-05-12 VITALS — HEIGHT: 73 IN | WEIGHT: 238 LBS | BODY MASS INDEX: 31.54 KG/M2

## 2025-05-12 DIAGNOSIS — G89.29 CHRONIC PAIN OF LEFT KNEE: ICD-10-CM

## 2025-05-12 DIAGNOSIS — M25.462 EFFUSION OF LEFT KNEE JOINT: ICD-10-CM

## 2025-05-12 DIAGNOSIS — M17.12 PRIMARY OSTEOARTHRITIS OF LEFT KNEE: Primary | ICD-10-CM

## 2025-05-12 DIAGNOSIS — M25.562 CHRONIC PAIN OF LEFT KNEE: ICD-10-CM

## 2025-05-12 PROCEDURE — 20610 DRAIN/INJ JOINT/BURSA W/O US: CPT | Performed by: PHYSICIAN ASSISTANT

## 2025-05-12 RX ORDER — BUPIVACAINE HYDROCHLORIDE 5 MG/ML
5 INJECTION, SOLUTION PERINEURAL ONCE
Status: COMPLETED | OUTPATIENT
Start: 2025-05-12 | End: 2025-05-12

## 2025-05-12 RX ORDER — LOSARTAN POTASSIUM 100 MG/1
TABLET ORAL
COMMUNITY
Start: 2025-04-08

## 2025-05-12 RX ORDER — BETAMETHASONE SODIUM PHOSPHATE AND BETAMETHASONE ACETATE 3; 3 MG/ML; MG/ML
12 INJECTION, SUSPENSION INTRA-ARTICULAR; INTRALESIONAL; INTRAMUSCULAR; SOFT TISSUE ONCE
Status: COMPLETED | OUTPATIENT
Start: 2025-05-12 | End: 2025-05-12

## 2025-05-12 RX ORDER — LIDOCAINE HYDROCHLORIDE 10 MG/ML
2.5 INJECTION, SOLUTION INFILTRATION; PERINEURAL ONCE
Status: COMPLETED | OUTPATIENT
Start: 2025-05-12 | End: 2025-05-12

## 2025-05-12 RX ORDER — CLONIDINE HYDROCHLORIDE 0.1 MG/1
TABLET ORAL
COMMUNITY
Start: 2025-04-08

## 2025-05-12 RX ADMIN — BUPIVACAINE HYDROCHLORIDE 25 MG: 5 INJECTION, SOLUTION PERINEURAL at 08:44

## 2025-05-12 RX ADMIN — BETAMETHASONE SODIUM PHOSPHATE AND BETAMETHASONE ACETATE 12 MG: 3; 3 INJECTION, SUSPENSION INTRA-ARTICULAR; INTRALESIONAL; INTRAMUSCULAR; SOFT TISSUE at 08:43

## 2025-05-12 RX ADMIN — LIDOCAINE HYDROCHLORIDE 2.5 ML: 10 INJECTION, SOLUTION INFILTRATION; PERINEURAL at 08:44

## 2025-05-12 NOTE — PROGRESS NOTES
Orthopaedic Clinic Note - Established Patient    NAME:  Martin Benjamin   : 1950  MRN: [id number]      2025      CHIEF COMPLAINT:  follow up knee pain, repeat injection      HISTORY OF PRESENT ILLNESS:   Patient is a 74 y.o. male who returns today for follow up of left knee pain, requesting repeat injection. It has been at least 3 months since last injection was completed. Patient denies any recent trauma, fall, or other other injury. Pain is rated 6 today.  They would like to continue with conservative care with repeat injection today.    Past Medical History:        Diagnosis Date    CAD (coronary artery disease)     recent stent per violeta    Cervical myelopathy (HCC) 2019    Degeneration of intervertebral disc at C4-C5 level 2019    Diabetes mellitus (HCC)     Hyperlipidemia     Hypertension     Knee pain        Past Surgical History:        Procedure Laterality Date    BACK SURGERY      CARDIAC SURGERY  1999    CABG X 4VLIMA to LAD, SVG to OM, diag 1 and RCA    CERVICAL FUSION N/A 2019    C3-4 C4-5 ACDF performed by Sekou Wang MD at [facility] OR    CHOLECYSTECTOMY      DIAGNOSTIC CARDIAC CATH LAB PROCEDURE      KNEE SURGERY Right     scope, age 30ish     TOTAL KNEE ARTHROPLASTY Right 2020    RIGHT TOTAL KNEE REPLACEMENT performed by Willis Ann MD at [facility] OR       Current Medications:   Prior to Admission medications    Medication Sig Start Date End Date Taking? Authorizing Provider   cloNIDine (CATAPRES) 0.1 MG tablet TAKE 1 TABLET BY MOUTH EVERY 4 TO 6 HOURS AS NEEDED IF BP IS >160/90 25  Yes Provider, Historical, MD   losartan (COZAAR) 100 MG tablet  25  Yes Provider, Historical, MD   triamcinolone (KENALOG) 0.1 % cream  24  Yes Provider, MD Sarika   celecoxib (CELEBREX) 200 MG capsule  10/26/24  Yes Provider, Historical, MD   losartan (COZAAR) 50 MG tablet  24  Yes Provider, MD Sarika   atorvastatin (LIPITOR) 40 MG tablet Take 1 tablet by mouth

## 2025-08-13 ENCOUNTER — OFFICE VISIT (OUTPATIENT)
Age: 75
End: 2025-08-13
Payer: MEDICARE

## 2025-08-13 VITALS — BODY MASS INDEX: 31.54 KG/M2 | HEIGHT: 73 IN | WEIGHT: 238 LBS

## 2025-08-13 DIAGNOSIS — M25.562 CHRONIC PAIN OF LEFT KNEE: ICD-10-CM

## 2025-08-13 DIAGNOSIS — M17.12 PRIMARY OSTEOARTHRITIS OF LEFT KNEE: Primary | ICD-10-CM

## 2025-08-13 DIAGNOSIS — G89.29 CHRONIC PAIN OF LEFT KNEE: ICD-10-CM

## 2025-08-13 PROCEDURE — 20610 DRAIN/INJ JOINT/BURSA W/O US: CPT | Performed by: PHYSICIAN ASSISTANT

## 2025-08-13 RX ORDER — BETAMETHASONE SODIUM PHOSPHATE AND BETAMETHASONE ACETATE 3; 3 MG/ML; MG/ML
12 INJECTION, SUSPENSION INTRA-ARTICULAR; INTRALESIONAL; INTRAMUSCULAR; SOFT TISSUE ONCE
Status: COMPLETED | OUTPATIENT
Start: 2025-08-13 | End: 2025-08-13

## 2025-08-13 RX ORDER — LIDOCAINE HYDROCHLORIDE 10 MG/ML
2.5 INJECTION, SOLUTION INFILTRATION; PERINEURAL ONCE
Status: COMPLETED | OUTPATIENT
Start: 2025-08-13 | End: 2025-08-13

## 2025-08-13 RX ORDER — ROPIVACAINE HYDROCHLORIDE 5 MG/ML
5 INJECTION, SOLUTION EPIDURAL; INFILTRATION; PERINEURAL ONCE
Status: COMPLETED | OUTPATIENT
Start: 2025-08-13 | End: 2025-08-13

## 2025-08-13 RX ADMIN — LIDOCAINE HYDROCHLORIDE 2.5 ML: 10 INJECTION, SOLUTION INFILTRATION; PERINEURAL at 11:28

## 2025-08-13 RX ADMIN — BETAMETHASONE SODIUM PHOSPHATE AND BETAMETHASONE ACETATE 12 MG: 3; 3 INJECTION, SUSPENSION INTRA-ARTICULAR; INTRALESIONAL; INTRAMUSCULAR; SOFT TISSUE at 11:24

## 2025-08-13 RX ADMIN — ROPIVACAINE HYDROCHLORIDE 5 ML: 5 INJECTION, SOLUTION EPIDURAL; INFILTRATION; PERINEURAL at 11:29

## (undated) DEVICE — GLOVE SURG SZ 75 L12IN FNGR THK94MIL TRNSLUC YEL LTX

## (undated) DEVICE — DRAPE,UTILITY,XL,4/PK,STERILE: Brand: MEDLINE

## (undated) DEVICE — TBG SMPL FLTR LINE NASL 02/C02 A/ BX/100

## (undated) DEVICE — CURAVIEW LED LARYNSCP BLDE

## (undated) DEVICE — UNDERGLOVE SURG SZ 8 FNGR THK0.21MIL GRN LTX BEAD CUF

## (undated) DEVICE — SUT GUT CHRM 3/0 FS2 27IN 636H

## (undated) DEVICE — BLADE RMR L51MM PAT PILOT H

## (undated) DEVICE — SNAR POLYP SENSATION MICRO OVL 13 240X40

## (undated) DEVICE — SYSTEM SKIN CLSR 22CM DERMBND PRINEO

## (undated) DEVICE — TUBE ET 7.5MM NSL ORAL BASIC CUF INTMED MURPHY EYE RADPQ

## (undated) DEVICE — ZIMMER® STERILE DISPOSABLE TOURNIQUET CUFF WITH PLC, DUAL PORT, SINGLE BLADDER, 34 IN. (86 CM)

## (undated) DEVICE — SOLUTION IV IRRIG POUR BRL 0.9% SODIUM CHL 2F7124

## (undated) DEVICE — DUAL CUT SAGITTAL BLADE

## (undated) DEVICE — GLOVE SURG SZ 85 L12IN FNGR ORTHO 126MIL CRM LTX FREE

## (undated) DEVICE — SUTURE PERMAHAND SZ 2-0 L30IN NONABSORBABLE BLK SILK W/O A305H

## (undated) DEVICE — 4.0MM PRECISION ROUND

## (undated) DEVICE — GLV SURG TRIUMPH ORTHO W/ALOE PF LTX 8 STRL

## (undated) DEVICE — JACKSON-PRATT 100CC BULB RESERVOIR: Brand: CARDINAL HEALTH

## (undated) DEVICE — NEURO CDS

## (undated) DEVICE — DRAIN SURG 10FR L1/8IN DIA3.2MM SIL CHN RND HUBLESS FULL

## (undated) DEVICE — MASK,OXYGEN,MED CONC,ADLT,7' TUB, UC: Brand: PENDING

## (undated) DEVICE — GLOVE SURG SZ 85 CRM LTX FREE POLYISOPRENE POLYMER BEAD ANTI

## (undated) DEVICE — Z DUP USE 2539655 SENSOR EEG AD W1.1XL11IN BRAIN MON 4 ELECTRD BIS

## (undated) DEVICE — EMG TUBE 8229707 NIM TRIVANTAGE 7.0MM ID: Brand: NIM TRIVANTAGE™

## (undated) DEVICE — 3M™ STERI-STRIP™ REINFORCED ADHESIVE SKIN CLOSURES, R1546, 1/4 IN X 4 IN (6 MM X 100 MM), 10 STRIPS/ENVELOPE: Brand: 3M™ STERI-STRIP™

## (undated) DEVICE — TRAP,MUCUS SPECIMEN, 80CC: Brand: MEDLINE

## (undated) DEVICE — GLOVE SURG SZ 75 L12IN FNGR THK79MIL GRN LTX FREE

## (undated) DEVICE — SPONGE SURG WHT KTNR DISECT RADPQ ST

## (undated) DEVICE — Device: Brand: POWER-FLO®

## (undated) DEVICE — GOWN,PRECEPT,XLNG/XXLARGE,STRL: Brand: MEDLINE

## (undated) DEVICE — SURGICAL PROCEDURE PACK KNEE TOT DBD CDS LOURDES HOSP LF

## (undated) DEVICE — SHEET,DRAPE,53X77,STERILE: Brand: MEDLINE

## (undated) DEVICE — SENSR O2 OXIMAX FNGR A/ 18IN NONSTR

## (undated) DEVICE — STERILE POLYISOPRENE POWDER-FREE SURGICAL GLOVES: Brand: PROTEXIS

## (undated) DEVICE — FORCEP BPLR IRIS

## (undated) DEVICE — DRESSING FOAM W4XL4IN SIL FACE BORD ADH PD SUP ABSRB COR

## (undated) DEVICE — PAD MINOR UNIVERSAL: Brand: MEDLINE INDUSTRIES, INC.

## (undated) DEVICE — PK TURNOVER RM ADV

## (undated) DEVICE — ARM BOARD PAD: Brand: DEVON

## (undated) DEVICE — YANKAUER,BULB TIP WITH VENT: Brand: ARGYLE

## (undated) DEVICE — KIT EVAC 0.13IN RECT TB DIA10FR 400CC PVC 3 SPR Y CONN DRN

## (undated) DEVICE — DRSNG WND SIL OPTIFOAM GENTLE BRDR ADHS W/SA 4X4IN

## (undated) DEVICE — SUT GUT CHRM 2/0 STD TIES 54IN S113H

## (undated) DEVICE — SUTURE VCRL SZ 1 L18IN ABSRB UD L36MM CT-1 1/2 CIR J841D

## (undated) DEVICE — MICRO KOVER: Brand: UNBRANDED

## (undated) DEVICE — 3M™ STERI-DRAPE™ INSTRUMENT POUCH 1018: Brand: STERI-DRAPE™

## (undated) DEVICE — SUTURE VCRL SZ 3-0 L18IN ABSRB UD L26MM SH 1/2 CIR J864D

## (undated) DEVICE — E-Z CLEAN, NON-STICK, PTFE COATED, ELECTROSURGICAL BLADE ELECTRODE, MODIFIED EXTENDED INSULATION, 2.5 INCH (6.35 CM): Brand: MEGADYNE

## (undated) DEVICE — SUTURE MCRYL SZ 4-0 L18IN ABSRB UD L19MM PS-2 3/8 CIR PRIM Y496G

## (undated) DEVICE — SUTURE VCRL SZ 2-0 L36IN ABSRB UD L36MM CT-1 1/2 CIR J945H

## (undated) DEVICE — ADHS LIQ MASTISOL 2/3ML

## (undated) DEVICE — CUFF,BP,DISP,1 TUBE,ADULT,HP: Brand: MEDLINE

## (undated) DEVICE — THE CHANNEL CLEANING BRUSH IS A NYLON FLEXI BRUSH ATTACHED TO A FLEXIBLE PLASTIC SHEATH DESIGNED TO SAFELY REMOVE DEBRIS FROM FLEXIBLE ENDOSCOPES.

## (undated) DEVICE — GLOVE SURG SZ 65 L12IN FNGR THK126MIL CRM LTX FREE

## (undated) DEVICE — Z INACTIVE USE 2660664 SOLUTION IRRIG 3000ML 0.9% SOD CHL USP UROMATIC PLAS CONT

## (undated) DEVICE — BLADE SAW W12.5XL70MM THK1MM RECIP DBL SIDE OFFSET

## (undated) DEVICE — YANKAUER SUCTION INSTRUMENT WITHOUT CONTROL VENT, OPEN TIP, CLEAR: Brand: YANKAUER

## (undated) DEVICE — REPLACEMENT PD F VISTA CERV CLLR ADLT

## (undated) DEVICE — GLOVE SURG SZ 85 L12IN FNGR THK79MIL GRN LTX FREE

## (undated) DEVICE — NON-WOVEN ADHESIVE WOUND DRESSING: Brand: PRIMAPORE ADHESIVE DRESSING 30*10CM

## (undated) DEVICE — SUTURE VCRL SZ 2-0 L27IN ABSRB UD L26MM SH 1/2 CIR J417H

## (undated) DEVICE — Device: Brand: DEFENDO AIR/WATER/SUCTION AND BIOPSY VALVE

## (undated) DEVICE — ESOPHAGEAL STETHOSCOPE W/TEMPERATURE SENSOR: Brand: DEROYAL

## (undated) DEVICE — FAN SPRAY KIT: Brand: PULSAVAC®

## (undated) DEVICE — C-ARMOR C-ARM EQUIPMENT COVERS CLEAR STERILE UNIVERSAL FIT 12 PER CASE: Brand: C-ARMOR

## (undated) DEVICE — CHLORAPREP 26ML ORANGE